# Patient Record
Sex: FEMALE | Race: WHITE | NOT HISPANIC OR LATINO | Employment: FULL TIME | URBAN - METROPOLITAN AREA
[De-identification: names, ages, dates, MRNs, and addresses within clinical notes are randomized per-mention and may not be internally consistent; named-entity substitution may affect disease eponyms.]

---

## 2017-01-04 ENCOUNTER — OFFICE VISIT (OUTPATIENT)
Dept: ORTHOPEDICS | Facility: CLINIC | Age: 57
End: 2017-01-04
Payer: COMMERCIAL

## 2017-01-04 VITALS
WEIGHT: 168 LBS | SYSTOLIC BLOOD PRESSURE: 118 MMHG | BODY MASS INDEX: 25.46 KG/M2 | HEIGHT: 68 IN | DIASTOLIC BLOOD PRESSURE: 78 MMHG

## 2017-01-04 DIAGNOSIS — M54.16 RIGHT LUMBAR RADICULITIS: ICD-10-CM

## 2017-01-04 DIAGNOSIS — M25.551 RIGHT HIP PAIN: Primary | ICD-10-CM

## 2017-01-04 PROCEDURE — 99213 OFFICE O/P EST LOW 20 MIN: CPT | Performed by: FAMILY MEDICINE

## 2017-01-04 RX ORDER — TRIAMCINOLONE ACETONIDE 40 MG/ML
40 INJECTION, SUSPENSION INTRA-ARTICULAR; INTRAMUSCULAR ONCE
Qty: 1 ML | Refills: 0 | Status: CANCELLED | OUTPATIENT
Start: 2017-01-04 | End: 2017-01-04

## 2017-01-04 NOTE — PROGRESS NOTES
Sports Medicine Clinic Visit    PCP: Lala Falcon    Bel Cruz is a 56 year old female who is seen  in consultation at the request of Dr. Falcon presenting with low back pain.  She reports having specific pain over lateral joint line of right hip.  She also ahs noticed pain in lateral hip stabilizers and pain radiating down leg and into toes.  However, it is her lateral joint line pain that is the most problematic.  This pain is exacerbated when she is on her feet when working as a  and when hiking.  This pain is relieved with rest.  She denies injury to her low back or hip.  She recently had X-rays of her pelvis and hip through her chiropractor and was informed that she had mild lateral sided right hip arthritis with mild degeneration of lower lumbar spine.    Injury: None    Location of Pain: Anterior hip pain  Duration of Pain: 6 months  Rating of Pain at worst: 9/10  Rating of Pain Currently:  3/10  Symptoms are better with: OTC pain medication  Symptoms are worse with:Standing, walking, can be unprovoked  Additional Features:   Positive: nerve pain radiating into toes  Other evaluation and/or treatments so far consists of: Chiropractic care, MRI, yoga, traction  Prior History of related problems: Sciatica    Social History: Walks a lot at work    Interim History - January 4, 2017  Since last visit on 12/29/2016 patient has mild right hip pain with minimal radiation.  She presents today for hip injection, although she is having no significant pain.  No interim injury.       Review of Systems  Musculoskeletal: as above  Remainder of review of systems is negative including constitutional, CV, pulmonary, GI, Skin and Neurologic except as noted in HPI or medical history.    Family history, medical history and surgical history have all been discussed with patient and appended to medical chart below.    Past Medical History   Diagnosis Date     Unspecified hypothyroidism      Graves - s/p  "ablation     Esophageal reflux      Panic disorder without agoraphobia 1990     Allergic rhinitis due to other allergen      GENERALIZED ANXIETY DIS 6/27/2007     Past Surgical History   Procedure Laterality Date     Pelvis laparoscopy,dx  1996     Pelvic pain - adhesions and mild endometriosis     Hc hysteroscopy diagostic (separate proc)  8/10/2004     Hysteroscopy, D&C, Endometrial ablation     Colonoscopy  7/26/2011     Procedure:COMBINED COLONOSCOPY, REMOVE TUMOR/POLYP/LESION BY SNARE; single polyp removal; Surgeon:YUE LIMON; Location:[facility]     Us breast clip placement w biopsy left       Colonoscopy with co2 insufflation N/A 6/24/2016     Procedure: COLONOSCOPY WITH CO2 INSUFFLATION;  Surgeon: Joseph Keyes MD;  Location: [facility]     Colonoscopy Left 6/24/2016     Procedure: COMBINED COLONOSCOPY, SINGLE OR MULTIPLE BIOPSY/POLYPECTOMY BY BIOPSY;  Surgeon: Joseph Keyes MD;  Location: [facility]     Family History   Problem Relation Age of Onset     Thyroid Disease Father      Hashimotos     Objective  /78 mmHg  Ht 5' 8\" (1.727 m)  Wt 168 lb (76.204 kg)  BMI 25.55 kg/m2  LMP 07/18/2005  Constitutional:well-developed, well-nourished, and in no distress.   Cardiovascular: Intact distal pulses.    Neurological: alert. Gait normal  Skin: Skin is warm and dry.   Psychiatric: Mood and affect normal.   Respiratory: unlabored, speaks in full sentences  Gastrointestinal: masses neg, deformities neg  Hematologic/Lymphatic/Immunologic: neg lymphadenopathy, neg lymphedema    Exam:  Back Exam:    Inspection:       no visible deformity in the low back       normal skin       normal vascular       normal lymphatic    ROM:       full flexion       full extension       no asymmetric rotation of the pelvis with flexion    Tender/ Tissue Texture Abnormality:  TFL, IT band, gluteal muscles, lateral joint line of hip    Non Tender:       Greater trochanter, anterior or posterior joint line of hip, " paraspinal muscles    Strength:       hip flexion 5/5       knee extension 4/5       ankle dorsiflexion 5/5       ankle plantarflexion 5/5       dorsiflexion of the great toe 5/5      Hip abduction: 4/5      Hip adduction:  4/5    Reflexes:       patellar (L3, L4) symmetric normal       achilles tendons (S1) symmetric normal    Sensation:      grossly intact throughout lower extremities    Special tests:       straight leg raise left neg        straight leg raise right neg       neg (-) GENIA       Negative FABIR       Negative SCOUR       Positive Bobbi         Radiology:  Results for orders placed or performed in visit on 12/27/16   MR Lumbar Spine w/o Contrast    Narrative    MR LUMBAR SPINE W/O CONTRAST 12/27/2016 1:57 PM    History: Radiculopathy, lumbar region    Comparison: None    Technique: Sagittal T1-weighted, sagittal T2-weighted, sagittal STIR,  sagittal diffusion-weighted, axial T2-weighted, and axial gradient  echo images of the lumbar spine were obtained without the  administration of intravenous contrast.    Findings: Regarding numbering convention, there are 5 lumbar-type  vertebrae assumed for the purposes of this dictation.  The tip of the  conus medullaris is at T12. There is subtle grade 1 anterolisthesis of  L4 on L5. Mild disc desiccation at L4-5. Regarding bone marrow signal  intensity, no abnormality is visualized on STIR images.    On a level by level basis:    T12-L1: No spinal canal or neuroforaminal stenosis.    L1-2: No spinal canal or neuroforaminal stenosis.    L2-3: Mild facet arthropathy without significant spinal canal or  neuroforaminal narrowing.    L3-4: Mild facet arthropathy without significant spinal canal or  neural foraminal narrowing.    L4-5: Subtle grade 1 anterolisthesis of L4 on L5. Partially unroofed  intervertebral discs. Moderate bilateral facet arthropathy. Mild  spinal canal narrowing. No significant neural foraminal narrowing.    L5-S1: Small central disc  protrusion. Mild bilateral facet  arthropathy. No significant spinal canal or neural foraminal narrowing    Paraspinous tissues are within normal limits.      Impression    Impression:   1. Mild degenerative changes without significant spinal canal or  neuroforaminal narrowing.  2. Subtle grade 1 anterolisthesis of L4 on L5.    I have personally reviewed the examination and initial interpretation  and I agree with the findings.    ANAND RANDHAWA MD       I have personally reviewed images with patient    Assessment:  1. Right hip pain    2. Right lumbar radiculitis        Plan:  Discussed the assessment with the patient.    Her exam findings continue to be benign in nature.    We discussed postponing her injection today, she agreed  Feeling better clinically  Cannot r/o radicular cause of her pain when it bothers her  Agree she does have a great amount of imbalance of her lateral hip stabilizers, glutes and psoas.    We agree she will begin physical therapy    She is in agreement with this plan.    She is to contact clinic for any questions or concerns.    F/u with Noris in 6 weeks, sooner if needed.  We discussed modified progressive pain-free activity as tolerated  Home handouts provided and supportive care reviewed  All questions were answered today  Contact us with additional questions or concerns  Signs and sx of concern reviewed      Bud Lord DO, DEVONTE  Primary Care Sports Medicine  [facility]         Disclaimer: This note consists of symbols derived from keyboarding, dictation and/or voice recognition software. As a result, there may be errors in the script that have gone undetected. Please consider this when interpreting information found in this chart.

## 2017-01-04 NOTE — NURSING NOTE
"Initial /78 mmHg  Ht 5' 8\" (1.727 m)  Wt 168 lb (76.204 kg)  BMI 25.55 kg/m2  LMP 07/18/2005 Estimated body mass index is 25.55 kg/(m^2) as calculated from the following:    Height as of this encounter: 5' 8\" (1.727 m).    Weight as of this encounter: 168 lb (76.204 kg). .    Frankie Pitts ATC  "

## 2017-01-12 ENCOUNTER — THERAPY VISIT (OUTPATIENT)
Dept: PHYSICAL THERAPY | Facility: CLINIC | Age: 57
End: 2017-01-12
Payer: COMMERCIAL

## 2017-01-12 DIAGNOSIS — M25.551 HIP PAIN, RIGHT: Primary | ICD-10-CM

## 2017-01-12 PROCEDURE — 97110 THERAPEUTIC EXERCISES: CPT | Mod: GP | Performed by: PHYSICAL THERAPIST

## 2017-01-12 PROCEDURE — 97161 PT EVAL LOW COMPLEX 20 MIN: CPT | Mod: GP | Performed by: PHYSICAL THERAPIST

## 2017-01-12 ASSESSMENT — ACTIVITIES OF DAILY LIVING (ADL)
HOS_ADL_ITEM_SCORE_TOTAL: 55
HEAVY_WORK: MODERATE DIFFICULTY
LIGHT_TO_MODERATE_WORK: MODERATE DIFFICULTY
STANDING_FOR_15_MINUTES: NO DIFFICULTY AT ALL
WALKING_DOWN_STEEP_HILLS: NO DIFFICULTY AT ALL
HOS_ADL_COUNT: 17
DEEP_SQUATTING: SLIGHT DIFFICULTY
PUTTING_ON_SOCKS_AND_SHOES: NO DIFFICULTY AT ALL
TWISTING/PIVOTING_ON_INVOLVED_LEG: MODERATE DIFFICULTY
HOS_ADL_SCORE(%): 80.88
SITTING_FOR_15_MINUTES: NO DIFFICULTY AT ALL
WALKING_15_MINUTES_OR_GREATER: SLIGHT DIFFICULTY
WALKING_APPROXIMATELY_10_MINUTES: SLIGHT DIFFICULTY
ROLLING_OVER_IN_BED: SLIGHT DIFFICULTY
WALKING_UP_STEEP_HILLS: NO DIFFICULTY AT ALL
HOS_ADL_HIGHEST_POTENTIAL_SCORE: 68
GETTING_INTO_AND_OUT_OF_A_BATHTUB: NO DIFFICULTY AT ALL
GETTING_INTO_AND_OUT_OF_AN_AVERAGE_CAR: SLIGHT DIFFICULTY
GOING_UP_1_FLIGHT_OF_STAIRS: NO DIFFICULTY AT ALL
RECREATIONAL_ACTIVITIES: SLIGHT DIFFICULTY
STEPPING_UP_AND_DOWN_CURBS: NO DIFFICULTY AT ALL
WALKING_INITIALLY: SLIGHT DIFFICULTY
GOING_DOWN_1_FLIGHT_OF_STAIRS: NO DIFFICULTY AT ALL

## 2017-01-12 NOTE — PROGRESS NOTES
[facility] Initial Evaluation    Subjective:    Bel Cruz is a 56 year old female with a lumbar condition.  Condition occurred with:  Insidious onset.  Condition occurred: for unknown reasons.  This is a recurrent condition  Original pain started in R hip with ER motion in standing.  Started with stepping out of bed in AM.  Over time pain progressed to pain in R thigh and down into R lateral foot.  Hx of sciatic symptoms in R leg ~10 yrs ago.  Pain worse with laying on R side, squatting, prolonged time on feet.  Will wake 2-3x/night.  Trouble getting up from squat position. Feels better with light stretching and massage.  MD order 17 .    Site of Pain: R ant hip.  Radiates to:  Thigh right, foot right and lower leg right.  Pain is described as aching and is intermittent and reported as 7/10.  Associated symptoms:  Loss of motion, loss of motion/stiffness, loss of strength, tingling and numbness. Pain is worse in the P.M..  Symptoms are exacerbated by twisting, lying down, standing, walking, lifting and sitting and relieved by rest.  Since onset symptoms are unchanged.    Previous treatment includes chiropractic (massage).  There was moderate improvement following previous treatment.  General health as reported by patient is excellent.  Pertinent medical history includes:  Thyroid problems and menopausal.  Medical allergies: no.  Other surgeries include:  No.  Current medications:  Thyroid medication and anti-inflammatory.  Current occupation is Teacher, .  Patient is working in normal job without restrictions.  Primary job tasks include:  Prolonged sitting, repetitive tasks, prolonged standing and lifting.    Barriers include:  None as reported by the patient.    Red flags:  None as reported by the patient.                      Objective:    System    Physical Exam    General     ROS  Bel Cruz , : 1960, MRN: [id number]    Physical Therapy Objective  Findings  Subjective information, goals, clinical impression, daily documentation and other information found in EPISODES tab.  Objective:     Lumbar Pain    Posture: sitting: mild slouch, posture correction: no change  Gait:  normal  Lumbar Range of Motion:  Flexion                                                   100%                                                                                                                        Extension 66%   Right Side Bending 75%   Left Side Bending 75%   Repeated extension- standing No change with 30 reps   Repeated flexion- standing No change with 30 reps     Pelvic screen:                                                                         Positive                                            Negative                                             Standing Forward Bend X R    Gillet(March) X R    Supine to sit     Sacroiliac provocation test     Pubic symphysis provocation            -resisted hip add at 44     Other:       Hip Screen:                                                                  Positive                                             Negative                                             Hip ROM     Scour X R    GENIA X R    FADIR  x   Other: repeated hip extension X - improved ROM and muscle strength of hip rotators    Manual Muscle Testing (graded 0-5, measured at 0 degrees unless otherwise noted):                                                                              Right                                  Left                                                     Transversus Abdominus     -Charles Leg Lowering (deg)     Hip Flex L2 4- 5   Hip Abd 3 3   Hip Add 5 5   Hip Ext 3+ 4   Knee Flex 5 5   Knee Ext L3 5 5   Ankle Dorsiflexion L4 5 5   Great Toe Extension L5 5 5   Ankle Plantar Flexion S1 5 5   Other: hip ER  Hip IR 3+  4 5  5   (+ mild pain, ++ moderate pain, +++ severe pain)    Special Tests:                                                                      Positive                                             Negative                                             Sign of Buttock  x   SLR  x   Garo Test X R    Ely Test  x   Prone instability Test  x   Crossover SLR     Repeated extension prone     Other:       Flexibility:                                                              Right                                                 Left                                                      Hamstring SLR 90    Hip flexor Mild tightness normal   Quadricep Mild rightness normal   Bobbi's normal normal   piriformis normal normal   Other:            Segmental Mobility: hypomobile L4-L5    Palpation: hypertonicity R hip flexor/tensor    -If symptoms past hip, must do neuro testing  Dermatome/Sensory  Testing: normal to light touch BLE  Reflex Testing:                                                                 Right                                                  Left                                                     Patellar Tendon normal normal   Achilles Tendon normal normal   Babinski         Assessment/Plan:      Patient is a 56 year old female with lumbar and right side hip complaints.    Patient has the following significant findings with corresponding treatment plan.                Diagnosis 1:  R hip/leg pain consistent with labral tear R hip with compensation of low back    Pain -  hot/cold therapy, manual therapy, self management, education, directional preference exercise and home program  Decreased ROM/flexibility - manual therapy, therapeutic exercise and home program  Decreased joint mobility - manual therapy, therapeutic exercise and home program  Decreased strength - therapeutic exercise, therapeutic activities and home program    Therapy Evaluation Codes:   1) History comprised of:   Personal factors that impact the plan of care:      Age, Past/current experiences and Profession.    Comorbidity factors that impact the  plan of care are:      None.     Medications impacting care: Anti-inflammatory.  2) Examination of Body Systems comprised of:   Body structures and functions that impact the plan of care:      Hip.   Activity limitations that impact the plan of care are:      Lifting, Sports, Squatting/kneeling, Stairs, Standing, Walking and Laying down.  3) Clinical presentation characteristics are:   Stable/Uncomplicated.  4) Decision-Making    Low complexity using standardized patient assessment instrument and/or measureable assessment of functional outcome.  Cumulative Therapy Evaluation is: Low complexity.    Previous and current functional limitations:  (See Goal Flow Sheet for this information)    Short term and Long term goals: (See Goal Flow Sheet for this information)     Communication ability:  Patient appears to be able to clearly communicate and understand verbal and written communication and follow directions correctly.  Treatment Explanation - The following has been discussed with the patient:   RX ordered/plan of care  Anticipated outcomes  Possible risks and side effects  This patient would benefit from PT intervention to resume normal activities.   Rehab potential is good.    Frequency:  1 X week, once daily  Duration:  for 6 weeks  Discharge Plan:  Achieve all LTG.  Independent in home treatment program.  Reach maximal therapeutic benefit.    Please refer to the daily flowsheet for treatment today, total treatment time and time spent performing 1:1 timed codes.         Frankie Salvador,PT, DPT, OCS

## 2017-01-17 ENCOUNTER — OFFICE VISIT (OUTPATIENT)
Dept: FAMILY MEDICINE | Facility: OTHER | Age: 57
End: 2017-01-17
Payer: COMMERCIAL

## 2017-01-17 ENCOUNTER — RADIANT APPOINTMENT (OUTPATIENT)
Dept: MAMMOGRAPHY | Facility: OTHER | Age: 57
End: 2017-01-17
Payer: COMMERCIAL

## 2017-01-17 VITALS
BODY MASS INDEX: 25.73 KG/M2 | DIASTOLIC BLOOD PRESSURE: 80 MMHG | HEIGHT: 68 IN | WEIGHT: 169.8 LBS | RESPIRATION RATE: 16 BRPM | HEART RATE: 60 BPM | TEMPERATURE: 97.8 F | SYSTOLIC BLOOD PRESSURE: 120 MMHG

## 2017-01-17 DIAGNOSIS — Z12.31 VISIT FOR SCREENING MAMMOGRAM: ICD-10-CM

## 2017-01-17 DIAGNOSIS — Z00.00 ENCOUNTER FOR ROUTINE ADULT HEALTH EXAMINATION WITHOUT ABNORMAL FINDINGS: Primary | ICD-10-CM

## 2017-01-17 DIAGNOSIS — E03.9 HYPOTHYROIDISM, UNSPECIFIED TYPE: ICD-10-CM

## 2017-01-17 DIAGNOSIS — Z23 NEED FOR TD VACCINE: ICD-10-CM

## 2017-01-17 PROCEDURE — 84443 ASSAY THYROID STIM HORMONE: CPT | Performed by: FAMILY MEDICINE

## 2017-01-17 PROCEDURE — 90471 IMMUNIZATION ADMIN: CPT | Performed by: FAMILY MEDICINE

## 2017-01-17 PROCEDURE — 80061 LIPID PANEL: CPT | Performed by: FAMILY MEDICINE

## 2017-01-17 PROCEDURE — 99396 PREV VISIT EST AGE 40-64: CPT | Mod: 25 | Performed by: FAMILY MEDICINE

## 2017-01-17 PROCEDURE — 90714 TD VACC NO PRESV 7 YRS+ IM: CPT | Performed by: FAMILY MEDICINE

## 2017-01-17 PROCEDURE — 36415 COLL VENOUS BLD VENIPUNCTURE: CPT | Performed by: FAMILY MEDICINE

## 2017-01-17 PROCEDURE — G0202 SCR MAMMO BI INCL CAD: HCPCS | Mod: TC

## 2017-01-17 ASSESSMENT — PAIN SCALES - GENERAL: PAINLEVEL: MILD PAIN (2)

## 2017-01-17 NOTE — MR AVS SNAPSHOT
After Visit Summary   1/17/2017    Bel Cruz    MRN: [id number]           Patient Information     Date Of Birth          1960        Visit Information        Provider Department      1/17/2017 3:00 PM Lala Falcon MD [facility]        Today's Diagnoses     Encounter for routine adult health examination without abnormal findings    -  1     Need for TD vaccine         Hypothyroidism, unspecified type            Follow-ups after your visit        Your next 10 appointments already scheduled     Jan 19, 2017  2:30 PM   JESSE Spine with Frankie Salvador PT   [facility] ([place], [place]) Physical Therapy ([facility]  )    [address]  [facility] [id number] [phone]            Jan 26, 2017  2:30 PM   JESSE Spine with Frankie Salvador PT   [facility] ([place], [place] Physical Therapy ([facility]  )    [address]  [facility] [id number] [phone]              Who to contact     If you have questions or need follow up information about today's clinic visit or your schedule please contact [facility] directly at [phone].  Normal or non-critical lab and imaging results will be communicated to you by MyChart, letter or phone within 4 business days after the clinic has received the results. If you do not hear from us within 7 days, please contact the clinic through [facility] or phone. If you have a critical or abnormal lab result, we will notify you by phone as soon as possible.  Submit refill requests through [employer] or call your pharmacy and they will forward the refill request to us. Please allow 3 business days for your refill to be completed.          Additional Information About Your Visit        [facility] Information     [employer] gives you secure access to your electronic health record. If you see a primary care provider, you can also send messages to your care team and make  "appointments. If you have questions, please call your primary care clinic.  If you do not have a primary care provider, please call [phone] and they will assist you.        Care EveryWhere ID     This is your Care EveryWhere ID. This could be used by other organizations to access your [facility] medical records  [id number]        Your Vitals Were     Pulse Temperature Respirations Height BMI (Body Mass Index) Last Period    60 97.8  F (36.6  C) (Temporal) 16 5' 8\" (1.727 m) 25.82 kg/m2 07/18/2005       Blood Pressure from Last 3 Encounters:   01/17/17 120/80   01/04/17 118/78   12/02/16 146/86    Weight from Last 3 Encounters:   01/17/17 169 lb 12.8 oz (77.021 kg)   01/04/17 168 lb (76.204 kg)   12/29/16 168 lb (76.204 kg)              We Performed the Following     ADMIN 1st VACCINE     Lipid panel reflex to direct LDL     TD PRESERV FREE >=7 YRS ADS IM     TSH with free T4 reflex          Today's Medication Changes          These changes are accurate as of: 1/17/17  3:37 PM.  If you have any questions, ask your nurse or doctor.               Stop taking these medicines if you haven't already. Please contact your care team if you have questions.     estradiol 2 MG vaginal ring   Commonly known as:  ESTRING   Stopped by:  Lala Falcon MD                    Primary Care Provider Office Phone # Fax #    Lala Falcon [phone]       [facility] [address]  [place] [address]        Thank you!     Thank you for choosing [facility]  for your care. Our goal is always to provide you with excellent care. Hearing back from our patients is one way we can continue to improve our services. Please take a few minutes to complete the written survey that you may receive in the mail after your visit with us. Thank you!             Your Updated Medication List - Protect others around you: Learn how to safely use, store and throw away your medicines at " www.disposemymeds.org.          This list is accurate as of: 1/17/17  3:37 PM.  Always use your most recent med list.                   Brand Name Dispense Instructions for use    IBUPROFEN PO      Take 600 mg by mouth every 6 hours as needed for moderate pain       levothyroxine 112 MCG tablet    SYNTHROID/LEVOTHROID    90 tablet    Take 1 tablet (112 mcg) by mouth daily Needs appointment for further refills.       OMEGA-3 FISH OIL PO      Take by mouth daily       WOMENS MULTIVITAMIN PLUS PO      Take 1 tablet by mouth daily. Over 50

## 2017-01-17 NOTE — PROGRESS NOTES
SUBJECTIVE:     CC: Bel Cruz is an 56 year old woman who presents for preventive health visit.     Physical  Annual:     Getting at least 3 servings of Calcium per day::  Yes    Bi-annual eye exam::  Yes    Dental care twice a year::  Yes    Sleep apnea or symptoms of sleep apnea::  None    Diet::  Regular (no restrictions)    Frequency of exercise::  2-3 days/week    Duration of exercise::  30-45 minutes    Taking medications regularly::  Yes    Medication side effects::  None    Additional concerns today::  YES    Today's PHQ-2 Score:   PHQ-2 ( 1999 Pf[employer]) 1/17/2017   Q1: Little interest or pleasure in doing things 0   Q2: Feeling down, depressed or hopeless 0   PHQ-2 Score 0   Little interest or pleasure in doing things Not at all   Feeling down, depressed or hopeless Not at all   PHQ-2 Score 0       Abuse: Current or Past(Physical, Sexual or Emotional)- No  Do you feel safe in your environment - Yes    Social History   Substance Use Topics     Smoking status: Never Smoker      Smokeless tobacco: Never Used      Comment: no smokers in household     Alcohol Use: No     The patient does not drink >3 drinks per day nor >7 drinks per week.    Recent Labs   Lab Test  08/28/13   0943  09/02/09   1330   CHOL  186  189   HDL  57  59   LDL  98  111   TRIG  155*  98   CHOLHDLRATIO  3.0  3.0       Reviewed orders with patient.  Reviewed health maintenance and updated orders accordingly - Yes    Mammo Decision Support:  Patient over age 50, mutual decision to screen reflected in health maintenance.    Pertinent mammograms are reviewed under the imaging tab.  History of abnormal Pap smear: NO - age 30-65 PAP every 5 years with negative HPV co-testing recommended  All Histories reviewed and updated in Epic.      ROS:  C: NEGATIVE for fever, chills, change in weight  I: NEGATIVE for worrisome rashes, moles or lesions  E: NEGATIVE for vision changes or irritation  ENT: NEGATIVE for ear, mouth and throat problems  R:  "NEGATIVE for significant cough or SOB  B: NEGATIVE for masses, tenderness or discharge  CV: NEGATIVE for chest pain, palpitations or peripheral edema  GI: NEGATIVE for nausea, abdominal pain, heartburn, or change in bowel habits  : NEGATIVE for unusual urinary or vaginal symptoms. No vaginal bleeding.  M: right hip pain continues, working with Physical Therapy at this time  N: NEGATIVE for weakness, dizziness or paresthesias  P: NEGATIVE for changes in mood or affect     OBJECTIVE:     /80 mmHg  Pulse 60  Temp(Src) 97.8  F (36.6  C) (Temporal)  Resp 16  Ht 5' 8\" (1.727 m)  Wt 169 lb 12.8 oz (77.021 kg)  BMI 25.82 kg/m2  LMP 07/18/2005  EXAM:  GENERAL: healthy, alert and no distress  EYES: Eyes grossly normal to inspection, PERRL and conjunctivae and sclerae normal  HENT: ear canals and TM's normal, nose and mouth without ulcers or lesions  NECK: no adenopathy, no asymmetry, masses, or scars and thyroid normal to palpation  RESP: lungs clear to auscultation - no rales, rhonchi or wheezes  BREAST: normal without masses, tenderness or nipple discharge and no palpable axillary masses or adenopathy  CV: regular rate and rhythm, normal S1 S2, no murmur, no peripheral edema  ABDOMEN: soft, nontender, no hepatosplenomegaly, no masses and bowel sounds normal  MS: no gross musculoskeletal defects noted, no edema  SKIN: no suspicious lesions or rashes  NEURO: Normal strength and tone, mentation intact and speech normal  PSYCH: mentation appears normal, affect normal/bright    ASSESSMENT/PLAN:     1. Encounter for routine adult health examination without abnormal findings    - Lipid panel reflex to direct LDL    2. Need for TD vaccine    - TD PRESERV FREE >=7 YRS ADS IM  - ADMIN 1st VACCINE    3. Hypothyroidism, unspecified type    - TSH with free T4 reflex    COUNSELING:  Reviewed preventive health counseling, as reflected in patient instructions    BP Screening:   Last 3 BP Readings:    BP Readings from Last 3 " "Encounters:   01/17/17 120/80   01/04/17 118/78   12/02/16 146/86       The following was recommended to the patient:  Re-screen BP within a year and recommended lifestyle modifications       reports that she has never smoked. She has never used smokeless tobacco.    Estimated body mass index is 25.82 kg/(m^2) as calculated from the following:    Height as of this encounter: 5' 8\" (1.727 m).    Weight as of this encounter: 169 lb 12.8 oz (77.021 kg).   Weight management plan: Discussed healthy diet and exercise guidelines and patient will follow up in 12 months in clinic to re-evaluate.    Counseling Resources:  ATP IV Guidelines  Pooled Cohorts Equation Calculator  Breast Cancer Risk Calculator  FRAX Risk Assessment  ICSI Preventive Guidelines  Dietary Guidelines for Americans, 2010  USDA's MyPlate  ASA Prophylaxis  Lung CA Screening    Lala Falcon MD  [facility]    "

## 2017-01-17 NOTE — NURSING NOTE
"Chief Complaint   Patient presents with     Physical       Initial /80 mmHg  Pulse 60  Temp(Src) 97.8  F (36.6  C) (Temporal)  Resp 16  Ht 5' 8\" (1.727 m)  Wt 169 lb 12.8 oz (77.021 kg)  BMI 25.82 kg/m2  LMP 07/18/2005 Estimated body mass index is 25.82 kg/(m^2) as calculated from the following:    Height as of this encounter: 5' 8\" (1.727 m).    Weight as of this encounter: 169 lb 12.8 oz (77.021 kg).  BP completed using cuff size: milagros West CMA    "

## 2017-01-17 NOTE — NURSING NOTE
Screening Questionnaire for Adult Immunization    Are you sick today?   No   Do you have allergies to medications, food, a vaccine component or latex?   No   Have you ever had a serious reaction after receiving a vaccination?   No   Do you have a long-term health problem with heart disease, lung disease, asthma, kidney disease, metabolic disease (e.g. diabetes), anemia, or other blood disorder?   No   Do you have cancer, leukemia, HIV/AIDS, or any other immune system problem?   No   In the past 3 months, have you taken medications that affect  your immune system, such as prednisone, other steroids, or anticancer drugs; drugs for the treatment of rheumatoid arthritis, Crohn s disease, or psoriasis; or have you had radiation treatments?   No   Have you had a seizure, or a brain or other nervous system problem?   No   During the past year, have you received a transfusion of blood or blood     products, or been given immune (gamma) globulin or antiviral drug?   No   For women: Are you pregnant or is there a chance you could become        pregnant during the next month?   No   Have you received any vaccinations in the past 4 weeks?   No     Immunization questionnaire answers were all negative.      MNVFC doesn't apply on this patient    Patient instructed to remain in clinic for 20 minutes afterwards, and to report any adverse reaction to me immediately.       Screening performed by Evonne West on 1/17/2017 at 3:20 PM.

## 2017-01-18 LAB
CHOLEST SERPL-MCNC: 221 MG/DL
HDLC SERPL-MCNC: 67 MG/DL
LDLC SERPL CALC-MCNC: 120 MG/DL
NONHDLC SERPL-MCNC: 154 MG/DL
TRIGL SERPL-MCNC: 170 MG/DL
TSH SERPL DL<=0.005 MIU/L-ACNC: 1.65 MU/L (ref 0.4–4)

## 2017-01-18 RX ORDER — LEVOTHYROXINE SODIUM 112 UG/1
112 TABLET ORAL DAILY
Qty: 90 TABLET | Refills: 3 | Status: SHIPPED | OUTPATIENT
Start: 2017-01-18 | End: 2018-02-12

## 2017-01-19 ENCOUNTER — THERAPY VISIT (OUTPATIENT)
Dept: PHYSICAL THERAPY | Facility: CLINIC | Age: 57
End: 2017-01-19
Payer: COMMERCIAL

## 2017-01-19 DIAGNOSIS — M25.551 HIP PAIN, RIGHT: Primary | ICD-10-CM

## 2017-01-19 PROCEDURE — 97110 THERAPEUTIC EXERCISES: CPT | Mod: GP | Performed by: PHYSICAL THERAPIST

## 2017-01-19 PROCEDURE — 97140 MANUAL THERAPY 1/> REGIONS: CPT | Mod: GP | Performed by: PHYSICAL THERAPIST

## 2017-01-19 PROCEDURE — 97112 NEUROMUSCULAR REEDUCATION: CPT | Mod: GP | Performed by: PHYSICAL THERAPIST

## 2017-01-26 ENCOUNTER — THERAPY VISIT (OUTPATIENT)
Dept: PHYSICAL THERAPY | Facility: CLINIC | Age: 57
End: 2017-01-26
Payer: COMMERCIAL

## 2017-01-26 DIAGNOSIS — M25.551 HIP PAIN, RIGHT: Primary | ICD-10-CM

## 2017-01-26 PROCEDURE — 97112 NEUROMUSCULAR REEDUCATION: CPT | Mod: GP | Performed by: PHYSICAL THERAPIST

## 2017-01-26 PROCEDURE — 97140 MANUAL THERAPY 1/> REGIONS: CPT | Mod: GP | Performed by: PHYSICAL THERAPIST

## 2017-01-26 PROCEDURE — 97110 THERAPEUTIC EXERCISES: CPT | Mod: GP | Performed by: PHYSICAL THERAPIST

## 2017-02-03 ENCOUNTER — THERAPY VISIT (OUTPATIENT)
Dept: PHYSICAL THERAPY | Facility: CLINIC | Age: 57
End: 2017-02-03
Payer: COMMERCIAL

## 2017-02-03 DIAGNOSIS — M25.551 HIP PAIN, RIGHT: Primary | ICD-10-CM

## 2017-02-03 PROCEDURE — 97112 NEUROMUSCULAR REEDUCATION: CPT | Mod: GP | Performed by: PHYSICAL THERAPIST

## 2017-02-03 PROCEDURE — 97110 THERAPEUTIC EXERCISES: CPT | Mod: GP | Performed by: PHYSICAL THERAPIST

## 2017-02-23 ENCOUNTER — THERAPY VISIT (OUTPATIENT)
Dept: PHYSICAL THERAPY | Facility: CLINIC | Age: 57
End: 2017-02-23
Payer: COMMERCIAL

## 2017-02-23 DIAGNOSIS — M25.551 HIP PAIN, RIGHT: ICD-10-CM

## 2017-02-23 PROCEDURE — 97110 THERAPEUTIC EXERCISES: CPT | Mod: GP | Performed by: PHYSICAL THERAPIST

## 2017-02-23 PROCEDURE — 97112 NEUROMUSCULAR REEDUCATION: CPT | Mod: GP | Performed by: PHYSICAL THERAPIST

## 2017-03-09 ENCOUNTER — THERAPY VISIT (OUTPATIENT)
Dept: PHYSICAL THERAPY | Facility: CLINIC | Age: 57
End: 2017-03-09
Payer: COMMERCIAL

## 2017-03-09 DIAGNOSIS — M25.551 HIP PAIN, RIGHT: ICD-10-CM

## 2017-03-09 PROCEDURE — 97140 MANUAL THERAPY 1/> REGIONS: CPT | Mod: GP | Performed by: PHYSICAL THERAPIST

## 2017-03-09 PROCEDURE — 97112 NEUROMUSCULAR REEDUCATION: CPT | Mod: GP | Performed by: PHYSICAL THERAPIST

## 2017-03-09 PROCEDURE — 97110 THERAPEUTIC EXERCISES: CPT | Mod: GP | Performed by: PHYSICAL THERAPIST

## 2017-03-09 ASSESSMENT — ACTIVITIES OF DAILY LIVING (ADL)
SITTING_FOR_15_MINUTES: SLIGHT DIFFICULTY
WALKING_APPROXIMATELY_10_MINUTES: NO DIFFICULTY AT ALL
PUTTING_ON_SOCKS_AND_SHOES: NO DIFFICULTY AT ALL
HOS_ADL_HIGHEST_POTENTIAL_SCORE: 68
GETTING_INTO_AND_OUT_OF_A_BATHTUB: NO DIFFICULTY AT ALL
WALKING_UP_STEEP_HILLS: NO DIFFICULTY AT ALL
GOING_DOWN_1_FLIGHT_OF_STAIRS: NO DIFFICULTY AT ALL
HOS_ADL_SCORE(%): 94.12
TWISTING/PIVOTING_ON_INVOLVED_LEG: SLIGHT DIFFICULTY
WALKING_15_MINUTES_OR_GREATER: NO DIFFICULTY AT ALL
STANDING_FOR_15_MINUTES: NO DIFFICULTY AT ALL
LIGHT_TO_MODERATE_WORK: NO DIFFICULTY AT ALL
STEPPING_UP_AND_DOWN_CURBS: NO DIFFICULTY AT ALL
WALKING_DOWN_STEEP_HILLS: NO DIFFICULTY AT ALL
WALKING_INITIALLY: SLIGHT DIFFICULTY
HOS_ADL_COUNT: 17
DEEP_SQUATTING: NO DIFFICULTY AT ALL
GOING_UP_1_FLIGHT_OF_STAIRS: NO DIFFICULTY AT ALL
HOS_ADL_ITEM_SCORE_TOTAL: 64
RECREATIONAL_ACTIVITIES: SLIGHT DIFFICULTY
ROLLING_OVER_IN_BED: NO DIFFICULTY AT ALL
HEAVY_WORK: SLIGHT DIFFICULTY
GETTING_INTO_AND_OUT_OF_AN_AVERAGE_CAR: NO DIFFICULTY AT ALL

## 2017-03-09 NOTE — MR AVS SNAPSHOT
After Visit Summary   3/9/2017    Bel Cruz    MRN: [id number]           Patient Information     Date Of Birth          1960        Visit Information        Provider Department      3/9/2017 3:50 PM Frankie Salvador, PT [facility] Physical Therapy        Today's Diagnoses     Hip pain, right           Follow-ups after your visit        Your next 10 appointments already scheduled     Mar 16, 2017  3:50 PM CDT   JESSE Spine with Frankie Salvador PT   [facility] Physical Therapy ([facility] [place]  )    [address]  [facility] [address] [phone]              Who to contact     If you have questions or need follow up information about today's clinic visit or your schedule please contact [facility] PHYSICAL THERAPY directly at [phone].  Normal or non-critical lab and imaging results will be communicated to you by MyChart, letter or phone within 4 business days after the clinic has received the results. If you do not hear from us within 7 days, please contact the clinic through [employer] or phone. If you have a critical or abnormal lab result, we will notify you by phone as soon as possible.  Submit refill requests through [employer] or call your pharmacy and they will forward the refill request to us. Please allow 3 business days for your refill to be completed.          Additional Information About Your Visit        MyChart Information     [employer] gives you secure access to your electronic health record. If you see a primary care provider, you can also send messages to your care team and make appointments. If you have questions, please call your primary care clinic.  If you do not have a primary care provider, please call [phone] and they will assist you.        Care EveryWhere ID     This is your Care EveryWhere ID. This could be used by other organizations to access your  [place] medical records  [id number]        Your Vitals Were     Last Period                   07/18/2005            Blood Pressure from Last 3 Encounters:   01/17/17 120/80   01/04/17 118/78   12/02/16 146/86    Weight from Last 3 Encounters:   01/17/17 77 kg (169 lb 12.8 oz)   01/04/17 76.2 kg (168 lb)   12/29/16 76.2 kg (168 lb)              We Performed the Following     JESSE PROGRESS NOTES REPORT     MANUAL THER TECH,1+REGIONS,EA 15 MIN     NEUROMUSCULAR RE-EDUCATION     THERAPEUTIC EXERCISES        Primary Care Provider Office Phone # Fax #    Lala CHRISTOPHER Falcon [phone]       [facility] [address] [facility] [address]  [facility] [address]        Thank you!     Thank you for choosing [place] FOR [facility]TH[facility]ICINE [facility] PHYSICAL THERAPY  for your care. Our goal is always to provide you with excellent care. Hearing back from our patients is one way we can continue to improve our services. Please take a few minutes to complete the written survey that you may receive in the mail after your visit with us. Thank you!             Your Updated Medication List - Protect others around you: Learn how to safely use, store and throw away your medicines at www.disposemymeds.org.          This list is accurate as of: 3/9/17  4:46 PM.  Always use your most recent med list.                   Brand Name Dispense Instructions for use    IBUPROFEN PO      Take 600 mg by mouth every 6 hours as needed for moderate pain       levothyroxine 112 MCG tablet    SYNTHROID/LEVOTHROID    90 tablet    Take 1 tablet (112 mcg) by mouth daily       OMEGA-3 FISH OIL PO      Take by mouth daily       WOMENS MULTIVITAMIN PLUS PO      Take 1 tablet by mouth daily. Over 50

## 2017-03-09 NOTE — LETTER
[facility] PHYSICAL [facility]  [address]  [facility] [id number] [phone]    March 10, 2017    Re: Bel Cruz   :   1960  MRN:  [id number]   REFERRING PHYSICIAN:   Bud Lord    [facility]  Date of Initial Evaluation:  ***  Visits:  Rxs Used: 6  Reason for Referral:  Hip pain, right    EVALUATION SUMMARY    Subjective:    HPI                    Objective:    System    Physical Exam    General     ROS    Assessment/Plan:      PROGRESS  REPORT    Progress reporting period is from 17 to 3/9/17.       SUBJECTIVE  Subjective changes noted by patient:  was sore in muscles of R hip for 2 days, feels much stronger now, felt like exercises have gotten so muhc easier, still pain on ant hip, but pain isn't inside the hip,    Current Pain level: 0-2/10.     Previous pain level was  NA Initial Pain level: 7/10.   Changes in function:  Yes (See Goal flowsheet attached for changes in current functional level)  Adverse reaction to treatment or activity: None    OBJECTIVE  Changes noted in objective findings:    Objective: full lumbar ROM, - GENIA, - FADIR, - scour, single limb bridge R 20/20 difficulty 3/5, L 20/20 difficulty 3/5, double leg lowering 85%, MMT hip abd 4/5, hip ER 4+/5, hypertonicity R tensor     ASSESSMENT/PLAN  Updated problem list and treatment plan: Diagnosis 1:  R hip/leg pain consistent with labral tear R hip with compensation of low back  Pain -  hot/cold therapy, manual therapy, self management and home program  Decreased ROM/flexibility - manual therapy, therapeutic exercise and home program  Decreased strength - therapeutic exercise, therapeutic activities and home program  STG/LTGs have been met or progress has been made towards goals:  Yes (See Goal flow sheet completed today.)  Assessment of Progress: The patient's condition is improving.  Self Management Plans:  Patient has  been instructed in a home treatment program.  I have re-evaluated this patient and find that the nature, scope, duration and intensity of the therapy is appropriate for the medical condition of the patient.  Bel continues to require the following intervention to meet STG and LTG's:  PT    Recommendations:  This patient would benefit from continued therapy.     Frequency:  1 X week, once daily  Duration:  for 3 weeks      Frankie Salvador,PT, DPT, OCS            Thank you for your referral.    INQUIRIES  Therapist:   [facility]  [address] [place] [address] [address]  [facility] [address]  Phone: [phone]  Fax: [phone]

## 2017-03-09 NOTE — PROGRESS NOTES
Subjective:    HPI                    Objective:    System    Physical Exam    General     ROS    Assessment/Plan:      PROGRESS  REPORT    Progress reporting period is from 1/12/17 to 3/9/17.       SUBJECTIVE  Subjective changes noted by patient:  was sore in muscles of R hip for 2 days, feels much stronger now, felt like exercises have gotten so muhc easier, still pain on ant hip, but pain isn't inside the hip,    Current Pain level: 0-2/10.     Previous pain level was  NA Initial Pain level: 7/10.   Changes in function:  Yes (See Goal flowsheet attached for changes in current functional level)  Adverse reaction to treatment or activity: None    OBJECTIVE  Changes noted in objective findings:    Objective: full lumbar ROM, - GENIA, - FADIR, - scour, single limb bridge R 20/20 difficulty 3/5, L 20/20 difficulty 3/5, double leg lowering 85%, MMT hip abd 4/5, hip ER 4+/5, hypertonicity R tensor     ASSESSMENT/PLAN  Updated problem list and treatment plan: Diagnosis 1:  R hip/leg pain consistent with labral tear R hip with compensation of low back  Pain -  hot/cold therapy, manual therapy, self management and home program  Decreased ROM/flexibility - manual therapy, therapeutic exercise and home program  Decreased strength - therapeutic exercise, therapeutic activities and home program  STG/LTGs have been met or progress has been made towards goals:  Yes (See Goal flow sheet completed today.)  Assessment of Progress: The patient's condition is improving.  Self Management Plans:  Patient has been instructed in a home treatment program.  I have re-evaluated this patient and find that the nature, scope, duration and intensity of the therapy is appropriate for the medical condition of the patient.  Bel continues to require the following intervention to meet STG and LTG's:  PT    Recommendations:  This patient would benefit from continued therapy.     Frequency:  1 X week, once daily  Duration:  for 3  weeks        Please refer to the daily flowsheet for treatment today, total treatment time and time spent performing 1:1 timed codes.        Frankie Salvador,PT, DPT, OCS

## 2017-04-03 ENCOUNTER — THERAPY VISIT (OUTPATIENT)
Dept: PHYSICAL THERAPY | Facility: CLINIC | Age: 57
End: 2017-04-03
Payer: COMMERCIAL

## 2017-04-03 DIAGNOSIS — M25.551 HIP PAIN, RIGHT: ICD-10-CM

## 2017-04-03 PROCEDURE — 97530 THERAPEUTIC ACTIVITIES: CPT | Mod: GP | Performed by: PHYSICAL THERAPIST

## 2017-04-03 PROCEDURE — 97112 NEUROMUSCULAR REEDUCATION: CPT | Mod: GP | Performed by: PHYSICAL THERAPIST

## 2017-04-03 NOTE — MR AVS SNAPSHOT
After Visit Summary   4/3/2017    Bel Cruz    MRN: [id number]           Patient Information     Date Of Birth          1960        Visit Information        Provider Department      4/3/2017 2:50 PM Frankie Salvador, PT [facility] Physical Therapy        Today's Diagnoses     Hip pain, right           Follow-ups after your visit        Your next 10 appointments already scheduled     Apr 27, 2017  3:10 PM CDT   JESSE Spine with Frankie Salvador PT   [facility] Physical Therapy ([facility] [place]  )    [address]  [facility] [address] [phone]              Who to contact     If you have questions or need follow up information about today's clinic visit or your schedule please contact [facility] PHYSICAL THERAPY directly at [phone].  Normal or non-critical lab and imaging results will be communicated to you by MyChart, letter or phone within 4 business days after the clinic has received the results. If you do not hear from us within 7 days, please contact the clinic through [employer] or phone. If you have a critical or abnormal lab result, we will notify you by phone as soon as possible.  Submit refill requests through [employer] or call your pharmacy and they will forward the refill request to us. Please allow 3 business days for your refill to be completed.          Additional Information About Your Visit        MyChart Information     [employer] gives you secure access to your electronic health record. If you see a primary care provider, you can also send messages to your care team and make appointments. If you have questions, please call your primary care clinic.  If you do not have a primary care provider, please call [phone] and they will assist you.        Care EveryWhere ID     This is your Care EveryWhere ID. This could be used by other organizations to access your  [place] medical records  [id number]        Your Vitals Were     Last Period                   07/18/2005            Blood Pressure from Last 3 Encounters:   01/17/17 120/80   01/04/17 118/78   12/02/16 146/86    Weight from Last 3 Encounters:   01/17/17 77 kg (169 lb 12.8 oz)   01/04/17 76.2 kg (168 lb)   12/29/16 76.2 kg (168 lb)              Today, you had the following     No orders found for display       Primary Care Provider Office Phone # Fax #    Lala CHRISTOPHER Falcon [phone]       [facility] [address] [facility] [address]  [facility] [address]        Thank you!     Thank you for choosing [place] FOR ATHLETIC MEDICINE [facility] PHYSICAL THERAPY  for your care. Our goal is always to provide you with excellent care. Hearing back from our patients is one way we can continue to improve our services. Please take a few minutes to complete the written survey that you may receive in the mail after your visit with us. Thank you!             Your Updated Medication List - Protect others around you: Learn how to safely use, store and throw away your medicines at www.disposemymeds.org.          This list is accurate as of: 4/3/17  3:34 PM.  Always use your most recent med list.                   Brand Name Dispense Instructions for use    IBUPROFEN PO      Take 600 mg by mouth every 6 hours as needed for moderate pain       levothyroxine 112 MCG tablet    SYNTHROID/LEVOTHROID    90 tablet    Take 1 tablet (112 mcg) by mouth daily       OMEGA-3 FISH OIL PO      Take by mouth daily       WOMENS MULTIVITAMIN PLUS PO      Take 1 tablet by mouth daily. Over 50

## 2017-04-14 ENCOUNTER — TELEPHONE (OUTPATIENT)
Dept: FAMILY MEDICINE | Facility: OTHER | Age: 57
End: 2017-04-14

## 2017-04-14 NOTE — TELEPHONE ENCOUNTER
Ok for  Only next Friday. If symptoms are new or worse, please transfer to triage.     Trista Aly RN, BSN

## 2017-04-14 NOTE — TELEPHONE ENCOUNTER
Spoke with patient got scheduled for the available Dr only next Friday. Clementina Toscano CMA ([facility])

## 2017-04-14 NOTE — TELEPHONE ENCOUNTER
Requested Provider:  Lala Falcon MD    PCP: Lala Falcon    Reason for visit: f/u upper abd pain    Duration of symptoms: on going    Have you been treated for this in the past? Yes    Additional comments: We made an appointment for the patient on 04/28/2017 but she would like to know if she can be worked in earlier?

## 2017-04-17 NOTE — PROGRESS NOTES
SUBJECTIVE:                                                    Bel Cruz is a 56 year old female who presents to clinic today for the following health issues:    HPI    ABDOMINAL PAIN follow up     Onset: 10 months--reviewed note from 12/2016 and ,symptoms very similar.  left abdomen, bloating, gassy, side ache.      Description:   Character: Dull ache and intermittent  Location: left lower quadrant  Radiation: None    Intensity: mild    Progression of Symptoms:  same    Accompanying Signs & Symptoms:  Fever/Chills?: no   Gas/Bloating: YES  Nausea: yes   Vomitting: no   Diarrhea?: no   Constipation:no   Dysuria or Hematuria: no    History:   Trauma: no   Previous similar pain: YES   Previous tests done: Colonoscopy    Precipitating factors:   Does the pain change with:     Food: no      BM: no     Urination: no     Alleviating factors:  none    Therapies Tried and outcome: antacids    LMP:  not applicable     Has had normal colonoscopy.  About a week ago had flare in symptoms with increased nausea, but now back to baseline issue.  Gas-X gave her some relief.  Hasn't tried Zantac, Pepcid or Tagamet.  Had normal CMP, lipase, TSH, CBC.    Has had intermittent vaginal discharge, she is in menopause and usually has vaginal discharge.  Used OTC vaginal check.  Denies incontinence.  No color, it was clear.  Not related to sexual activity.  No odor or itch.      Problem list and histories reviewed & adjusted, as indicated.  Additional history: as documented    Patient Active Problem List   Diagnosis     Hypothyroidism     Atrophic vaginitis     Hip pain, right     Past Surgical History:   Procedure Laterality Date     COLONOSCOPY  7/26/2011    Procedure:COMBINED COLONOSCOPY, REMOVE TUMOR/POLYP/LESION BY SNARE; single polyp removal; Surgeon:YUE LIMON; Location:[facility]     COLONOSCOPY Left 6/24/2016    Procedure: COMBINED COLONOSCOPY, SINGLE OR MULTIPLE BIOPSY/POLYPECTOMY BY BIOPSY;  Surgeon: Joseph Keyes  "MD Yumiko;  Location: [facility]     COLONOSCOPY WITH CO2 INSUFFLATION N/A 6/24/2016    Procedure: COLONOSCOPY WITH CO2 INSUFFLATION;  Surgeon: Joseph Keyes MD;  Location: [facility]     HC HYSTEROSCOPY DIAGOSTIC (SEPARATE PROC)  8/10/2004    Hysteroscopy, D&C, Endometrial ablation     PELVIS LAPAROSCOPY,DX  1996    Pelvic pain - adhesions and mild endometriosis     US BREAST CLIP PLACEMENT W BIOPSY LEFT         Social History   Substance Use Topics     Smoking status: Never Smoker     Smokeless tobacco: Never Used      Comment: no smokers in household     Alcohol use No     Family History   Problem Relation Age of Onset     Thyroid Disease Father      Hashimotos           ROS:  C: NEGATIVE for fever, chills, change in weight  E/M: NEGATIVE for ear, mouth and throat problems  R: NEGATIVE for significant cough or SOB  CV: NEGATIVE for chest pain, palpitations or peripheral edema    OBJECTIVE:                                                    /64  Pulse 60  Temp 98.3  F (36.8  C) (Temporal)  Resp 12  Ht 5' 8\" (1.727 m)  Wt 165 lb (74.8 kg)  LMP 07/18/2005  BMI 25.09 kg/m2  Body mass index is 25.09 kg/(m^2).  Gen: no apparent distress  NECK: no adenopathy, no asymmetry, no masses  Chest: clear to auscultation without wheeze, rale or rhonchi  Cor: regular rate and rhythm without murmur  ABDOMEN: soft, nontender, no masses and bowel sounds normal  Ext: warm and dry without edema  Psych: Alert and oriented times 3; coherent speech, normal   rate and volume, able to articulate logical thoughts, able   to abstract reason, no tangential thoughts, no hallucinations   or delusions  Her affect is neutral        ASSESSMENT/PLAN:                                                      1. LUQ abdominal pain  Check labs, obtain EGD.  If unremarkable, consider CT scan and possible pelvic US.  Has had adhesions in the past, perhaps discomfort is related to adhesions, so if unable to find etiology may consider " surgical referral.    - Tissue transglutaminase denisa IgA and IgG  -   - GASTROENTEROLOGY ADULT REF PROCEDURE ONLY  - Allergy adult food panel    2. Bloating  As above.  - Tissue transglutaminase denisa IgA and IgG  -   - GASTROENTEROLOGY ADULT REF PROCEDURE ONLY  - Allergy adult food panel    Lala Falcon MD  [facility]

## 2017-04-21 ENCOUNTER — OFFICE VISIT (OUTPATIENT)
Dept: FAMILY MEDICINE | Facility: OTHER | Age: 57
End: 2017-04-21
Payer: COMMERCIAL

## 2017-04-21 VITALS
WEIGHT: 165 LBS | TEMPERATURE: 98.3 F | HEIGHT: 68 IN | DIASTOLIC BLOOD PRESSURE: 64 MMHG | RESPIRATION RATE: 12 BRPM | BODY MASS INDEX: 25.01 KG/M2 | HEART RATE: 60 BPM | SYSTOLIC BLOOD PRESSURE: 120 MMHG

## 2017-04-21 DIAGNOSIS — R10.12 LUQ ABDOMINAL PAIN: Primary | ICD-10-CM

## 2017-04-21 DIAGNOSIS — R14.0 BLOATING: ICD-10-CM

## 2017-04-21 PROCEDURE — 99213 OFFICE O/P EST LOW 20 MIN: CPT | Performed by: FAMILY MEDICINE

## 2017-04-21 PROCEDURE — 83516 IMMUNOASSAY NONANTIBODY: CPT | Mod: 59 | Performed by: FAMILY MEDICINE

## 2017-04-21 PROCEDURE — 86003 ALLG SPEC IGE CRUDE XTRC EA: CPT | Performed by: FAMILY MEDICINE

## 2017-04-21 PROCEDURE — 86304 IMMUNOASSAY TUMOR CA 125: CPT | Performed by: FAMILY MEDICINE

## 2017-04-21 PROCEDURE — 36415 COLL VENOUS BLD VENIPUNCTURE: CPT | Performed by: FAMILY MEDICINE

## 2017-04-21 PROCEDURE — 83516 IMMUNOASSAY NONANTIBODY: CPT | Performed by: FAMILY MEDICINE

## 2017-04-21 NOTE — MR AVS SNAPSHOT
After Visit Summary   4/21/2017    Bel Cruz    MRN: [id number]           Patient Information     Date Of Birth          1960        Visit Information        Provider Department      4/21/2017 10:40 AM Lala Falcon MD [facility]        Today's Diagnoses     LUQ abdominal pain    -  1    Bloating           Follow-ups after your visit        Additional Services     GASTROENTEROLOGY ADULT REF PROCEDURE ONLY       Last Lab Result: Creatinine (mg/dL)       Date                     Value                 12/02/2016               0.80             ----------  Body mass index is 25.09 kg/(m^2).      Patient will be contacted to schedule procedure.     Please be aware that coverage of these services is subject to the terms and limitations of your health insurance plan.  Call member services at your health plan with any benefit or coverage questions.  Any procedures must be performed at a [place] facility OR coordinated by your clinic's referral office.    Please bring the following with you to your appointment:    (1) Any X-Rays, CTs or MRIs which have been performed.  Contact the facility where they were done to arrange for  prior to your scheduled appointment.    (2) List of current medications   (3) This referral request   (4) Any documents/labs given to you for this referral                  Your next 10 appointments already scheduled     Apr 27, 2017  3:10 PM CDT   JESSE Spine with Frankie Salvador PT   [facility] ([facility]  )    [address]  [place] [address] [phone]              Who to contact     If you have questions or need follow up information about today's clinic visit or your schedule please contact [facility] directly at [phone].  Normal or non-critical lab and imaging results will be communicated to you by MyChart, letter or phone within 4 business days  "after the clinic has received the results. If you do not hear from us within 7 days, please contact the clinic through [employer] or phone. If you have a critical or abnormal lab result, we will notify you by phone as soon as possible.  Submit refill requests through [employer] or call your pharmacy and they will forward the refill request to us. Please allow 3 business days for your refill to be completed.          Additional Information About Your Visit        [employer] Information     [employer] gives you secure access to your electronic health record. If you see a primary care provider, you can also send messages to your care team and make appointments. If you have questions, please call your primary care clinic.  If you do not have a primary care provider, please call [phone] and they will assist you.        Care EveryWhere ID     This is your Care EveryWhere ID. This could be used by other organizations to access your [place] medical records  [id number]        Your Vitals Were     Pulse Temperature Respirations Height Last Period BMI (Body Mass Index)    60 98.3  F (36.8  C) (Temporal) 12 5' 8\" (1.727 m) 07/18/2005 25.09 kg/m2       Blood Pressure from Last 3 Encounters:   04/21/17 120/64   01/17/17 120/80   01/04/17 118/78    Weight from Last 3 Encounters:   04/21/17 165 lb (74.8 kg)   01/17/17 169 lb 12.8 oz (77 kg)   01/04/17 168 lb (76.2 kg)              We Performed the Following     Allergy adult food panel          GASTROENTEROLOGY ADULT REF PROCEDURE ONLY     Tissue transglutaminase denisa IgA and IgG        Primary Care Provider Office Phone # Fax #    Lala Flacon [phone]       [facility] [address]  [facility] [address]        Thank you!     Thank you for choosing [facility]  for your care. Our goal is always to provide you with excellent care. Hearing back from our patients is one way we can continue to improve our services. Please " take a few minutes to complete the written survey that you may receive in the mail after your visit with us. Thank you!             Your Updated Medication List - Protect others around you: Learn how to safely use, store and throw away your medicines at www.disposemymeds.org.          This list is accurate as of: 4/21/17 11:31 AM.  Always use your most recent med list.                   Brand Name Dispense Instructions for use    IBUPROFEN PO      Take 600 mg by mouth every 6 hours as needed for moderate pain       levothyroxine 112 MCG tablet    SYNTHROID/LEVOTHROID    90 tablet    Take 1 tablet (112 mcg) by mouth daily       OMEGA-3 FISH OIL PO      Take by mouth daily       WOMENS MULTIVITAMIN PLUS PO      Take 1 tablet by mouth daily. Over 50

## 2017-04-21 NOTE — NURSING NOTE
"Chief Complaint   Patient presents with     Abdominal Pain     Panel Management     [place], Honoring wilian       Initial /64  Pulse 60  Temp 98.3  F (36.8  C) (Temporal)  Resp 12  Ht 5' 8\" (1.727 m)  Wt 165 lb (74.8 kg)  LMP 07/18/2005  BMI 25.09 kg/m2 Estimated body mass index is 25.09 kg/(m^2) as calculated from the following:    Height as of this encounter: 5' 8\" (1.727 m).    Weight as of this encounter: 165 lb (74.8 kg).  Medication Reconciliation: complete   Evonne West CMA    "

## 2017-04-22 LAB — CANCER AG125 SERPL-ACNC: 8 U/ML (ref 0–30)

## 2017-04-25 LAB
TTG IGA SER-ACNC: 3 U/ML
TTG IGG SER-ACNC: 1 U/ML

## 2017-04-26 LAB
CLAM IGE QN: NORMAL KU(A)/L
CODFISH IGE QN: NORMAL KU(A)/L
CORN IGE QN: NORMAL KU(A)/L
COW MILK IGE QN: NORMAL KU/L
EGG WHITE IGE QN: NORMAL KU(A)/L
PEANUT IGE QN: NORMAL KU(A)/L
SCALLOP IGE QN: NORMAL KU(A)/L
SHRIMP IGE QN: NORMAL KU(A)/L
SOYBEAN IGE QN: NORMAL KU(A)/L
WALNUT IGE QN: NORMAL KU(A)/L
WHEAT IGE QN: NORMAL KU(A)/L

## 2017-04-27 ENCOUNTER — MYC MEDICAL ADVICE (OUTPATIENT)
Dept: FAMILY MEDICINE | Facility: OTHER | Age: 57
End: 2017-04-27

## 2017-04-27 ENCOUNTER — THERAPY VISIT (OUTPATIENT)
Dept: PHYSICAL THERAPY | Facility: CLINIC | Age: 57
End: 2017-04-27
Payer: COMMERCIAL

## 2017-04-27 DIAGNOSIS — M25.551 HIP PAIN, RIGHT: ICD-10-CM

## 2017-04-27 PROCEDURE — 97140 MANUAL THERAPY 1/> REGIONS: CPT | Mod: GP | Performed by: PHYSICAL THERAPIST

## 2017-04-27 PROCEDURE — 97112 NEUROMUSCULAR REEDUCATION: CPT | Mod: GP | Performed by: PHYSICAL THERAPIST

## 2017-04-27 PROCEDURE — 97110 THERAPEUTIC EXERCISES: CPT | Mod: GP | Performed by: PHYSICAL THERAPIST

## 2017-04-27 NOTE — PROGRESS NOTES
"Subjective:    HPI                    Objective:    System    Physical Exam    General     ROS    Assessment/Plan:      PROGRESS  REPORT    Progress reporting period is from 3/9/17 to 4/27/17.       SUBJECTIVE  Subjective changes noted by patient:  doing more stuff at gym classes for balance, cycling, pilotes classes, was doing one exercise in class where she was standing on one foot and sliding R foot into abd/add, has cut back on use of ibuprofen     Current Pain level: 2/10.     Previous pain level was  0-2/10 Initial Pain level: 7/10.   Changes in function:  Yes (See Goal flowsheet attached for changes in current functional level)  Adverse reaction to treatment or activity: activity - poor technique at exercise class    OBJECTIVE  Changes noted in objective findings:    Objective: hypomobile T8-T12, MMT: hip abd 4+/5, hip ext 4/5, hip ER 4+/5, tender R tensor, milld-mod medial collapse with 6\" ant step down R, mild medial collapse L     ASSESSMENT/PLAN  Updated problem list and treatment plan: Diagnosis 1:  R hip/leg pain consistent with labral tear R hip with compensation of low back  Pain -  manual therapy, self management, education and home program  Decreased strength - therapeutic exercise, therapeutic activities and home program  STG/LTGs have been met or progress has been made towards goals:  Yes (See Goal flow sheet completed today.)  Assessment of Progress: The patient's condition is improving.  Self Management Plans:  Patient has been instructed in a home treatment program.  I have re-evaluated this patient and find that the nature, scope, duration and intensity of the therapy is appropriate for the medical condition of the patient.  Bel continues to require the following intervention to meet STG and LTG's:  PT    Recommendations:  This patient would benefit from continued therapy.     Frequency:  1 X/3 week, once daily  Duration:  for 3 weeks        Please refer to the daily flowsheet for treatment " today, total treatment time and time spent performing 1:1 timed codes.            Frankie Salvador,PT, DPT, OCS

## 2017-04-27 NOTE — MR AVS SNAPSHOT
After Visit Summary   4/27/2017    Bel Cruz    MRN: [id number]           Patient Information     Date Of Birth          1960        Visit Information        Provider Department      4/27/2017 3:10 PM Frankie Salvador, PT [facility]        Today's Diagnoses     Hip pain, right           Follow-ups after your visit        Your next 10 appointments already scheduled     May 22, 2017  2:50 PM CDT   JESSE Spine with Frankie Salvador PT   [facility] ([facility] [place]  )    [address]  [facility] [address] [phone]              Who to contact     If you have questions or need follow up information about today's clinic visit or your schedule please contact [facility] directly at [phone].  Normal or non-critical lab and imaging results will be communicated to you by MyChart, letter or phone within 4 business days after the clinic has received the results. If you do not hear from us within 7 days, please contact the clinic through [employer] or phone. If you have a critical or abnormal lab result, we will notify you by phone as soon as possible.  Submit refill requests through [employer] or call your pharmacy and they will forward the refill request to us. Please allow 3 business days for your refill to be completed.          Additional Information About Your Visit        MyChart Information     [employer] gives you secure access to your electronic health record. If you see a primary care provider, you can also send messages to your care team and make appointments. If you have questions, please call your primary care clinic.  If you do not have a primary care provider, please call [phone] and they will assist you.        Care EveryWhere ID     This is your Care EveryWhere ID. This could be used by other organizations to access your  [place] medical records  [id number]        Your Vitals Were     Last Period                   07/18/2005            Blood Pressure from Last 3 Encounters:   04/21/17 120/64   01/17/17 120/80   01/04/17 118/78    Weight from Last 3 Encounters:   04/21/17 74.8 kg (165 lb)   01/17/17 77 kg (169 lb 12.8 oz)   01/04/17 76.2 kg (168 lb)              We Performed the Following     JESSE PROGRESS NOTES REPORT     MANUAL THER TECH,1+REGIONS,EA 15 MIN     NEUROMUSCULAR RE-EDUCATION     THERAPEUTIC EXERCISES        Primary Care Provider Office Phone # Fax #    Lala CHRISTOPHER Falcon [phone]       [facility] [address]  [facility] [address]        Thank you!     Thank you for choosing [place] [facility]  for your care. Our goal is always to provide you with excellent care. Hearing back from our patients is one way we can continue to improve our services. Please take a few minutes to complete the written survey that you may receive in the mail after your visit with us. Thank you!             Your Updated Medication List - Protect others around you: Learn how to safely use, store and throw away your medicines at www.disposemymeds.org.          This list is accurate as of: 4/27/17  4:02 PM.  Always use your most recent med list.                   Brand Name Dispense Instructions for use    IBUPROFEN PO      Take 600 mg by mouth every 6 hours as needed for moderate pain       levothyroxine 112 MCG tablet    SYNTHROID/LEVOTHROID    90 tablet    Take 1 tablet (112 mcg) by mouth daily       OMEGA-3 FISH OIL PO      Take by mouth daily       WOMENS MULTIVITAMIN PLUS PO      Take 1 tablet by mouth daily. Over 50

## 2017-05-01 ENCOUNTER — MYC MEDICAL ADVICE (OUTPATIENT)
Dept: FAMILY MEDICINE | Facility: OTHER | Age: 57
End: 2017-05-01

## 2017-05-08 ENCOUNTER — SURGERY (OUTPATIENT)
Age: 57
End: 2017-05-08

## 2017-05-08 ENCOUNTER — HOSPITAL ENCOUNTER (OUTPATIENT)
Facility: CLINIC | Age: 57
Discharge: HOME | End: 2017-05-08
Attending: INTERNAL MEDICINE | Admitting: INTERNAL MEDICINE
Payer: COMMERCIAL

## 2017-05-08 VITALS
SYSTOLIC BLOOD PRESSURE: 89 MMHG | RESPIRATION RATE: 16 BRPM | OXYGEN SATURATION: 99 % | DIASTOLIC BLOOD PRESSURE: 58 MMHG | TEMPERATURE: 98.2 F

## 2017-05-08 LAB — UPPER GI ENDOSCOPY: NORMAL

## 2017-05-08 PROCEDURE — 88342 IMHCHEM/IMCYTCHM 1ST ANTB: CPT | Mod: 26 | Performed by: INTERNAL MEDICINE

## 2017-05-08 PROCEDURE — 40000296 ZZH STATISTIC ENDO RECOVERY CLASS 1:2 FIRST HOUR: Performed by: INTERNAL MEDICINE

## 2017-05-08 PROCEDURE — 40000297 ZZH STATISTIC ENDO RECOVERY CLASS 1:2 EACH ADDL HOUR: Performed by: INTERNAL MEDICINE

## 2017-05-08 PROCEDURE — 88305 TISSUE EXAM BY PATHOLOGIST: CPT | Performed by: INTERNAL MEDICINE

## 2017-05-08 PROCEDURE — 43239 EGD BIOPSY SINGLE/MULTIPLE: CPT | Performed by: INTERNAL MEDICINE

## 2017-05-08 PROCEDURE — 88305 TISSUE EXAM BY PATHOLOGIST: CPT | Mod: 26,59 | Performed by: INTERNAL MEDICINE

## 2017-05-08 PROCEDURE — 25000128 H RX IP 250 OP 636: Performed by: INTERNAL MEDICINE

## 2017-05-08 PROCEDURE — 88342 IMHCHEM/IMCYTCHM 1ST ANTB: CPT | Performed by: INTERNAL MEDICINE

## 2017-05-08 PROCEDURE — 25000125 ZZHC RX 250: Performed by: INTERNAL MEDICINE

## 2017-05-08 RX ORDER — LIDOCAINE 40 MG/G
CREAM TOPICAL
Status: DISCONTINUED | OUTPATIENT
Start: 2017-05-08 | End: 2017-05-08 | Stop reason: HOSPADM

## 2017-05-08 RX ORDER — ONDANSETRON 2 MG/ML
4 INJECTION INTRAMUSCULAR; INTRAVENOUS
Status: DISCONTINUED | OUTPATIENT
Start: 2017-05-08 | End: 2017-05-08 | Stop reason: HOSPADM

## 2017-05-08 RX ORDER — FENTANYL CITRATE 50 UG/ML
INJECTION, SOLUTION INTRAMUSCULAR; INTRAVENOUS PRN
Status: DISCONTINUED | OUTPATIENT
Start: 2017-05-08 | End: 2017-05-08 | Stop reason: HOSPADM

## 2017-05-08 RX ADMIN — FENTANYL CITRATE 50 MCG: 50 INJECTION, SOLUTION INTRAMUSCULAR; INTRAVENOUS at 09:19

## 2017-05-08 RX ADMIN — MIDAZOLAM HYDROCHLORIDE 1 MG: 1 INJECTION, SOLUTION INTRAMUSCULAR; INTRAVENOUS at 09:18

## 2017-05-08 RX ADMIN — MIDAZOLAM HYDROCHLORIDE 2 MG: 1 INJECTION, SOLUTION INTRAMUSCULAR; INTRAVENOUS at 09:15

## 2017-05-08 RX ADMIN — MIDAZOLAM HYDROCHLORIDE 1 MG: 1 INJECTION, SOLUTION INTRAMUSCULAR; INTRAVENOUS at 09:16

## 2017-05-08 RX ADMIN — FENTANYL CITRATE 50 MCG: 50 INJECTION, SOLUTION INTRAMUSCULAR; INTRAVENOUS at 09:15

## 2017-05-08 RX ADMIN — LIDOCAINE HYDROCHLORIDE 5 ML: 20 SOLUTION ORAL; TOPICAL at 09:15

## 2017-05-08 RX ADMIN — MIDAZOLAM HYDROCHLORIDE 1 MG: 1 INJECTION, SOLUTION INTRAMUSCULAR; INTRAVENOUS at 09:17

## 2017-05-08 NOTE — H&P
[facility] GI Pre-Procedure Physical Assessment    Bel Cruz MRN# [id number]   Age: 56 year old YOB: 1960      Date of Surgery: 5/8/2017  Location [facility]      Date of Exam 5/8/2017 Facility (Same day)       Home clinic: [facility]  Primary care provider: Lala Falcon         Active problem list:   Patient Active Problem List   Diagnosis     Hypothyroidism     Atrophic vaginitis     Hip pain, right            Medications (include herbals and vitamins):   Any Plavix use in the last 7 days?  No     Current Facility-Administered Medications   Medication     lidocaine 1 % 1 mL     lidocaine (LMX4) kit     sodium chloride (PF) 0.9% PF flush 3 mL     sodium chloride (PF) 0.9% PF flush 3 mL     sodium chloride (PF) 0.9% PF flush 3 mL     ondansetron (ZOFRAN) injection 4 mg             Allergies:      Allergies   Allergen Reactions     Amoxicillin      tightness in throat     Cephalosporins      ceftin     Clindamycin Base      rash     Erythromycin      Welts     Macrolides      Sulfa Drugs      bactrim     Allergy to Latex?  No  Allergy to tape?    No          Social History:     Social History   Substance Use Topics     Smoking status: Never Smoker     Smokeless tobacco: Never Used      Comment: no smokers in household     Alcohol use No            Physical Exam:   All vitals have been reviewed  Blood pressure (!) 159/97, temperature 98.2  F (36.8  C), resp. rate 16, last menstrual period 07/18/2005, SpO2 98 %, not currently breastfeeding.  Airway assessment:   Patient is able to open mouth wide  Patient is able to stick out tongue      Lungs:   No increased work of breathing, good air exchange, clear to auscultation bilaterally, no crackles or wheezing      Cardiovascular:   Normal apical impulse, regular rate and rhythm, normal S1 and S2, no S3 or S4, and no murmur noted           Lab / Radiology Results:   All laboratory data reviewed           Assessment:   Appropriately NPO  Chief complaint or anatomic assessment of involved area: LUQ pain         Plan:   Moderate (conscious) sedation     Patient's active problems diagnostically and therapeutically optimized for the planned procedure  Risks, benefits, alternatives to sedation and blood explained and consent obtained  Risks, benefits, alternatives to procedure explained and consent obtained  P1 (normal healthy patient)  Orders and progress notes are in the chart  Discharge from Phase 1 and / or Phase 2 recovery when patient meets criteria    I have reviewed the history and physical, lab finding(s), diagnostic data, medicaitons, and the plan for sedation.  I have determined this patient to be an appropriate candidate for the planned sedation / procedure and have reassessed the patient immediately prior to sedation / procedure.    I have personally and medically directed the administration of medications used.    Chavez Land MD

## 2017-05-10 LAB — COPATH REPORT: NORMAL

## 2017-05-22 ENCOUNTER — TRANSFERRED RECORDS (OUTPATIENT)
Dept: HEALTH INFORMATION MANAGEMENT | Facility: CLINIC | Age: 57
End: 2017-05-22

## 2017-05-22 ENCOUNTER — THERAPY VISIT (OUTPATIENT)
Dept: PHYSICAL THERAPY | Facility: CLINIC | Age: 57
End: 2017-05-22
Payer: COMMERCIAL

## 2017-05-22 DIAGNOSIS — M25.551 HIP PAIN, RIGHT: ICD-10-CM

## 2017-05-22 PROCEDURE — 97110 THERAPEUTIC EXERCISES: CPT | Mod: GP | Performed by: PHYSICAL THERAPIST

## 2017-05-22 PROCEDURE — 97112 NEUROMUSCULAR REEDUCATION: CPT | Mod: GP | Performed by: PHYSICAL THERAPIST

## 2017-05-22 ASSESSMENT — ACTIVITIES OF DAILY LIVING (ADL)
GETTING_INTO_AND_OUT_OF_AN_AVERAGE_CAR: NO DIFFICULTY AT ALL
HOS_ADL_SCORE(%): 95.59
PUTTING_ON_SOCKS_AND_SHOES: NO DIFFICULTY AT ALL
TWISTING/PIVOTING_ON_INVOLVED_LEG: SLIGHT DIFFICULTY
WALKING_INITIALLY: SLIGHT DIFFICULTY
HOS_ADL_ITEM_SCORE_TOTAL: 65
WALKING_UP_STEEP_HILLS: NO DIFFICULTY AT ALL
HOS_ADL_COUNT: 17
HOS_ADL_HIGHEST_POTENTIAL_SCORE: 68
WALKING_DOWN_STEEP_HILLS: NO DIFFICULTY AT ALL
GOING_DOWN_1_FLIGHT_OF_STAIRS: NO DIFFICULTY AT ALL
DEEP_SQUATTING: NO DIFFICULTY AT ALL
WALKING_15_MINUTES_OR_GREATER: NO DIFFICULTY AT ALL
LIGHT_TO_MODERATE_WORK: NO DIFFICULTY AT ALL
GETTING_INTO_AND_OUT_OF_A_BATHTUB: NO DIFFICULTY AT ALL
STEPPING_UP_AND_DOWN_CURBS: NO DIFFICULTY AT ALL
GOING_UP_1_FLIGHT_OF_STAIRS: NO DIFFICULTY AT ALL
HEAVY_WORK: NO DIFFICULTY AT ALL
RECREATIONAL_ACTIVITIES: SLIGHT DIFFICULTY
ROLLING_OVER_IN_BED: NO DIFFICULTY AT ALL
WALKING_APPROXIMATELY_10_MINUTES: NO DIFFICULTY AT ALL
STANDING_FOR_15_MINUTES: NO DIFFICULTY AT ALL
SITTING_FOR_15_MINUTES: NO DIFFICULTY AT ALL

## 2017-05-22 NOTE — MR AVS SNAPSHOT
After Visit Summary   5/22/2017    Bel Cruz    MRN: [id number]           Patient Information     Date Of Birth          1960        Visit Information        Provider Department      5/22/2017 2:50 PM Frankie Salvador PT [facility]        Today's Diagnoses     Hip pain, right           Follow-ups after your visit        Who to contact     If you have questions or need follow up information about today's clinic visit or your schedule please contact [facility] directly at [phone].  Normal or non-critical lab and imaging results will be communicated to you by [employer], letter or phone within 4 business days after the clinic has received the results. If you do not hear from us within 7 days, please contact the clinic through [employer] or phone. If you have a critical or abnormal lab result, we will notify you by phone as soon as possible.  Submit refill requests through [employer] or call your pharmacy and they will forward the refill request to us. Please allow 3 business days for your refill to be completed.          Additional Information About Your Visit        MyChart Information     [employer] gives you secure access to your electronic health record. If you see a primary care provider, you can also send messages to your care team and make appointments. If you have questions, please call your primary care clinic.  If you do not have a primary care provider, please call [phone] and they will assist you.        Care EveryWhere ID     This is your Care EveryWhere ID. This could be used by other organizations to access your [facility] medical records  [id number]        Your Vitals Were     Last Period                   07/18/2005            Blood Pressure from Last 3 Encounters:   05/08/17 (!) 89/58   04/21/17 120/64   01/17/17 120/80    Weight from Last 3 Encounters:   04/21/17 74.8 kg (165 lb)    01/17/17 77 kg (169 lb 12.8 oz)   01/04/17 76.2 kg (168 lb)              We Performed the Following     JESSE PROGRESS NOTES REPORT     NEUROMUSCULAR RE-EDUCATION     THERAPEUTIC EXERCISES        Primary Care Provider Office Phone # Fax #    Lala CHRISTOPHER MD [place] [phone]       [facility] [address]  [facility] [address]        Thank you!     Thank you for choosing [place] [facility] PH[facility]ICAL THER[facility]  for your care. Our goal is always to provide you with excellent care. Hearing back from our patients is one way we can continue to improve our services. Please take a few minutes to complete the written survey that you may receive in the mail after your visit with us. Thank you!             Your Updated Medication List - Protect others around you: Learn how to safely use, store and throw away your medicines at www.disposemymeds.org.          This list is accurate as of: 5/22/17  3:36 PM.  Always use your most recent med list.                   Brand Name Dispense Instructions for use    IBUPROFEN PO      Take 600 mg by mouth every 6 hours as needed for moderate pain       levothyroxine 112 MCG tablet    SYNTHROID/LEVOTHROID    90 tablet    Take 1 tablet (112 mcg) by mouth daily       OMEGA-3 FISH OIL PO      Take by mouth daily       WOMENS MULTIVITAMIN PLUS PO      Take 1 tablet by mouth daily. Over 50

## 2017-05-22 NOTE — PROGRESS NOTES
Subjective:    HPI  Oswestry Score: 4 %                 Objective:    System    Physical Exam    General     ROS    Assessment/Plan:      DISCHARGE REPORT    Progress reporting period is from 4/27/17 to 5/22/17.       SUBJECTIVE  Subjective changes noted by patient:  doing very well, able to hike for 8 miles without pain, will occasionally have some tightness with lateral hip, will rarely wake due to pain if she lays funny on her hip,     Current Pain level: 0-2/10.     Previous pain level was  2/10 Initial Pain level: 7/10.   Changes in function:  Yes (See Goal flowsheet attached for changes in current functional level)  Adverse reaction to treatment or activity: None    OBJECTIVE  Changes noted in objective findings:    Objective: MMT: hip abd 4+/5, hip ER 4+/5, hip ext 4/5, double lowering 85%, mild tenderness R tensor muscle, lumbar ROM wnl, R hip AROM wnl, balance: SLS with a/m reach: R78cm, L 79cm, a/l reach: R 78cm, L 70 cm    ASSESSMENT/PLAN  Updated problem list and treatment plan: Diagnosis 1:  R hip/leg pain consistent with labral tear R hip with compensation of low back     Pain -  home program  Decreased strength - home program  STG/LTGs have been met or progress has been made towards goals:  Yes (See Goal flow sheet completed today.)  Assessment of Progress: The patient has met all of their long term goals.  Self Management Plans:  Patient has been instructed in a home treatment program.  I have re-evaluated this patient and find that the nature, scope, duration and intensity of the therapy is appropriate for the medical condition of the patient.  Bel continues to require the following intervention to meet STG and LTG's:  PT intervention is no longer required to meet STG/LTG.    Recommendations:  This patient is ready to be discharged from therapy and continue their home treatment program.    Please refer to the daily flowsheet for treatment today, total treatment time and time spent performing 1:1  timed codes.        Frankie Salvador,PT, DPT, OCS

## 2017-06-09 ENCOUNTER — MYC MEDICAL ADVICE (OUTPATIENT)
Dept: FAMILY MEDICINE | Facility: OTHER | Age: 57
End: 2017-06-09

## 2017-06-09 DIAGNOSIS — R10.32 LLQ ABDOMINAL PAIN: Primary | ICD-10-CM

## 2017-06-14 ENCOUNTER — MYC MEDICAL ADVICE (OUTPATIENT)
Dept: FAMILY MEDICINE | Facility: OTHER | Age: 57
End: 2017-06-14

## 2017-06-15 ENCOUNTER — HOSPITAL ENCOUNTER (OUTPATIENT)
Dept: CT IMAGING | Facility: CLINIC | Age: 57
Discharge: HOME | End: 2017-06-15
Attending: FAMILY MEDICINE | Admitting: FAMILY MEDICINE
Payer: COMMERCIAL

## 2017-06-15 DIAGNOSIS — R10.32 LLQ ABDOMINAL PAIN: ICD-10-CM

## 2017-06-15 PROCEDURE — 25000128 H RX IP 250 OP 636: Performed by: FAMILY MEDICINE

## 2017-06-15 PROCEDURE — 74177 CT ABD & PELVIS W/CONTRAST: CPT

## 2017-06-15 PROCEDURE — 25000125 ZZHC RX 250: Performed by: FAMILY MEDICINE

## 2017-06-15 RX ORDER — IOPAMIDOL 755 MG/ML
500 INJECTION, SOLUTION INTRAVASCULAR ONCE
Status: COMPLETED | OUTPATIENT
Start: 2017-06-15 | End: 2017-06-15

## 2017-06-15 RX ADMIN — SODIUM CHLORIDE 60 ML: 9 INJECTION, SOLUTION INTRAVENOUS at 08:54

## 2017-06-15 RX ADMIN — IOPAMIDOL 80 ML: 755 INJECTION, SOLUTION INTRAVENOUS at 08:54

## 2017-06-22 ENCOUNTER — MYC MEDICAL ADVICE (OUTPATIENT)
Dept: FAMILY MEDICINE | Facility: OTHER | Age: 57
End: 2017-06-22

## 2017-06-22 DIAGNOSIS — R10.32 LLQ ABDOMINAL PAIN: Primary | ICD-10-CM

## 2017-06-22 NOTE — TELEPHONE ENCOUNTER
Looks like she had her EGD  w/[facility] but is scheduled for her GI consult at the .  Did you want her to follow at the  or should try [facility]?  They are booking out a bit too but I don't think that far.  Otherwise [facility] can get people in fairly soon.

## 2017-06-23 NOTE — TELEPHONE ENCOUNTER
I'm fine with [facility] or [facility].  Colonoscopy could be either at [facility] or [facility], whichever she prefers, but it looks like she wants it done next week before 7/1 and so not sure if we can get her in.    If she wants an e-visit, just have her click the appropriate button in [employer], or perhaps she wants a telephone visit instead?

## 2017-06-23 NOTE — TELEPHONE ENCOUNTER
Soonest available is at [facility].  They will email her instructions.  Info forwarded to them.  She is scheduled for colonoscopy at the [place] location for 6/27/17 and consult at [facility] location 6/30/17.  Patient is aware and she already rec'd their email.

## 2017-06-27 ENCOUNTER — TRANSFERRED RECORDS (OUTPATIENT)
Dept: HEALTH INFORMATION MANAGEMENT | Facility: CLINIC | Age: 57
End: 2017-06-27

## 2017-06-30 ENCOUNTER — TRANSFERRED RECORDS (OUTPATIENT)
Dept: HEALTH INFORMATION MANAGEMENT | Facility: CLINIC | Age: 57
End: 2017-06-30

## 2018-02-12 DIAGNOSIS — E03.9 HYPOTHYROIDISM, UNSPECIFIED TYPE: ICD-10-CM

## 2018-02-12 RX ORDER — LEVOTHYROXINE SODIUM 112 UG/1
TABLET ORAL
Qty: 30 TABLET | Refills: 0 | Status: SHIPPED | OUTPATIENT
Start: 2018-02-12 | End: 2018-03-13

## 2018-03-06 NOTE — PROGRESS NOTES
SUBJECTIVE:   CC: Bel Cruz is an 57 year old woman who presents for preventive health visit.     Physical   Annual:     Getting at least 3 servings of Calcium per day::  Yes    Bi-annual eye exam::  NO    Dental care twice a year::  Yes    Sleep apnea or symptoms of sleep apnea::  None    Diet::  Regular (no restrictions)    Frequency of exercise::  2-3 days/week    Duration of exercise::  30-45 minutes    Taking medications regularly::  Yes    Medication side effects::  None    Additional concerns today::  YES (chronic congestion, abdominal pain, right hip pain )            Today's PHQ-2 Score:   PHQ-2 ( 1999 [employer]) 3/13/2018   Q1: Little interest or pleasure in doing things 0   Q2: Feeling down, depressed or hopeless 0   PHQ-2 Score 0   Q1: Little interest or pleasure in doing things Not at all   Q2: Feeling down, depressed or hopeless Not at all   PHQ-2 Score 0   Answers for HPI/ROS submitted by the patient on 3/13/2018   PHQ-2 Score: 0      Abuse: Current or Past(Physical, Sexual or Emotional)- No  Do you feel safe in your environment - Yes    Social History   Substance Use Topics     Smoking status: Never Smoker     Smokeless tobacco: Never Used      Comment: no smokers in household     Alcohol use No     No flowsheet data found.No flowsheet data found.    Reviewed orders with patient.  Reviewed health maintenance and updated orders accordingly - Yes    Patient over age 50, mutual decision to screen reflected in health maintenance.    Pertinent mammograms are reviewed under the imaging tab.  History of abnormal Pap smear: NO - age 30-65 PAP every 5 years with negative HPV co-testing recommended    Reviewed and updated as needed this visit by clinical staff  Tobacco  Allergies  Meds  Problems  Med Hx  Surg Hx  Fam Hx  Soc Hx          Reviewed and updated as needed this visit by Provider  Allergies  Meds  Problems            Review of Systems   Constitutional: Negative for chills and fever.  "  HENT: Positive for congestion. Negative for ear pain, hearing loss and sore throat.    Eyes: Positive for visual disturbance. Negative for pain.   Respiratory: Negative for cough and shortness of breath.    Cardiovascular: Negative for chest pain, palpitations and peripheral edema.   Gastrointestinal: Positive for abdominal pain. Negative for constipation, diarrhea, heartburn, hematochezia and nausea.   Genitourinary: Negative for dysuria, frequency, genital sores, hematuria, pelvic pain, urgency, vaginal bleeding and vaginal discharge.   Musculoskeletal: Positive for arthralgias. Negative for joint swelling and myalgias.   Skin: Negative for rash.   Neurological: Negative for dizziness, weakness, headaches and paresthesias.   Psychiatric/Behavioral: Negative for mood changes. The patient is not nervous/anxious.           OBJECTIVE:   /68 (BP Location: Left arm, Patient Position: Chair, Cuff Size: Adult Regular)  Pulse 68  Temp 98  F (36.7  C) (Temporal)  Resp 16  Ht 5' 8.7\" (1.745 m)  Wt 166 lb (75.3 kg)  LMP 07/18/2005  SpO2 99%  BMI 24.73 kg/m2  Physical Exam   Constitutional: She is oriented to person, place, and time. She appears well-developed and well-nourished. No distress.   HENT:   Right Ear: Tympanic membrane and external ear normal.   Left Ear: Tympanic membrane and external ear normal.   Nose: Nose normal.   Mouth/Throat: Oropharynx is clear and moist. No oropharyngeal exudate.   Eyes: Conjunctivae are normal. Pupils are equal, round, and reactive to light. Right eye exhibits no discharge. Left eye exhibits no discharge.   Neck: Neck supple. No tracheal deviation present. No thyromegaly present.   Cardiovascular: Normal rate, regular rhythm, S1 normal, S2 normal, normal heart sounds and normal pulses.  Exam reveals no S3, no S4 and no friction rub.    No murmur heard.  Pulmonary/Chest: Effort normal and breath sounds normal. No respiratory distress. She has no wheezes. She has no rales. " "  Abdominal: Soft. Bowel sounds are normal. She exhibits no mass. There is no hepatosplenomegaly. There is no tenderness.   Genitourinary: No breast swelling, tenderness or discharge.   Musculoskeletal: Normal range of motion. She exhibits no edema.   Lymphadenopathy:     She has no cervical adenopathy.   Neurological: She is alert and oriented to person, place, and time. She has normal strength and normal reflexes. She exhibits normal muscle tone.   Skin: Skin is warm and dry. No rash noted.   Psychiatric: She has a normal mood and affect. Judgment and thought content normal. Cognition and memory are normal.         ASSESSMENT/PLAN:   1. Encounter for routine adult health examination without abnormal findings      2. Hypothyroidism, unspecified type  Labs drawn, refills given.    - TSH WITH FREE T4 REFLEX  - levothyroxine (SYNTHROID/LEVOTHROID) 112 MCG tablet; TAKE 1 TABLET(112 MCG) BY MOUTH DAILY  Dispense: 90 tablet; Refill: 3    3. Atrophic vaginitis  She remembers that using Estring was very helpful with vaginal dryness and inquires again.  Also discussed topical estrogen cream, she would like to try that instead.    - conjugated estrogens (PREMARIN) cream; Place 0.5 g vaginally twice a week  Dispense: 30 g; Refill: 3    COUNSELING:  Reviewed preventive health counseling, as reflected in patient instructions         reports that she has never smoked. She has never used smokeless tobacco.    Estimated body mass index is 24.73 kg/(m^2) as calculated from the following:    Height as of this encounter: 5' 8.7\" (1.745 m).    Weight as of this encounter: 166 lb (75.3 kg).       Counseling Resources:  ATP IV Guidelines  Pooled Cohorts Equation Calculator  Breast Cancer Risk Calculator  FRAX Risk Assessment  ICSI Preventive Guidelines  Dietary Guidelines for Americans, 2010  [employer] MyPlate  ASA Prophylaxis  Lung CA Screening    Lala Falcon MD  [facility]"

## 2018-03-13 ENCOUNTER — OFFICE VISIT (OUTPATIENT)
Dept: FAMILY MEDICINE | Facility: OTHER | Age: 58
End: 2018-03-13
Payer: COMMERCIAL

## 2018-03-13 VITALS
BODY MASS INDEX: 24.59 KG/M2 | RESPIRATION RATE: 16 BRPM | DIASTOLIC BLOOD PRESSURE: 68 MMHG | HEIGHT: 69 IN | WEIGHT: 166 LBS | SYSTOLIC BLOOD PRESSURE: 104 MMHG | HEART RATE: 68 BPM | OXYGEN SATURATION: 99 % | TEMPERATURE: 98 F

## 2018-03-13 DIAGNOSIS — Z00.00 ENCOUNTER FOR ROUTINE ADULT HEALTH EXAMINATION WITHOUT ABNORMAL FINDINGS: Primary | ICD-10-CM

## 2018-03-13 DIAGNOSIS — E03.9 HYPOTHYROIDISM, UNSPECIFIED TYPE: ICD-10-CM

## 2018-03-13 DIAGNOSIS — N95.2 ATROPHIC VAGINITIS: ICD-10-CM

## 2018-03-13 LAB — TSH SERPL DL<=0.005 MIU/L-ACNC: 1.54 MU/L (ref 0.4–4)

## 2018-03-13 PROCEDURE — 99396 PREV VISIT EST AGE 40-64: CPT | Performed by: FAMILY MEDICINE

## 2018-03-13 PROCEDURE — 84443 ASSAY THYROID STIM HORMONE: CPT | Performed by: FAMILY MEDICINE

## 2018-03-13 PROCEDURE — 36415 COLL VENOUS BLD VENIPUNCTURE: CPT | Performed by: FAMILY MEDICINE

## 2018-03-13 RX ORDER — LEVOTHYROXINE SODIUM 112 UG/1
TABLET ORAL
Qty: 90 TABLET | Refills: 3 | Status: SHIPPED | OUTPATIENT
Start: 2018-03-13 | End: 2019-04-11

## 2018-03-13 ASSESSMENT — ENCOUNTER SYMPTOMS
ARTHRALGIAS: 1
WEAKNESS: 0
PARESTHESIAS: 0
FEVER: 0
HEMATOCHEZIA: 0
MYALGIAS: 0
DYSURIA: 0
CONSTIPATION: 0
EYE PAIN: 0
SORE THROAT: 0
COUGH: 0
HEADACHES: 0
HEARTBURN: 0
PALPITATIONS: 0
NAUSEA: 0
FREQUENCY: 0
DIARRHEA: 0
NERVOUS/ANXIOUS: 0
ABDOMINAL PAIN: 1
SHORTNESS OF BREATH: 0
JOINT SWELLING: 0
CHILLS: 0
DIZZINESS: 0
HEMATURIA: 0

## 2018-03-13 NOTE — MR AVS SNAPSHOT
After Visit Summary   3/13/2018    Bel Cruz    MRN: [id number]           Patient Information     Date Of Birth          1960        Visit Information        Provider Department      3/13/2018 3:00 PM Lala Falcon MD [facility]        Today's Diagnoses     Encounter for routine adult health examination without abnormal findings    -  1    Hypothyroidism, unspecified type        Atrophic vaginitis          Care Instructions      Preventive Health Recommendations  Female Ages 50 - 64    Yearly exam: See your health care provider every year in order to  o Review health changes.   o Discuss preventive care.    o Review your medicines if your doctor has prescribed any.      Get a Pap test every three years (unless you have an abnormal result and your provider advises testing more often).    If you get Pap tests with HPV test, you only need to test every 5 years, unless you have an abnormal result.     You do not need a Pap test if your uterus was removed (hysterectomy) and you have not had cancer.    You should be tested each year for STDs (sexually transmitted diseases) if you're at risk.     Have a mammogram every 1 to 2 years.    Have a colonoscopy at age 50, or have a yearly FIT test (stool test). These exams screen for colon cancer.      Have a cholesterol test every 5 years, or more often if advised.    Have a diabetes test (fasting glucose) every three years. If you are at risk for diabetes, you should have this test more often.     If you are at risk for osteoporosis (brittle bone disease), think about having a bone density scan (DEXA).    Shots: Get a flu shot each year. Get a tetanus shot every 10 years.    Nutrition:     Eat at least 5 servings of fruits and vegetables each day.    Eat whole-grain bread, whole-wheat pasta and brown rice instead of white grains and rice.    Talk to your provider about Calcium and Vitamin D.     Lifestyle    Exercise at least 150  "minutes a week (30 minutes a day, 5 days a week). This will help you control your weight and prevent disease.    Limit alcohol to one drink per day.    No smoking.     Wear sunscreen to prevent skin cancer.     See your dentist every six months for an exam and cleaning.    See your eye doctor every 1 to 2 years.            Follow-ups after your visit        Follow-up notes from your care team     Return in about 1 year (around 3/13/2019) for Physical Exam.      Who to contact     If you have questions or need follow up information about today's clinic visit or your schedule please contact [facility] directly at [phone].  Normal or non-critical lab and imaging results will be communicated to you by MyChart, letter or phone within 4 business days after the clinic has received the results. If you do not hear from us within 7 days, please contact the clinic through [employer] or phone. If you have a critical or abnormal lab result, we will notify you by phone as soon as possible.  Submit refill requests through [employer] or call your pharmacy and they will forward the refill request to us. Please allow 3 business days for your refill to be completed.          Additional Information About Your Visit        [employer] Information     [employer] gives you secure access to your electronic health record. If you see a primary care provider, you can also send messages to your care team and make appointments. If you have questions, please call your primary care clinic.  If you do not have a primary care provider, please call [phone] and they will assist you.        Care EveryWhere ID     This is your Care EveryWhere ID. This could be used by other organizations to access your [facility] medical records  [id number]        Your Vitals Were     Pulse Temperature Respirations Height Last Period Pulse Oximetry    68 98  F (36.7  C) (Temporal) 16 5' 8.7\" (1.745 m) 07/18/2005 99%    BMI (Body Mass Index)                "    24.73 kg/m2            Blood Pressure from Last 3 Encounters:   03/13/18 104/68   05/08/17 (!) 89/58   04/21/17 120/64    Weight from Last 3 Encounters:   03/13/18 166 lb (75.3 kg)   04/21/17 165 lb (74.8 kg)   01/17/17 169 lb 12.8 oz (77 kg)              We Performed the Following     TSH WITH FREE T4 REFLEX          Today's Medication Changes          These changes are accurate as of 3/13/18  3:41 PM.  If you have any questions, ask your nurse or doctor.               Start taking these medicines.        Dose/Directions    conjugated estrogens cream   Commonly known as:  PREMARIN   Used for:  Atrophic vaginitis   Started by:  Lala Falcon MD        Dose:  0.5 g   Start taking on:  3/15/2018   Place 0.5 g vaginally twice a week   Quantity:  30 g   Refills:  3         These medicines have changed or have updated prescriptions.        Dose/Directions    levothyroxine 112 MCG tablet   Commonly known as:  SYNTHROID/LEVOTHROID   This may have changed:  See the new instructions.   Used for:  Hypothyroidism, unspecified type   Changed by:  Lala Falcon MD        TAKE 1 TABLET(112 MCG) BY MOUTH DAILY   Quantity:  90 tablet   Refills:  3            Where to get your medicines      These medications were sent to [facility] [address] [place] [address] [facility] AT [facility] [address]  [address] [facility], [facility] [address]     Phone:  [phone]     conjugated estrogens cream    levothyroxine 112 MCG tablet                Primary Care Provider Office Phone # Fax #    Lala Falcon [phone]       [address] [place] [address] [address]  [facility] [address]        Equal Access to Services     JOHANN MICHAELS AH: Haddeidre White, mark de jesus, qaybta kaalmada carol, peggy bates. So [facility] [phone].    ATENCIÓN: Si habla español, tiene a sahni disposición servicios gratuitos de asistencia lingüística. Llame al [phone].    We  comply with applicable federal civil rights laws and [place] laws. We do not discriminate on the basis of race, color, national origin, age, disability, sex, sexual orientation, or gender identity.            Thank you!     Thank you for choosing [facility]  for your care. Our goal is always to provide you with excellent care. Hearing back from our patients is one way we can continue to improve our services. Please take a few minutes to complete the written survey that you may receive in the mail after your visit with us. Thank you!             Your Updated Medication List - Protect others around you: Learn how to safely use, store and throw away your medicines at www.disposemymeds.org.          This list is accurate as of 3/13/18  3:41 PM.  Always use your most recent med list.                   Brand Name Dispense Instructions for use Diagnosis    conjugated estrogens cream   Start taking on:  3/15/2018    PREMARIN    30 g    Place 0.5 g vaginally twice a week    Atrophic vaginitis       IBUPROFEN PO      Take 600 mg by mouth every 6 hours as needed for moderate pain        levothyroxine 112 MCG tablet    SYNTHROID/LEVOTHROID    90 tablet    TAKE 1 TABLET(112 MCG) BY MOUTH DAILY    Hypothyroidism, unspecified type       OMEGA-3 FISH OIL PO      Take by mouth daily        WOMENS MULTIVITAMIN PLUS PO      Take 1 tablet by mouth daily. Over 50

## 2018-03-13 NOTE — NURSING NOTE
"Chief Complaint   Patient presents with     Physical     Panel Management     honoring choices, tsh        Initial /68 (BP Location: Left arm, Patient Position: Chair, Cuff Size: Adult Regular)  Pulse 68  Temp 98  F (36.7  C) (Temporal)  Resp 16  Ht 5' 8.7\" (1.745 m)  Wt 166 lb (75.3 kg)  LMP 07/18/2005  SpO2 99%  BMI 24.73 kg/m2 Estimated body mass index is 24.73 kg/(m^2) as calculated from the following:    Height as of this encounter: 5' 8.7\" (1.745 m).    Weight as of this encounter: 166 lb (75.3 kg).  Medication Reconciliation: complete   Latrice Garcia, CMA      "

## 2018-06-13 ENCOUNTER — TELEPHONE (OUTPATIENT)
Dept: FAMILY MEDICINE | Facility: OTHER | Age: 58
End: 2018-06-13

## 2018-06-13 NOTE — TELEPHONE ENCOUNTER
Reason for call:  Symptom  Reason for call:  Patient reporting a symptom    Symptom or request: extremely weak    Duration (how long have symptoms been present): 1 day    Have you been treated for this before? No    Additional comments: pt had dental procedures yesterday and was told by her dentist to call her primary doctor. She had an abscess removed, and an infection was treated.    Phone Number patient can be reached at:  Home number on file [phone] (home)    Best Time:  asap    Can we leave a detailed message on this number:  YES    Call taken on 6/13/2018 at 8:49 AM by Yun Madsen

## 2018-06-14 ENCOUNTER — TELEPHONE (OUTPATIENT)
Dept: FAMILY MEDICINE | Facility: OTHER | Age: 58
End: 2018-06-14

## 2018-06-14 ENCOUNTER — OFFICE VISIT (OUTPATIENT)
Dept: FAMILY MEDICINE | Facility: CLINIC | Age: 58
End: 2018-06-14
Payer: COMMERCIAL

## 2018-06-14 VITALS
HEART RATE: 75 BPM | BODY MASS INDEX: 25.77 KG/M2 | SYSTOLIC BLOOD PRESSURE: 135 MMHG | DIASTOLIC BLOOD PRESSURE: 89 MMHG | RESPIRATION RATE: 16 BRPM | WEIGHT: 173 LBS | OXYGEN SATURATION: 97 % | TEMPERATURE: 99 F

## 2018-06-14 DIAGNOSIS — R11.0 NAUSEA: ICD-10-CM

## 2018-06-14 DIAGNOSIS — R42 LIGHTHEADEDNESS: ICD-10-CM

## 2018-06-14 DIAGNOSIS — K04.7 ABSCESSED TOOTH: Primary | ICD-10-CM

## 2018-06-14 LAB
BASOPHILS # BLD AUTO: 0 10E9/L (ref 0–0.2)
BASOPHILS NFR BLD AUTO: 0 %
DIFFERENTIAL METHOD BLD: NORMAL
EOSINOPHIL # BLD AUTO: 0 10E9/L (ref 0–0.7)
EOSINOPHIL NFR BLD AUTO: 0 %
ERYTHROCYTE [DISTWIDTH] IN BLOOD BY AUTOMATED COUNT: 12.7 % (ref 10–15)
HCT VFR BLD AUTO: 42.6 % (ref 35–47)
HGB BLD-MCNC: 14.3 G/DL (ref 11.7–15.7)
LYMPHOCYTES # BLD AUTO: 0.8 10E9/L (ref 0.8–5.3)
LYMPHOCYTES NFR BLD AUTO: 13.8 %
MCH RBC QN AUTO: 29.6 PG (ref 26.5–33)
MCHC RBC AUTO-ENTMCNC: 33.6 G/DL (ref 31.5–36.5)
MCV RBC AUTO: 88 FL (ref 78–100)
MONOCYTES # BLD AUTO: 0.3 10E9/L (ref 0–1.3)
MONOCYTES NFR BLD AUTO: 5 %
NEUTROPHILS # BLD AUTO: 4.4 10E9/L (ref 1.6–8.3)
NEUTROPHILS NFR BLD AUTO: 81.2 %
PLATELET # BLD AUTO: 215 10E9/L (ref 150–450)
RBC # BLD AUTO: 4.83 10E12/L (ref 3.8–5.2)
WBC # BLD AUTO: 5.4 10E9/L (ref 4–11)

## 2018-06-14 PROCEDURE — 99214 OFFICE O/P EST MOD 30 MIN: CPT | Performed by: INTERNAL MEDICINE

## 2018-06-14 PROCEDURE — 85025 COMPLETE CBC W/AUTO DIFF WBC: CPT | Performed by: INTERNAL MEDICINE

## 2018-06-14 PROCEDURE — 36415 COLL VENOUS BLD VENIPUNCTURE: CPT | Performed by: INTERNAL MEDICINE

## 2018-06-14 RX ORDER — TRAMADOL HYDROCHLORIDE 50 MG/1
TABLET ORAL
Refills: 0 | COMMUNITY
Start: 2018-06-08 | End: 2019-05-03

## 2018-06-14 RX ORDER — ONDANSETRON 4 MG/1
4 TABLET, FILM COATED ORAL EVERY 6 HOURS PRN
Qty: 18 TABLET | Refills: 0 | Status: SHIPPED | OUTPATIENT
Start: 2018-06-14 | End: 2019-05-03

## 2018-06-14 RX ORDER — CHLORHEXIDINE GLUCONATE ORAL RINSE 1.2 MG/ML
SOLUTION DENTAL
Refills: 0 | COMMUNITY
Start: 2018-06-12 | End: 2019-05-03

## 2018-06-14 RX ORDER — CLINDAMYCIN HCL 300 MG
CAPSULE ORAL
Refills: 0 | COMMUNITY
Start: 2018-06-11 | End: 2019-05-03

## 2018-06-14 RX ORDER — HYDROCODONE BITARTRATE AND ACETAMINOPHEN 5; 325 MG/1; MG/1
TABLET ORAL
Refills: 0 | COMMUNITY
Start: 2018-06-11 | End: 2019-05-03

## 2018-06-14 ASSESSMENT — PAIN SCALES - GENERAL: PAINLEVEL: MILD PAIN (3)

## 2018-06-14 NOTE — TELEPHONE ENCOUNTER
Weakness and nausea and back.     Bel rCuz is a 57 year old female who calls with tooth infection and weakness.    NURSING ASSESSMENT:  Description:  Spoke with patient again today,  Feels that she improved a little yesterday but now worse again this am. Feels weaker and tired.  Went to have root canal done and she let them know what is going on and they suggested she be seen, hence the return call  Onset/duration:  About 4 days now  Precip. factors:  Dental work    Allergies:   Allergies   Allergen Reactions     Amoxicillin      tightness in throat     Cephalosporins      ceftin     Clindamycin Base      rash     Erythromycin      Welts     Macrolides      Sulfa Drugs      bactrim       MEDICATIONS:   Taking medication(s) as prescribed? Yes  Taking over the counter medication(s?) Yes  Any medication side effects? No significant side effects    Any barriers to taking medication(s) as prescribed?  No  Medication(s) improving/managing symptoms?  Yes  Medication reconciliation completed: Yes      NURSING PLAN: Nursing advice to patient unable to work her in ER, Z[facility] or RG.  patient is currently in [facility],  appointment made for AN    RECOMMENDED DISPOSITION:  See in 24 hours -   Will comply with recommendation: Yes  If further questions/concerns or if symptoms do not improve, worsen or new symptoms develop, call your PCP or [facility] as soon as possible.      Guideline used:  Telephone Triage Protocols for Nurses, Fifth Edition, Sindi Galo, RN, BSN

## 2018-06-14 NOTE — PROGRESS NOTES
SUBJECTIVE:  Bel Cruz is an 57 year old female who presents for not feeling well.  Had a crown put on tooth 5/29 then got a toothache a week and a half later (a week ago).   Three days ago side of face swelled up and was put on clindamycin and pain medication.  Was not feeling well three days ago and started on clindamycin and had a drain put in two days ago.  Has been feeling weak and lightheaded and having nausea.  Weak, lightheaded feeling seems to accompany the nausea.  Sometimes feels a little better.  Went in to see dentist for procedure today but they didn't even want to lie her back given how she's feeling.  The swelling has improve and the pain is getting too, but nausea and weakness have continued.  No rash with the clindamycin.  No diarrhea.  No recent travel.  In past she thinks she had a rash from clindamycin, but has not had a rash this time.  No vomiting.       has a past medical history of Allergic rhinitis due to other allergen; Esophageal reflux; GENERALIZED ANXIETY DIS (6/27/2007); Panic disorder without agoraphobia (1990); and Unspecified hypothyroidism.  Social History     Social History     Marital status:      Spouse name: Barrera     Number of children: 2     Years of education: 15     Occupational History      [employer]     Social History Main Topics     Smoking status: Never Smoker     Smokeless tobacco: Never Used      Comment: no smokers in household     Alcohol use No     Drug use: No     Sexual activity: Yes     Partners: Male     Birth control/ protection: Surgical      Comment: vasectomy     Other Topics Concern      Service No     Blood Transfusions No     Caffeine Concern No      no pop, 1 cup coffee every am     Occupational Exposure Yes     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     Hobby Hazards No     Sleep Concern No     Stress Concern No     Weight Concern No     Special Diet No     Back Care Yes     Exercise Yes     3-4 times a week     Bike  Helmet Yes     biking      Seat Belt Yes     Self-Exams Yes     knows BSE, periodically, mammogram done 9/2/09     Social History Narrative     Family History   Problem Relation Age of Onset     Thyroid Disease Father      Hashimotos       ALLERGIES:  Amoxicillin; Cephalosporins; Clindamycin base; Erythromycin; Macrolides; and Sulfa drugs    Current Outpatient Prescriptions   Medication     chlorhexidine (PERIDEX) 0.12 % solution     clindamycin (CLEOCIN) 300 MG capsule     IBUPROFEN PO     levothyroxine (SYNTHROID/LEVOTHROID) 112 MCG tablet     Multiple Vitamins-Minerals (WOMENS MULTIVITAMIN PLUS PO)     Omega-3 Fatty Acids (OMEGA-3 FISH OIL PO)         HYDROcodone-acetaminophen (NORCO) 5-325 MG per tablet     traMADol (ULTRAM) 50 MG tablet     No current facility-administered medications for this visit.          ROS:  ROS is done and is negative for general/constitutional, eye, ENT, Respiratory, cardiovascular, GI, , Skin, musculoskeletal except as noted elsewhere.  All other review of systems negative except as noted elsewhere.      OBJECTIVE:  /89  Pulse 75  Temp 99  F (37.2  C) (Tympanic)  Resp 16  Wt 173 lb (78.5 kg)  LMP 07/18/2005  SpO2 97%  Breastfeeding? No  BMI 25.77 kg/m2  GENERAL APPEARANCE: Alert, in no acute distress  EYES: normal, EOM's intact and PERRLA  EARS: negative  NOSE:normal  OROPHARYNX:normal except drain by one upper tooth  NECK:No adenopathy,masses or thyromegaly  RESP: normal and clear to auscultation  CV:regular rate and rhythm and no murmurs, clicks, or gallops  ABDOMEN: Abdomen soft, non-tender. BS normal. No masses, organomegaly  SKIN: no ulcers, lesions or rash  MUSCULOSKELETAL:Musculoskeletal normal  NEURO: CN 2-12 grossly intact.  Strength 5/5 and symmetric in bilateral upper and lower extremities.  DTRs 2+ and symmetric in bilateral upper and lower extremities.  Sensation to light touch grossly intact in bilateral upper and lower extremities.    RESULTS  Results  for orders placed or performed in visit on 06/14/18   CBC with platelets differential   Result Value Ref Range    WBC 5.4 4.0 - 11.0 10e9/L    RBC Count 4.83 3.8 - 5.2 10e12/L    Hemoglobin 14.3 11.7 - 15.7 g/dL    Hematocrit 42.6 35.0 - 47.0 %    MCV 88 78 - 100 fl    MCH 29.6 26.5 - 33.0 pg    MCHC 33.6 31.5 - 36.5 g/dL    RDW 12.7 10.0 - 15.0 %    Platelet Count 215 150 - 450 10e9/L    Diff Method Automated Method     % Neutrophils 81.2 %    % Lymphocytes 13.8 %    % Monocytes 5.0 %    % Eosinophils 0.0 %    % Basophils 0.0 %    Absolute Neutrophil 4.4 1.6 - 8.3 10e9/L    Absolute Lymphocytes 0.8 0.8 - 5.3 10e9/L    Absolute Monocytes 0.3 0.0 - 1.3 10e9/L    Absolute Eosinophils 0.0 0.0 - 0.7 10e9/L    Absolute Basophils 0.0 0.0 - 0.2 10e9/L   .  No results found for this or any previous visit (from the past 48 hour(s)).    ASSESSMENT/PLAN:    ASSESSMENT / PLAN:  (K04.7) Abscessed tooth  (primary encounter diagnosis)  Comment: currently on clindamycin.  She has multiple abx allergies making options limited.  She is not having a rash with clinda, but suspect her nausea and other sxs are a side effect of the medication.  Currently no signs of sepsis or other more serious infection.  Plan: CBC with platelets differential        Will have her continue on the clindamycin as it has helped her abscess sxs, and her choices of abx are quite limited.  Could consider combination of cipro and flagyl if sxs worsen.  Will rx zofran to attempt to manage some of the nausea.  She is to keep appt tomorrow to have root canal done and if dentist says it looks good enough to stop the abx, she may stop it.    (R11.0) Nausea  Comment: no vomiting.  Suspect is a side effect of clinda as noted above.  Given the need for abx and her limited options, will continue clinda for now as noted above.  Plan: ondansetron (ZOFRAN) 4 MG tablet        Reviewed medication instructions and side effects. Follow up if experiences side effects. I reviewed  supportive care, expected course, and signs of concern.  Follow up as needed or if she does not improve within 3 day(s) or if worsens in any way.  Reviewed red flag symptoms and is to go to the ER if experiences any of these.    (R42) Lightheadedness  Comment: suspect is a side effect of the clinda with the nause  Plan: zofran as above.  abx plan as above. I reviewed supportive care, expected course, and signs of concern.  Follow up as needed or if she does not improve within 3 day(s) or if worsens in any way.  Reviewed red flag symptoms and is to go to the ER if experiences any of these.      See [facility] for orders, medications, letters, patient instructions    Yolanda Matias M.D.

## 2018-06-14 NOTE — MR AVS SNAPSHOT
After Visit Summary   6/14/2018    Bel Cruz    MRN: [id number]           Patient Information     Date Of Birth          1960        Visit Information        Provider Department      6/14/2018 12:20 PM Yolanda Matias MD [facility]        Today's Diagnoses     Abscessed tooth    -  1    Nausea        Lightheadedness          Care Instructions    Keep your dentist appointment tomorrow.          Follow-ups after your visit        Follow-up notes from your care team     Return in about 3 days (around 6/17/2018), or if symptoms worsen or fail to improve, for follow up with primary doctor.      Who to contact     If you have questions or need follow up information about today's clinic visit or your schedule please contact [facility] directly at [phone].  Normal or non-critical lab and imaging results will be communicated to you by MyChart, letter or phone within 4 business days after the clinic has received the results. If you do not hear from us within 7 days, please contact the clinic through MyChart or phone. If you have a critical or abnormal lab result, we will notify you by phone as soon as possible.  Submit refill requests through [facility] or call your pharmacy and they will forward the refill request to us. Please allow 3 business days for your refill to be completed.          Additional Information About Your Visit        MyChart Information     [facility] gives you secure access to your electronic health record. If you see a primary care provider, you can also send messages to your care team and make appointments. If you have questions, please call your primary care clinic.  If you do not have a primary care provider, please call [phone] and they will assist you.        Care EveryWhere ID     This is your Care EveryWhere ID. This could be used by other organizations to access your [facility] medical records  [id number]        Your Vitals Were     Pulse  Temperature Respirations Last Period Pulse Oximetry Breastfeeding?    75 99  F (37.2  C) (Tympanic) 16 07/18/2005 97% No    BMI (Body Mass Index)                   25.77 kg/m2            Blood Pressure from Last 3 Encounters:   06/14/18 135/89   03/13/18 104/68   05/08/17 (!) 89/58    Weight from Last 3 Encounters:   06/14/18 173 lb (78.5 kg)   03/13/18 166 lb (75.3 kg)   04/21/17 165 lb (74.8 kg)              We Performed the Following     CBC with platelets differential          Today's Medication Changes          These changes are accurate as of 6/14/18 12:34 PM.  If you have any questions, ask your nurse or doctor.               Start taking these medicines.        Dose/Directions    ondansetron 4 MG tablet   Commonly known as:  ZOFRAN   Used for:  Nausea   Started by:  Yolanda Matias MD        Dose:  4 mg   Take 1 tablet (4 mg) by mouth every 6 hours as needed for nausea   Quantity:  18 tablet   Refills:  0            Where to get your medicines      These medications were sent to [facility] [address] [place] [id number] [address]  AT [facility] [address]  [id number] [facility] [address]     Phone:  [phone]     ondansetron 4 MG tablet               Information about OPIOIDS     PRESCRIPTION OPIOIDS: WHAT YOU NEED TO KNOW   We gave you an opioid (narcotic) pain medicine. It is important to manage your pain, but opioids are not always the best choice. You should first try all the other options your care team gave you. Take this medicine for as short a time (and as few doses) as possible.     These medicines have risks:    DO NOT drive when on new or higher doses of pain medicine. These medicines can affect your alertness and reaction times, and you could be arrested for driving under the influence (DUI). If you need to use opioids long-term, talk to your care team about driving.    DO NOT operate heave machinery    DO NOT do any other dangerous activities while taking these  medicines.     DO NOT drink any alcohol while taking these medicines.      If the opioid prescribed includes acetaminophen, DO NOT take with any other medicines that contain acetaminophen. Read all labels carefully. Look for the word  acetaminophen  or  Tylenol.  Ask your pharmacist if you have questions or are unsure.    You can get addicted to pain medicines, especially if you have a history of addiction (chemical, alcohol or substance dependence). Talk to your care team about ways to reduce this risk.    Store your pills in a secure place, locked if possible. We will not replace any lost or stolen medicine. If you don t finish your medicine, please throw away (dispose) as directed by your pharmacist. The [place] Pollution Control Agency has more information about safe disposal: https://www.pca.[facility].mn.us/living-green/managing-unwanted-medications.     All opioids tend to cause constipation. Drink plenty of water and eat foods that have a lot of fiber, such as fruits, vegetables, prune juice, apple juice and high-fiber cereal. Take a laxative (Miralax, milk of magnesia, Colace, Senna) if you don t move your bowels at least every other day.          Primary Care Provider Office Phone # Fax #    Lala Falcon [phone]       [address] [place] [address]        Equal Access to Services     TIARA MICHAELS AH: Hadii aad ku hadasho Soomaali, waaxda luqadaha, qaybta kaalmada adeegyada, peggy bates. So [facility] [phone].    ATENCIÓN: Si habla español, tiene a sahni disposición servicios gratuitos de asistencia lingüística. Llame al [phone].    We comply with applicable federal civil rights laws and [place] laws. We do not discriminate on the basis of race, color, national origin, age, disability, sex, sexual orientation, or gender identity.            Thank you!     Thank you for choosing [facility]  [facility]. Our goal is always  to provide you with excellent care. Hearing back from our patients is one way we can continue to improve our services. Please take a few minutes to complete the written survey that you may receive in the mail after your visit with us. Thank you!             Your Updated Medication List - Protect others around you: Learn how to safely use, store and throw away your medicines at www.disposemymeds.org.          This list is accurate as of 6/14/18 12:34 PM.  Always use your most recent med list.                   Brand Name Dispense Instructions for use Diagnosis    chlorhexidine 0.12 % solution    PERIDEX     HOLD/ SWISH 10 ML AND GENTLY SPIT BID FOR 7 DAYS        clindamycin 300 MG capsule    CLEOCIN     TK ONE C PO  QID        HYDROcodone-acetaminophen 5-325 MG per tablet    NORCO          IBUPROFEN PO      Take 600 mg by mouth every 6 hours as needed for moderate pain        levothyroxine 112 MCG tablet    SYNTHROID/LEVOTHROID    90 tablet    TAKE 1 TABLET(112 MCG) BY MOUTH DAILY    Hypothyroidism, unspecified type       OMEGA-3 FISH OIL PO      Take by mouth daily        ondansetron 4 MG tablet    ZOFRAN    18 tablet    Take 1 tablet (4 mg) by mouth every 6 hours as needed for nausea    Nausea       traMADol 50 MG tablet    ULTRAM     TK 1 TO 2 TS PO Q 4 TO 6 H PRN        WOMENS MULTIVITAMIN PLUS PO      Take 1 tablet by mouth daily. Over 50

## 2018-06-14 NOTE — NURSING NOTE
"Chief Complaint   Patient presents with     Dental Problem     pt was seen for dental abcess and now having  nausea and feeling weak, concerned it maybe due to antibiotic       Initial /89  Pulse 75  Temp 99  F (37.2  C) (Tympanic)  Resp 16  Wt 173 lb (78.5 kg)  LMP 07/18/2005  SpO2 97%  Breastfeeding? No  BMI 25.77 kg/m2 Estimated body mass index is 25.77 kg/(m^2) as calculated from the following:    Height as of 3/13/18: 5' 8.7\" (1.745 m).    Weight as of this encounter: 173 lb (78.5 kg).  Medication Reconciliation: complete  Sathya Donahue MA    "

## 2018-07-11 ENCOUNTER — MYC MEDICAL ADVICE (OUTPATIENT)
Dept: FAMILY MEDICINE | Facility: OTHER | Age: 58
End: 2018-07-11

## 2018-07-11 NOTE — TELEPHONE ENCOUNTER
I do not see that patient has been prescribed Azithromycin in the past. RN please review- and route to provider if not appropriate.     Ro Willis MA

## 2019-04-11 ENCOUNTER — TELEPHONE (OUTPATIENT)
Dept: FAMILY MEDICINE | Facility: OTHER | Age: 59
End: 2019-04-11

## 2019-04-11 DIAGNOSIS — E03.9 HYPOTHYROIDISM, UNSPECIFIED TYPE: ICD-10-CM

## 2019-04-11 RX ORDER — LEVOTHYROXINE SODIUM 112 UG/1
TABLET ORAL
Qty: 30 TABLET | Refills: 1 | Status: SHIPPED | OUTPATIENT
Start: 2019-04-11 | End: 2019-05-03

## 2019-04-11 NOTE — TELEPHONE ENCOUNTER
Levothyroxine    Medication is being filled for 1 time refill only due to:  Patient needs to be seen because it has been more than one year since last visit.     Please help schedule OV, last physical 3/13/2018    Kathleen Restrepo, RN, BSN

## 2019-04-24 NOTE — PROGRESS NOTES
SUBJECTIVE:   CC: Bel Cruz is an 58 year old woman who presents for preventive health visit.     Healthy Habits:     Getting at least 3 servings of Calcium per day:  Yes    Bi-annual eye exam:  Yes    Dental care twice a year:  Yes    Sleep apnea or symptoms of sleep apnea:  Daytime drowsiness    Diet:  Regular (no restrictions)    Frequency of exercise:  2-3 days/week    Duration of exercise:  30-45 minutes    Taking medications regularly:  No    Barriers to taking medications:  Not applicable    Medication side effects:  None    PHQ-2 Total Score: 0    Additional concerns today:  Yes      Musculoskeletal problem/pain      Duration: Right Hip Pain    Description  Location: Right hip radiates down thigh and calf -  Getting more severe    Intensity:  moderate, severe, 4-6/10    Accompanying signs and symptoms: tingling in Right foot    History  Previous similar problem: YES  Previous evaluation:  none    Precipitating or alleviating factors:  Trauma or overuse: no   Aggravating factors include: walking, climbing stairs and while driving    Therapies tried and outcome: heat, stretching, Ibuprofen and physical therapy and massage      Today's PHQ-2 Score:   PHQ-2 ( 1999 Pfizer) 5/3/2019   Q1: Little interest or pleasure in doing things 0   Q2: Feeling down, depressed or hopeless 0   PHQ-2 Score 0   Q1: Little interest or pleasure in doing things Not at all   Q2: Feeling down, depressed or hopeless Not at all   PHQ-2 Score 0       Abuse: Current or Past(Physical, Sexual or Emotional)- No  Do you feel safe in your environment? Yes    Social History     Tobacco Use     Smoking status: Never Smoker     Smokeless tobacco: Never Used     Tobacco comment: no smokers in household   Substance Use Topics     Alcohol use: No         Alcohol Use 5/3/2019   Prescreen: >3 drinks/day or >7 drinks/week? Not Applicable   Prescreen: >3 drinks/day or >7 drinks/week? -       Reviewed orders with patient.  Reviewed health  "maintenance and updated orders accordingly - Yes    Mammogram Screening: Patient over age 50, mutual decision to screen reflected in health maintenance.    Pertinent mammograms are reviewed under the imaging tab.  History of abnormal Pap smear: NO - age 30-65 PAP every 5 years with negative HPV co-testing recommended  PAP / HPV 10/17/2014 8/31/2011 9/2/2009   PAP NIL NIL NIL     Reviewed and updated as needed this visit by clinical staff  Tobacco  Allergies  Meds  Problems  Med Hx  Surg Hx  Fam Hx         Reviewed and updated as needed this visit by Provider  Tobacco  Allergies  Meds  Problems  Med Hx  Surg Hx  Fam Hx          Review of Systems   Constitutional: Negative for chills and fever.   HENT: Negative for congestion, ear pain, hearing loss and sore throat.    Eyes: Negative for pain and visual disturbance.   Respiratory: Negative for cough and shortness of breath.    Cardiovascular: Negative for chest pain, palpitations and peripheral edema.   Gastrointestinal: Negative for abdominal pain, constipation, diarrhea, heartburn and hematochezia.   Breasts:  Negative for tenderness, breast mass and discharge.   Genitourinary: Negative for dysuria, frequency, genital sores, hematuria, pelvic pain, urgency, vaginal bleeding and vaginal discharge.   Musculoskeletal: Positive for arthralgias and myalgias. Negative for joint swelling.   Skin: Negative for rash.   Neurological: Positive for dizziness. Negative for weakness, headaches and paresthesias.   Psychiatric/Behavioral: Negative for mood changes. The patient is not nervous/anxious.         OBJECTIVE:   /82   Pulse 80   Temp 98.6  F (37  C)   Resp 16   Ht 1.74 m (5' 8.5\")   Wt 79.8 kg (176 lb)   LMP 07/18/2005   SpO2 99%   BMI 26.37 kg/m    Physical Exam   Constitutional: She is oriented to person, place, and time. She appears well-developed and well-nourished. No distress.   HENT:   Right Ear: Tympanic membrane and external ear normal. "   Left Ear: Tympanic membrane and external ear normal.   Nose: Nose normal.   Mouth/Throat: Oropharynx is clear and moist. No oropharyngeal exudate.   Eyes: Pupils are equal, round, and reactive to light. Conjunctivae are normal. Right eye exhibits no discharge. Left eye exhibits no discharge.   Neck: Neck supple. No tracheal deviation present. No thyromegaly present.   Cardiovascular: Normal rate, regular rhythm, S1 normal, S2 normal, normal heart sounds and normal pulses. Exam reveals no S3, no S4 and no friction rub.   No murmur heard.  Pulmonary/Chest: Effort normal and breath sounds normal. No respiratory distress. She has no wheezes. She has no rales. Right breast exhibits no mass, no nipple discharge and no tenderness. Left breast exhibits no mass, no nipple discharge and no tenderness.   Breast exam:  No masses palpable bilaterally.  No skin changes, tethering or axillary lymphadenopathy bilaterally.     Abdominal: Soft. Bowel sounds are normal. She exhibits no mass. There is no hepatosplenomegaly. There is no tenderness.   Genitourinary: Cervix exhibits no motion tenderness and no discharge.   Musculoskeletal: Normal range of motion. She exhibits no edema.   Lymphadenopathy:     She has no cervical adenopathy.   Neurological: She is alert and oriented to person, place, and time. She has normal strength and normal reflexes. She exhibits normal muscle tone.   Skin: Skin is warm and dry. No rash noted.   Psychiatric: She has a normal mood and affect. Judgment and thought content normal. Cognition and memory are normal.     ASSESSMENT/PLAN:   1. Encounter for routine adult health examination without abnormal findings    2. Screening for malignant neoplasm of cervix  - Pap imaged thin layer screen with HPV - recommended age 30 - 65 years (select HPV order below)  - HPV High Risk Types DNA Cervical    3. Visit for screening mammogram    - MA SCREENING DIGITAL BILAT - Future  (s+30); Future    4. Hypothyroidism,  "unspecified type  She tells me she has been out of her medication for the last week.  Therefore if her TSH is out of range would prefer to have her take her medication continuously for a couple of months and then recheck it.    - TSH WITH FREE T4 REFLEX  - levothyroxine (SYNTHROID/LEVOTHROID) 112 MCG tablet; TAKE 1 TABLET(112 MCG) BY MOUTH DAILY  Dispense: 90 tablet; Refill: 3  - **TSH with free T4 reflex FUTURE 2mo; Future    5. Lumbar radiculopathy  She has been complaining of pain that starts in her right buttock and wraps around her leg to below her knee.  This is what she considers her hip pain.  She denies have any pain with hip rotation.  Denies weakness, numbness or tingling.  However she feels that this is been worsening.  She had an MRI done a couple of years ago with some mild degenerative changes.  She had seen sports medicine and had physical therapy done and she states somebody told her she had a labrum tear in her hip although she has not had any imaging.  Her exam reveals full range of motion of her hip.  There is no tenderness over the trochanteric area.  Strength and sensation are intact.  As this is been progressing and did not improve with physical therapy I would prefer to check an MRI of her lumbar spine as it appears to be more back related.  However the MRI does not reveal any significant abnormality then would consider orthopedics consult to discuss possible hip issue.  - MR Lumbar Spine w/o Contrast; Future    6. Atrophic vaginitis  - estradiol (ESTRACE) 0.1 MG/GM vaginal cream; Place 2 g vaginally twice a week  Dispense: 42.5 g; Refill: 1    COUNSELING:  Reviewed preventive health counseling, as reflected in patient instructions    BP Readings from Last 1 Encounters:   05/03/19 120/82     Estimated body mass index is 26.37 kg/m  as calculated from the following:    Height as of this encounter: 1.74 m (5' 8.5\").    Weight as of this encounter: 79.8 kg (176 lb).    Weight management plan: " Discussed healthy diet and exercise guidelines     reports that she has never smoked. She has never used smokeless tobacco.      Counseling Resources:  ATP IV Guidelines  Pooled Cohorts Equation Calculator  Breast Cancer Risk Calculator  FRAX Risk Assessment  ICSI Preventive Guidelines  Dietary Guidelines for Americans, 2010  USDA's MyPlate  ASA Prophylaxis  Lung CA Screening    Lala Falcon MD  [facility]

## 2019-05-03 ENCOUNTER — OFFICE VISIT (OUTPATIENT)
Dept: FAMILY MEDICINE | Facility: OTHER | Age: 59
End: 2019-05-03
Payer: COMMERCIAL

## 2019-05-03 VITALS
TEMPERATURE: 98.6 F | BODY MASS INDEX: 26.07 KG/M2 | WEIGHT: 176 LBS | RESPIRATION RATE: 16 BRPM | HEART RATE: 80 BPM | DIASTOLIC BLOOD PRESSURE: 82 MMHG | OXYGEN SATURATION: 99 % | SYSTOLIC BLOOD PRESSURE: 120 MMHG | HEIGHT: 69 IN

## 2019-05-03 DIAGNOSIS — Z00.00 ENCOUNTER FOR ROUTINE ADULT HEALTH EXAMINATION WITHOUT ABNORMAL FINDINGS: Primary | ICD-10-CM

## 2019-05-03 DIAGNOSIS — Z12.4 SCREENING FOR MALIGNANT NEOPLASM OF CERVIX: ICD-10-CM

## 2019-05-03 DIAGNOSIS — N95.2 ATROPHIC VAGINITIS: ICD-10-CM

## 2019-05-03 DIAGNOSIS — M54.16 LUMBAR RADICULOPATHY: ICD-10-CM

## 2019-05-03 DIAGNOSIS — E03.9 HYPOTHYROIDISM, UNSPECIFIED TYPE: ICD-10-CM

## 2019-05-03 DIAGNOSIS — Z12.31 VISIT FOR SCREENING MAMMOGRAM: ICD-10-CM

## 2019-05-03 LAB
T4 FREE SERPL-MCNC: 0.65 NG/DL (ref 0.76–1.46)
TSH SERPL DL<=0.005 MIU/L-ACNC: 10.34 MU/L (ref 0.4–4)

## 2019-05-03 PROCEDURE — G0123 SCREEN CERV/VAG THIN LAYER: HCPCS | Performed by: FAMILY MEDICINE

## 2019-05-03 PROCEDURE — 36415 COLL VENOUS BLD VENIPUNCTURE: CPT | Performed by: FAMILY MEDICINE

## 2019-05-03 PROCEDURE — 99213 OFFICE O/P EST LOW 20 MIN: CPT | Mod: 25 | Performed by: FAMILY MEDICINE

## 2019-05-03 PROCEDURE — 87624 HPV HI-RISK TYP POOLED RSLT: CPT | Performed by: FAMILY MEDICINE

## 2019-05-03 PROCEDURE — 84443 ASSAY THYROID STIM HORMONE: CPT | Performed by: FAMILY MEDICINE

## 2019-05-03 PROCEDURE — 99396 PREV VISIT EST AGE 40-64: CPT | Mod: 25 | Performed by: FAMILY MEDICINE

## 2019-05-03 PROCEDURE — 84439 ASSAY OF FREE THYROXINE: CPT | Performed by: FAMILY MEDICINE

## 2019-05-03 RX ORDER — LEVOTHYROXINE SODIUM 112 UG/1
TABLET ORAL
Qty: 90 TABLET | Refills: 3 | Status: SHIPPED | OUTPATIENT
Start: 2019-05-03 | End: 2020-04-03

## 2019-05-03 RX ORDER — ESTRADIOL 0.1 MG/G
2 CREAM VAGINAL
Qty: 42.5 G | Refills: 1 | Status: SHIPPED | OUTPATIENT
Start: 2019-05-06 | End: 2020-04-03

## 2019-05-03 ASSESSMENT — ENCOUNTER SYMPTOMS
HEMATURIA: 0
CONSTIPATION: 0
ABDOMINAL PAIN: 0
SORE THROAT: 0
HEMATOCHEZIA: 0
SHORTNESS OF BREATH: 0
PARESTHESIAS: 0
ARTHRALGIAS: 1
WEAKNESS: 0
DIARRHEA: 0
CHILLS: 0
BREAST MASS: 0
COUGH: 0
FREQUENCY: 0
EYE PAIN: 0
NERVOUS/ANXIOUS: 0
DYSURIA: 0
FEVER: 0
PALPITATIONS: 0
HEADACHES: 0
JOINT SWELLING: 0
HEARTBURN: 0
MYALGIAS: 1
DIZZINESS: 1

## 2019-05-03 ASSESSMENT — MIFFLIN-ST. JEOR: SCORE: 1429.77

## 2019-05-03 ASSESSMENT — PAIN SCALES - GENERAL: PAINLEVEL: NO PAIN (0)

## 2019-05-05 ENCOUNTER — MYC MEDICAL ADVICE (OUTPATIENT)
Dept: FAMILY MEDICINE | Facility: OTHER | Age: 59
End: 2019-05-05

## 2019-05-07 LAB
COPATH REPORT: NORMAL
PAP: NORMAL

## 2019-05-08 LAB
FINAL DIAGNOSIS: NORMAL
HPV HR 12 DNA CVX QL NAA+PROBE: NEGATIVE
HPV16 DNA SPEC QL NAA+PROBE: NEGATIVE
HPV18 DNA SPEC QL NAA+PROBE: NEGATIVE
SPECIMEN DESCRIPTION: NORMAL
SPECIMEN SOURCE CVX/VAG CYTO: NORMAL

## 2019-07-11 ENCOUNTER — ANCILLARY PROCEDURE (OUTPATIENT)
Dept: MAMMOGRAPHY | Facility: OTHER | Age: 59
End: 2019-07-11
Attending: FAMILY MEDICINE
Payer: COMMERCIAL

## 2019-07-11 DIAGNOSIS — E03.9 HYPOTHYROIDISM, UNSPECIFIED TYPE: ICD-10-CM

## 2019-07-11 DIAGNOSIS — Z12.31 VISIT FOR SCREENING MAMMOGRAM: ICD-10-CM

## 2019-07-11 LAB — TSH SERPL DL<=0.005 MIU/L-ACNC: 1 MU/L (ref 0.4–4)

## 2019-07-11 PROCEDURE — 77067 SCR MAMMO BI INCL CAD: CPT | Mod: TC

## 2019-07-11 PROCEDURE — 77063 BREAST TOMOSYNTHESIS BI: CPT | Mod: TC

## 2019-07-11 PROCEDURE — 84443 ASSAY THYROID STIM HORMONE: CPT | Performed by: FAMILY MEDICINE

## 2019-07-11 PROCEDURE — 36415 COLL VENOUS BLD VENIPUNCTURE: CPT | Performed by: FAMILY MEDICINE

## 2019-07-23 ENCOUNTER — ANCILLARY PROCEDURE (OUTPATIENT)
Dept: MRI IMAGING | Facility: CLINIC | Age: 59
End: 2019-07-23
Attending: FAMILY MEDICINE
Payer: COMMERCIAL

## 2019-07-23 DIAGNOSIS — M54.16 LUMBAR RADICULOPATHY: ICD-10-CM

## 2019-07-23 PROCEDURE — 72148 MRI LUMBAR SPINE W/O DYE: CPT | Performed by: RADIOLOGY

## 2019-07-26 ENCOUNTER — OFFICE VISIT (OUTPATIENT)
Dept: FAMILY MEDICINE | Facility: OTHER | Age: 59
End: 2019-07-26
Payer: COMMERCIAL

## 2019-07-26 ENCOUNTER — ANCILLARY PROCEDURE (OUTPATIENT)
Dept: GENERAL RADIOLOGY | Facility: OTHER | Age: 59
End: 2019-07-26
Attending: FAMILY MEDICINE
Payer: COMMERCIAL

## 2019-07-26 VITALS
HEART RATE: 77 BPM | SYSTOLIC BLOOD PRESSURE: 110 MMHG | BODY MASS INDEX: 26.07 KG/M2 | OXYGEN SATURATION: 98 % | WEIGHT: 174 LBS | TEMPERATURE: 98.9 F | DIASTOLIC BLOOD PRESSURE: 74 MMHG | RESPIRATION RATE: 14 BRPM

## 2019-07-26 DIAGNOSIS — M25.551 HIP PAIN, RIGHT: ICD-10-CM

## 2019-07-26 DIAGNOSIS — F41.8 SITUATIONAL ANXIETY: ICD-10-CM

## 2019-07-26 DIAGNOSIS — M25.561 RIGHT KNEE PAIN, UNSPECIFIED CHRONICITY: Primary | ICD-10-CM

## 2019-07-26 DIAGNOSIS — M25.561 RIGHT KNEE PAIN, UNSPECIFIED CHRONICITY: ICD-10-CM

## 2019-07-26 PROCEDURE — 73502 X-RAY EXAM HIP UNI 2-3 VIEWS: CPT

## 2019-07-26 PROCEDURE — 73560 X-RAY EXAM OF KNEE 1 OR 2: CPT | Mod: RT

## 2019-07-26 PROCEDURE — 99214 OFFICE O/P EST MOD 30 MIN: CPT | Performed by: FAMILY MEDICINE

## 2019-07-26 ASSESSMENT — PAIN SCALES - GENERAL: PAINLEVEL: SEVERE PAIN (7)

## 2019-07-26 NOTE — PROGRESS NOTES
Subjective     Bel Cruz is a 58 year old female who presents to clinic today for the following health issues:    HPI   Joint Pain    Onset: ongoing for over 3 years but has gotten much worse    Description:   Location: right hip all the way down to knee  Character: Sharp    Intensity: severe    Progression of Symptoms: worse    Accompanying Signs & Symptoms:  Other symptoms: feels unstable     History:   Previous similar pain: YES      Precipitating factors:   Trauma or overuse: no     Alleviating factors:  Improved by: Standing    Therapies Tried and outcome: Ibuprofen every 6 hours, PT     She has had right-sided leg pain for the last 3 years but feels it is gotten much worse over the last month.  She is described her pain as happening in her right hip and radiating down her leg to her mid calf.  An MRI was done a couple of days ago.  This revealed stable findings since December 2016.  She had mild lumbar spondylosis without significant spinal canal stenosis with mild right neural foraminal narrowing at L4-5.    She states that over the last month her pain has really worsened around her right knee.  It starts about midway up her calf and hands above her knee.  At times she feels that her knee is not stable.  Her knee feels better when she has it bent and rotated.  She feels the pain is worse at night.  She denies any known swelling.  She also denies pain with rotation of her right hip.    Patient Active Problem List   Diagnosis     Hypothyroidism     Atrophic vaginitis     Situational anxiety     Past Surgical History:   Procedure Laterality Date     COLONOSCOPY  7/26/2011    Procedure:COMBINED COLONOSCOPY, REMOVE TUMOR/POLYP/LESION BY SNARE; single polyp removal; Surgeon:YUE LIMON; Location:[facility]     COLONOSCOPY Left 6/24/2016    Procedure: COMBINED COLONOSCOPY, SINGLE OR MULTIPLE BIOPSY/POLYPECTOMY BY BIOPSY;  Surgeon: Joseph Keyes MD;  Location: [facility]     COLONOSCOPY WITH CO2  INSUFFLATION N/A 6/24/2016    Procedure: COLONOSCOPY WITH CO2 INSUFFLATION;  Surgeon: Joseph Keyes MD;  Location: [facility]     ESOPHAGOSCOPY, GASTROSCOPY, DUODENOSCOPY (EGD), COMBINED N/A 5/8/2017    Procedure: COMBINED ESOPHAGOSCOPY, GASTROSCOPY, DUODENOSCOPY (EGD), BIOPSY SINGLE OR MULTIPLE;  ESOPHAGOSCOPY, GASTROSCOPY, DUODENOSCOPY (EGD) wiith multiple biopsies;  Surgeon: Chavez Land MD;  Location: [facility]     HC HYSTEROSCOPY DIAGOSTIC (SEPARATE PROC)  8/10/2004    Hysteroscopy, D&C, Endometrial ablation     PELVIS LAPAROSCOPY,DX  1996    Pelvic pain - adhesions and mild endometriosis     US BREAST CLIP PLACEMENT W BIOPSY LEFT         Social History     Tobacco Use     Smoking status: Never Smoker     Smokeless tobacco: Never Used     Tobacco comment: no smokers in household   Substance Use Topics     Alcohol use: No     Family History   Problem Relation Age of Onset     Thyroid Disease Father         Hashimotos           Reviewed and updated as needed this visit by Provider  Meds  Problems         Review of Systems   ROS COMP: CONSTITUTIONAL: NEGATIVE for fever, chills, change in weight  RESP: NEGATIVE for significant cough or shortness of breath  CV: NEGATIVE for chest pain, palpitations or peripheral edema  Psych: She admits to several things happening in her life and admits to being anxious.  She is not looking for medication although she admits she did well on Paxil in the past.  She is hoping to be able to work through this but wanted this in her chart if her symptoms worsened.      Objective    /74 (BP Location: Left arm, Patient Position: Chair, Cuff Size: Adult Regular)   Pulse 77   Temp 98.9  F (37.2  C) (Temporal)   Resp 14   Wt 78.9 kg (174 lb)   LMP 07/18/2005   SpO2 98%   BMI 26.07 kg/m    Body mass index is 26.07 kg/m .  Physical Exam   Gen: no apparent distress  Right hip: Full range of motion, no trochanteric tenderness  Right knee: full range of motion, no  tenderness, masses, effusion or ligamentous instability.     X-ray: Done of right hip and right knee.  No obvious degeneration.  Radiology review pending.        Assessment & Plan     1. Right knee pain, unspecified chronicity  Unclear if she is having any cartilage issue or if this actually is coming from her back.  Will refer to orthopedics.    - XR Knee Standing Right 2 Views; Future  - ORTHO  REFERRAL    2. Hip pain, right  Patient states somebody told her she could possibly have a labrum tear.  Will refer to orthopedics.  Did discuss that this still may be coming from her back.    - XR Hip Right 2-3 Views; Future  - ORTHO  REFERRAL    3. Situational anxiety  She declines medication at this point but will let me know if things worsen.      Lala Falcon MD  [facility]

## 2019-08-01 ENCOUNTER — OFFICE VISIT (OUTPATIENT)
Dept: ORTHOPEDICS | Facility: OTHER | Age: 59
End: 2019-08-01
Payer: COMMERCIAL

## 2019-08-01 VITALS
BODY MASS INDEX: 25.77 KG/M2 | HEART RATE: 63 BPM | DIASTOLIC BLOOD PRESSURE: 88 MMHG | SYSTOLIC BLOOD PRESSURE: 125 MMHG | RESPIRATION RATE: 16 BRPM | WEIGHT: 174 LBS | HEIGHT: 69 IN

## 2019-08-01 DIAGNOSIS — M70.61 TROCHANTERIC BURSITIS OF RIGHT HIP: ICD-10-CM

## 2019-08-01 PROCEDURE — 99203 OFFICE O/P NEW LOW 30 MIN: CPT | Performed by: ORTHOPAEDIC SURGERY

## 2019-08-01 ASSESSMENT — MIFFLIN-ST. JEOR: SCORE: 1420.7

## 2019-08-01 NOTE — PATIENT INSTRUCTIONS
Trochanteric Bursitis           What is trochanteric bursitis?   Trochanteric bursitis is irritation or inflammation of the trochanteric bursa. A bursa is a fluid-filled sac that acts as a cushion between tendons, bones, and skin. The trochanteric bursa is located on the upper, outer area of the thigh. There is a bump on the outer side of the upper part of the thigh bone (femur) called the greater trochanter. The trochanteric bursa is located over the greater trochanter.   How does it occur?   The trochanteric bursa may be inflamed by a group of muscles or tendons rubbing over the bursa and causing friction against the thigh bone. This injury can occur with running, walking, or bicycling, especially when the bicycle seat is too high.   What are the symptoms?   You have pain on the upper outer area of your thigh or in your hip. The pain is worse when you walk, bicycle, or go up or down stairs. You have pain when you move your thigh bone and feel tenderness in the area over the greater trochanter.   How is it diagnosed?   Your healthcare provider will ask about your symptoms and examine your hip and thigh.   How is it treated?   To treat this condition:   Put an ice pack, gel pack, or package of frozen vegetables, wrapped in a cloth on the area every 3 to 4 hours, for up to 20 minutes at a time.   Sleep with a pillow between your legs.  Take an anti-inflammatory medicine such as ibuprofen, or other medicine as directed by your provider. Nonsteroidal anti-inflammatory medicines (NSAIDs) may cause stomach bleeding and other problems. These risks increase with age. Read the label and take as directed. Unless recommended by your healthcare provider, do not take for more than 10 days.   Your provider may give you an injection of a corticosteroid medicine.   While you are recovering from your injury you will need to change your sport or activity to one that does not make your condition worse. For example,  you may need to swim instead of running or bicycling. If you are bicycling, you may need to lower your bicycle seat.   How long do the effects last?   The length of recovery depends on many factors such as your age, health, and if you have had a previous injury. Recovery time also depends on the severity of the injury. A bursa that is only mildly inflamed and has just started to hurt may improve within a few weeks. A bursa that is significantly inflamed and has been painful for a long time may take up to a few months to improve. You need to stop doing the activities that cause pain until your bursa has healed. If you continue doing activities that cause pain, your symptoms will return and it will take longer to recover.   When can I return to my normal activities?   Everyone recovers from an injury at a different rate. Return to your activities depends on how soon your leg recovers, not by how many days or weeks it has been since your injury has occurred. In general, the longer you have symptoms before you start treatment, the longer it will take to get better. The goal of rehabilitation is to return to your normal activities as soon as is safely possible. If you return too soon you may worsen your injury.   You may safely return to your normal activities when, starting from the top of the list and progressing to the end, each of the following is true:   You have full range of motion in the injured leg compared to the uninjured leg.   You have full strength of the injured leg compared to the uninjured leg.   You can walk straight ahead without pain or limping.   How can I prevent trochanteric bursitis?   Trochanteric bursitis is best prevented by warming up properly and stretching the muscles on the outer side of your upper thigh.     Published by [employer].  This content is reviewed periodically and is subject to change as new health information becomes available. The information is intended to inform and educate  and is not a replacement for medical evaluation, advice, diagnosis or treatment by a healthcare professional.   Written by Malcom Sauer MD, for [employer].   ? 2010 [employer] and/or its affiliates. All Rights Reserved.   Copyright   [employer] 2011                     Stretches lower back, side of hip, and neck  1. Sit on floor with left leg straight out in front   2. Bend right leg, cross right foot over, place outside left knee   3. Bend left elbow and rest it outside right knee   4. Place right hand behind hips on floor   5. Turn head over right shoulder, rotate upper body right   6. Hold 10 to 15 seconds   7. Repeat on other side   8. Breathe in slowly               Trochanteric Bursitis Rehabilitation Exercises   You can begin stretching the muscles that run along the outside of your hip using the first exercise. You can do the strengthening exercises when the sharp pain lessens.                    Strengthening exercises:  Start abductor strengthening and begin the exercises demonstrated today.  Leg strengthening:  Hold onto the sink with painful leg toward the sink.  Lift painful leg out to touch the wall with the heel.  Lift 10-15 times, twice a day.                                  Step-up: Stand with the foot of your injured leg on a support 3 to 5 inches high (like a small step or block of wood). Keep your other foot flat on the floor. Shift your weight onto the injured leg on the support. Straighten your injured leg as the other leg comes off the floor. Return to the starting position by bending your injured leg and slowly lowering your uninjured leg back to the floor. Do 3 sets of 10.   Clam exercise: Lie on your uninjured side with your hips and knees bent and feet together. Slowly raise your top leg toward the ceiling while keeping your heels touching each other. Hold for 2 seconds and lower slowly. Do 3 sets of 10 repetitions.   Iliotibial band stretch: Side-bending: Cross one  leg in front of the other leg and lean in the opposite direction from the front leg. Reach the arm on the side of the back leg over your head while you do this. Hold this position for 15 to 30 seconds. Return to the starting position. Repeat 3 times and then switch legs and repeat the exercise.   Published by [employer].  This content is reviewed periodically and is subject to change as new health information becomes available. The information is intended to inform and educate and is not a replacement for medical evaluation, advice, diagnosis or treatment by a healthcare professional.   Written by Lesly Allen, MS, PT, and Reema Ugalde PT, [facility], \[facility] [place] [facility]\"", for [employer].   ? 2010 [employer] and/or its affiliates. All Rights Reserved.   Copyright   Clinical Reference Systems 2011  Adult Health Advisor

## 2019-08-01 NOTE — LETTER
8/1/2019         RE: Bel Cruz  [address] [place] [facility] [id number]        Dear Colleague,    Thank you for referring your patient, Bel Cruz, to the [facility]. Please see a copy of my visit note below.    Bel Cruz is a 58 year old female who is seen in consultation at the request of Dr. Lala Falcon  for right leg pain.  She reports she has had this pain for the past 3 years.  Did not have any injury.  She just woke up with pain.  She describes pain in the right hip, right knee, up and on the thigh, sometimes down in the leg down toward the ankle.  She describes sharp, aching pains rated 5 out of 10.  Pain is worse with walking and working out.  Pain is bad with just trying to live an active life.  She gets somewhat better when it is painful if she turns her leg and knee outward.  She has taken occasional ibuprofen.  She has had physical therapy 2 years ago that did seem to help somewhat.    She has had MRI scan of her lumbar spine 7/23/19 showing no specific abnormalities other than mild foraminal stenosis.  She has had x-ray of her right hip and right knee on 7/26/2019 showing no significant osteoarthritis.    Past Medical History:   Diagnosis Date     Allergic rhinitis due to other allergen      Esophageal reflux      GENERALIZED ANXIETY DIS 6/27/2007     Panic disorder without agoraphobia 1990     Unspecified hypothyroidism     Graves - s/p ablation       Past Surgical History:   Procedure Laterality Date     COLONOSCOPY  7/26/2011    Procedure:COMBINED COLONOSCOPY, REMOVE TUMOR/POLYP/LESION BY SNARE; single polyp removal; Surgeon:YUE LIMON; Location:[facility]     COLONOSCOPY Left 6/24/2016    Procedure: COMBINED COLONOSCOPY, SINGLE OR MULTIPLE BIOPSY/POLYPECTOMY BY BIOPSY;  Surgeon: Joseph Keyes MD;  Location: [facility]     COLONOSCOPY WITH CO2 INSUFFLATION N/A 6/24/2016    Procedure: COLONOSCOPY WITH CO2 INSUFFLATION;  Surgeon: Joseph Keyes  MD Yumiko;  Location: [facility]     ESOPHAGOSCOPY, GASTROSCOPY, DUODENOSCOPY (EGD), COMBINED N/A 5/8/2017    Procedure: COMBINED ESOPHAGOSCOPY, GASTROSCOPY, DUODENOSCOPY (EGD), BIOPSY SINGLE OR MULTIPLE;  ESOPHAGOSCOPY, GASTROSCOPY, DUODENOSCOPY (EGD) wiith multiple biopsies;  Surgeon: Chavez Land MD;  Location: [facility]     HC HYSTEROSCOPY DIAGOSTIC (SEPARATE PROC)  8/10/2004    Hysteroscopy, D&C, Endometrial ablation     PELVIS LAPAROSCOPY,DX  1996    Pelvic pain - adhesions and mild endometriosis     US BREAST CLIP PLACEMENT W BIOPSY LEFT         Family History   Problem Relation Age of Onset     Thyroid Disease Father         Hashimotos       Social History     Socioeconomic History     Marital status:      Spouse name: Barrera     Number of children: 2     Years of education: 15     Highest education level: Not on file   Occupational History     Occupation:      Employer: [employer]   Social Needs     Financial resource strain: Not on file     Food insecurity:     Worry: Not on file     Inability: Not on file     Transportation needs:     Medical: Not on file     Non-medical: Not on file   Tobacco Use     Smoking status: Never Smoker     Smokeless tobacco: Never Used     Tobacco comment: no smokers in household   Substance and Sexual Activity     Alcohol use: No     Drug use: No     Sexual activity: Yes     Partners: Male     Birth control/protection: Surgical     Comment: vasectomy   Lifestyle     Physical activity:     Days per week: Not on file     Minutes per session: Not on file     Stress: Not on file   Relationships     Social connections:     Talks on phone: Not on file     Gets together: Not on file     Attends Islam service: Not on file     Active member of club or organization: Not on file     Attends meetings of clubs or organizations: Not on file     Relationship status: Not on file     Intimate partner violence:     Fear of current or ex partner: Not on file      Emotionally abused: Not on file     Physically abused: Not on file     Forced sexual activity: Not on file   Other Topics Concern      Service No     Blood Transfusions No     Caffeine Concern No     Comment:  no pop, 1 cup coffee every am     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     Hobby Hazards No     Sleep Concern No     Stress Concern No     Weight Concern No     Special Diet No     Back Care Yes     Exercise Yes     Comment: 3-4 times a week     Bike Helmet Yes     Comment: biking      Seat Belt Yes     Self-Exams Yes     Comment: knows BSE, periodically, mammogram done 9/2/09     Parent/sibling w/ CABG, MI or angioplasty before 65F 55M? Not Asked   Social History Narrative     Not on file       Current Outpatient Medications   Medication Sig Dispense Refill     estradiol (ESTRACE) 0.1 MG/GM vaginal cream Place 2 g vaginally twice a week (Patient not taking: Reported on 7/26/2019) 42.5 g 1     IBUPROFEN PO Take 600 mg by mouth every 6 hours as needed for moderate pain       levothyroxine (SYNTHROID/LEVOTHROID) 112 MCG tablet TAKE 1 TABLET(112 MCG) BY MOUTH DAILY 90 tablet 3     Multiple Vitamins-Minerals (WOMENS MULTIVITAMIN PLUS PO) Take 1 tablet by mouth daily. Over 50        Omega-3 Fatty Acids (OMEGA-3 FISH OIL PO) Take by mouth daily         Allergies   Allergen Reactions     Amoxicillin      tightness in throat     Cephalosporins      ceftin     Clindamycin Base      Nausea, lightheadeness     Erythromycin      Welts     Macrolides      Sulfa Drugs Swelling     bactrim       REVIEW OF SYSTEMS:  CONSTITUTIONAL:  NEGATIVE for fever, chills, change in weight, not feeling tired  SKIN:  NEGATIVE for worrisome rashes, no skin lumps, no skin ulcers and no non-healing wounds  EYES:  NEGATIVE for vision changes or irritation.  ENT/MOUTH:  NEGATIVE.  No hearing loss, no hoarseness, no difficulty swallowing.  RESP:  NEGATIVE. No cough or shortness of breath.  CV:  NEGATIVE  "for chest pain, palpitations or peripheral edema  GI:  NEGATIVE for nausea, abdominal pain, heartburn, or change in bowel habits  :  Negative. No dysuria, no hematuria  MUSCULOSKELETAL:  See HPI above  NEURO:  NEGATIVE . No headaches, no dizziness,  no numbness  ENDOCRINE:  NEGATIVE for temperature intolerance, skin/hair changes  HEME/ALLERGY/IMMUNE:  NEGATIVE for bleeding problems  PSYCHIATRIC:  NEGATIVE. no anxiety, no depression.     Exam:  Vitals: /88   Pulse 63   Resp 16   Ht 1.74 m (5' 8.5\")   Wt 78.9 kg (174 lb)   LMP 07/18/2005   BMI 26.07 kg/m     BMI= Body mass index is 26.07 kg/m .  Constitutional:  healthy, alert and no distress  Neuro: Alert and Oriented x 3, Sensation grossly WNL.  Psych: Affect normal   Respiratory: Breathing not labored.  Cardiovascular: normal peripheral pulses  Lymph: no adenopathy  Skin: No rashes,worrisome lesions or skin problems  She had no tenderness along the SI joints.  No tenderness of sciatic notch.  She did have tenderness just at the inferior aspect of the greater trochanter on the right.  She has primarily lateral hip pain with rotation of the right hip.  No pain on rotation of the left.  She had negative impingement test on the hips.  She has good range of motion of the knees.  Has no effusions.  She has negative medial and lateral Marti on both knees.  There is no ligamentous laxity of MCL, LCL, cruciates.  No warmth or erythema.  Sensation, motor and circulation are intact    Assessment: Right leg pain of unclear etiology.  The only real finding is trochanteric bursitis.  This cannot explain the full extent of her pain.  Plan: For now we will have her work on IT band stretching, hip abduction strengthening, rolling of the IT band.  She should sleep with a pillow between her knees.      Again, thank you for allowing me to participate in the care of your patient.        Sincerely,        Barrera Kraft MD    "

## 2019-08-02 PROBLEM — M70.61 TROCHANTERIC BURSITIS OF RIGHT HIP: Status: ACTIVE | Noted: 2019-08-02

## 2019-08-02 NOTE — PROGRESS NOTES
Bel Cruz is a 58 year old female who is seen in consultation at the request of Dr. Lala Falcon  for right leg pain.  She reports she has had this pain for the past 3 years.  Did not have any injury.  She just woke up with pain.  She describes pain in the right hip, right knee, up and on the thigh, sometimes down in the leg down toward the ankle.  She describes sharp, aching pains rated 5 out of 10.  Pain is worse with walking and working out.  Pain is bad with just trying to live an active life.  She gets somewhat better when it is painful if she turns her leg and knee outward.  She has taken occasional ibuprofen.  She has had physical therapy 2 years ago that did seem to help somewhat.    She has had MRI scan of her lumbar spine 7/23/19 showing no specific abnormalities other than mild foraminal stenosis.  She has had x-ray of her right hip and right knee on 7/26/2019 showing no significant osteoarthritis.    Past Medical History:   Diagnosis Date     Allergic rhinitis due to other allergen      Esophageal reflux      GENERALIZED ANXIETY DIS 6/27/2007     Panic disorder without agoraphobia 1990     Unspecified hypothyroidism     Graves - s/p ablation       Past Surgical History:   Procedure Laterality Date     COLONOSCOPY  7/26/2011    Procedure:COMBINED COLONOSCOPY, REMOVE TUMOR/POLYP/LESION BY SNARE; single polyp removal; Surgeon:YUE LIMON; Location:[facility]     COLONOSCOPY Left 6/24/2016    Procedure: COMBINED COLONOSCOPY, SINGLE OR MULTIPLE BIOPSY/POLYPECTOMY BY BIOPSY;  Surgeon: Joseph Keyes MD;  Location: [facility]     COLONOSCOPY WITH CO2 INSUFFLATION N/A 6/24/2016    Procedure: COLONOSCOPY WITH CO2 INSUFFLATION;  Surgeon: Joseph Keyes MD;  Location: [facility]     ESOPHAGOSCOPY, GASTROSCOPY, DUODENOSCOPY (EGD), COMBINED N/A 5/8/2017    Procedure: COMBINED ESOPHAGOSCOPY, GASTROSCOPY, DUODENOSCOPY (EGD), BIOPSY SINGLE OR MULTIPLE;  ESOPHAGOSCOPY, GASTROSCOPY, DUODENOSCOPY  (EGD) imani multiple biopsies;  Surgeon: Chavez Land MD;  Location:  GI      HYSTEROSCOPY DIAGOSTIC (SEPARATE PROC)  8/10/2004    Hysteroscopy, D&C, Endometrial ablation     PELVIS LAPAROSCOPY,DX  1996    Pelvic pain - adhesions and mild endometriosis     US BREAST CLIP PLACEMENT W BIOPSY LEFT         Family History   Problem Relation Age of Onset     Thyroid Disease Father         Hashimotos       Social History     Socioeconomic History     Marital status:      Spouse name: Barrera     Number of children: 2     Years of education: 15     Highest education level: Not on file   Occupational History     Occupation:      Employer:    Social Needs     Financial resource strain: Not on file     Food insecurity:     Worry: Not on file     Inability: Not on file     Transportation needs:     Medical: Not on file     Non-medical: Not on file   Tobacco Use     Smoking status: Never Smoker     Smokeless tobacco: Never Used     Tobacco comment: no smokers in household   Substance and Sexual Activity     Alcohol use: No     Drug use: No     Sexual activity: Yes     Partners: Male     Birth control/protection: Surgical     Comment: vasectomy   Lifestyle     Physical activity:     Days per week: Not on file     Minutes per session: Not on file     Stress: Not on file   Relationships     Social connections:     Talks on phone: Not on file     Gets together: Not on file     Attends Shinto service: Not on file     Active member of club or organization: Not on file     Attends meetings of clubs or organizations: Not on file     Relationship status: Not on file     Intimate partner violence:     Fear of current or ex partner: Not on file     Emotionally abused: Not on file     Physically abused: Not on file     Forced sexual activity: Not on file   Other Topics Concern      Service No     Blood Transfusions No     Caffeine Concern No     Comment:  no pop, 1 cup coffee every  am     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     Hobby Hazards No     Sleep Concern No     Stress Concern No     Weight Concern No     Special Diet No     Back Care Yes     Exercise Yes     Comment: 3-4 times a week     Bike Helmet Yes     Comment: biking      Seat Belt Yes     Self-Exams Yes     Comment: knows BSE, periodically, mammogram done 9/2/09     Parent/sibling w/ CABG, MI or angioplasty before 65F 55M? Not Asked   Social History Narrative     Not on file       Current Outpatient Medications   Medication Sig Dispense Refill     estradiol (ESTRACE) 0.1 MG/GM vaginal cream Place 2 g vaginally twice a week (Patient not taking: Reported on 7/26/2019) 42.5 g 1     IBUPROFEN PO Take 600 mg by mouth every 6 hours as needed for moderate pain       levothyroxine (SYNTHROID/LEVOTHROID) 112 MCG tablet TAKE 1 TABLET(112 MCG) BY MOUTH DAILY 90 tablet 3     Multiple Vitamins-Minerals (WOMENS MULTIVITAMIN PLUS PO) Take 1 tablet by mouth daily. Over 50        Omega-3 Fatty Acids (OMEGA-3 FISH OIL PO) Take by mouth daily         Allergies   Allergen Reactions     Amoxicillin      tightness in throat     Cephalosporins      ceftin     Clindamycin Base      Nausea, lightheadeness     Erythromycin      Welts     Macrolides      Sulfa Drugs Swelling     bactrim       REVIEW OF SYSTEMS:  CONSTITUTIONAL:  NEGATIVE for fever, chills, change in weight, not feeling tired  SKIN:  NEGATIVE for worrisome rashes, no skin lumps, no skin ulcers and no non-healing wounds  EYES:  NEGATIVE for vision changes or irritation.  ENT/MOUTH:  NEGATIVE.  No hearing loss, no hoarseness, no difficulty swallowing.  RESP:  NEGATIVE. No cough or shortness of breath.  CV:  NEGATIVE for chest pain, palpitations or peripheral edema  GI:  NEGATIVE for nausea, abdominal pain, heartburn, or change in bowel habits  :  Negative. No dysuria, no hematuria  MUSCULOSKELETAL:  See HPI above  NEURO:  NEGATIVE . No headaches, no  "dizziness,  no numbness  ENDOCRINE:  NEGATIVE for temperature intolerance, skin/hair changes  HEME/ALLERGY/IMMUNE:  NEGATIVE for bleeding problems  PSYCHIATRIC:  NEGATIVE. no anxiety, no depression.     Exam:  Vitals: /88   Pulse 63   Resp 16   Ht 1.74 m (5' 8.5\")   Wt 78.9 kg (174 lb)   LMP 07/18/2005   BMI 26.07 kg/m    BMI= Body mass index is 26.07 kg/m .  Constitutional:  healthy, alert and no distress  Neuro: Alert and Oriented x 3, Sensation grossly WNL.  Psych: Affect normal   Respiratory: Breathing not labored.  Cardiovascular: normal peripheral pulses  Lymph: no adenopathy  Skin: No rashes,worrisome lesions or skin problems  She had no tenderness along the SI joints.  No tenderness of sciatic notch.  She did have tenderness just at the inferior aspect of the greater trochanter on the right.  She has primarily lateral hip pain with rotation of the right hip.  No pain on rotation of the left.  She had negative impingement test on the hips.  She has good range of motion of the knees.  Has no effusions.  She has negative medial and lateral Marti on both knees.  There is no ligamentous laxity of MCL, LCL, cruciates.  No warmth or erythema.  Sensation, motor and circulation are intact    Assessment: Right leg pain of unclear etiology.  The only real finding is trochanteric bursitis.  This cannot explain the full extent of her pain.  Plan: For now we will have her work on IT band stretching, hip abduction strengthening, rolling of the IT band.  She should sleep with a pillow between her knees.    "

## 2019-08-27 ENCOUNTER — TRANSFERRED RECORDS (OUTPATIENT)
Dept: HEALTH INFORMATION MANAGEMENT | Facility: CLINIC | Age: 59
End: 2019-08-27

## 2019-08-28 ENCOUNTER — TRANSFERRED RECORDS (OUTPATIENT)
Dept: HEALTH INFORMATION MANAGEMENT | Facility: CLINIC | Age: 59
End: 2019-08-28

## 2019-09-01 ENCOUNTER — TRANSFERRED RECORDS (OUTPATIENT)
Dept: HEALTH INFORMATION MANAGEMENT | Facility: CLINIC | Age: 59
End: 2019-09-01

## 2019-09-04 ENCOUNTER — TRANSFERRED RECORDS (OUTPATIENT)
Dept: HEALTH INFORMATION MANAGEMENT | Facility: CLINIC | Age: 59
End: 2019-09-04

## 2019-09-16 ENCOUNTER — TRANSFERRED RECORDS (OUTPATIENT)
Dept: HEALTH INFORMATION MANAGEMENT | Facility: CLINIC | Age: 59
End: 2019-09-16

## 2019-09-27 ENCOUNTER — HEALTH MAINTENANCE LETTER (OUTPATIENT)
Age: 59
End: 2019-09-27

## 2019-10-03 DIAGNOSIS — E03.9 HYPOTHYROIDISM, UNSPECIFIED TYPE: ICD-10-CM

## 2019-10-03 RX ORDER — LEVOTHYROXINE SODIUM 112 UG/1
TABLET ORAL
Qty: 90 TABLET | Refills: 2 | Status: SHIPPED | OUTPATIENT
Start: 2019-10-03 | End: 2020-04-03

## 2019-10-10 ENCOUNTER — TRANSFERRED RECORDS (OUTPATIENT)
Dept: HEALTH INFORMATION MANAGEMENT | Facility: CLINIC | Age: 59
End: 2019-10-10

## 2019-11-21 ENCOUNTER — TRANSFERRED RECORDS (OUTPATIENT)
Dept: HEALTH INFORMATION MANAGEMENT | Facility: CLINIC | Age: 59
End: 2019-11-21

## 2019-12-19 ENCOUNTER — ALLIED HEALTH/NURSE VISIT (OUTPATIENT)
Dept: FAMILY MEDICINE | Facility: OTHER | Age: 59
End: 2019-12-19
Payer: COMMERCIAL

## 2019-12-19 DIAGNOSIS — Z23 NEED FOR PROPHYLACTIC VACCINATION AND INOCULATION AGAINST INFLUENZA: Primary | ICD-10-CM

## 2019-12-19 PROCEDURE — 99207 ZZC NO CHARGE NURSE ONLY: CPT

## 2019-12-19 PROCEDURE — 90682 RIV4 VACC RECOMBINANT DNA IM: CPT

## 2019-12-19 PROCEDURE — 90471 IMMUNIZATION ADMIN: CPT

## 2020-04-02 ENCOUNTER — MYC MEDICAL ADVICE (OUTPATIENT)
Dept: FAMILY MEDICINE | Facility: OTHER | Age: 60
End: 2020-04-02

## 2020-04-03 ENCOUNTER — MYC REFILL (OUTPATIENT)
Dept: FAMILY MEDICINE | Facility: OTHER | Age: 60
End: 2020-04-03

## 2020-04-03 ENCOUNTER — E-VISIT (OUTPATIENT)
Dept: FAMILY MEDICINE | Facility: OTHER | Age: 60
End: 2020-04-03
Payer: COMMERCIAL

## 2020-04-03 DIAGNOSIS — F41.1 GENERALIZED ANXIETY DISORDER: ICD-10-CM

## 2020-04-03 DIAGNOSIS — E03.9 HYPOTHYROIDISM, UNSPECIFIED TYPE: ICD-10-CM

## 2020-04-03 PROBLEM — F41.8 SITUATIONAL ANXIETY: Status: RESOLVED | Noted: 2019-07-26 | Resolved: 2020-04-03

## 2020-04-03 PROCEDURE — 99421 OL DIG E/M SVC 5-10 MIN: CPT | Performed by: FAMILY MEDICINE

## 2020-04-03 RX ORDER — PAROXETINE 10 MG/1
TABLET, FILM COATED ORAL
Qty: 60 TABLET | Refills: 0 | Status: SHIPPED | OUTPATIENT
Start: 2020-04-03 | End: 2020-04-28 | Stop reason: SINTOL

## 2020-04-03 RX ORDER — LEVOTHYROXINE SODIUM 112 UG/1
112 TABLET ORAL DAILY
Qty: 90 TABLET | Refills: 0 | Status: SHIPPED | OUTPATIENT
Start: 2020-04-03 | End: 2020-10-07

## 2020-04-03 ASSESSMENT — ANXIETY QUESTIONNAIRES
1. FEELING NERVOUS, ANXIOUS, OR ON EDGE: NEARLY EVERY DAY
7. FEELING AFRAID AS IF SOMETHING AWFUL MIGHT HAPPEN: NEARLY EVERY DAY
GAD7 TOTAL SCORE: 20
GAD7 TOTAL SCORE: 20
4. TROUBLE RELAXING: NEARLY EVERY DAY
6. BECOMING EASILY ANNOYED OR IRRITABLE: MORE THAN HALF THE DAYS
2. NOT BEING ABLE TO STOP OR CONTROL WORRYING: NEARLY EVERY DAY
5. BEING SO RESTLESS THAT IT IS HARD TO SIT STILL: NEARLY EVERY DAY
7. FEELING AFRAID AS IF SOMETHING AWFUL MIGHT HAPPEN: NEARLY EVERY DAY
3. WORRYING TOO MUCH ABOUT DIFFERENT THINGS: NEARLY EVERY DAY

## 2020-04-03 NOTE — TELEPHONE ENCOUNTER
Pending Prescriptions:                       Disp   Refills    levothyroxine (SYNTHROID/LEVOTHROID) 112 *90 tab*2          Prescription approved per [facility] Refill Protocol.    Kathleen Restrepo, MSN, RN

## 2020-04-04 ASSESSMENT — ANXIETY QUESTIONNAIRES: GAD7 TOTAL SCORE: 20

## 2020-04-27 ENCOUNTER — E-VISIT (OUTPATIENT)
Dept: FAMILY MEDICINE | Facility: OTHER | Age: 60
End: 2020-04-27
Payer: COMMERCIAL

## 2020-04-27 DIAGNOSIS — R19.7 DIARRHEA, UNSPECIFIED TYPE: ICD-10-CM

## 2020-04-27 DIAGNOSIS — F41.1 GENERALIZED ANXIETY DISORDER: Primary | ICD-10-CM

## 2020-04-27 PROCEDURE — 99421 OL DIG E/M SVC 5-10 MIN: CPT | Performed by: FAMILY MEDICINE

## 2020-04-28 RX ORDER — VENLAFAXINE HYDROCHLORIDE 37.5 MG/1
37.5 CAPSULE, EXTENDED RELEASE ORAL DAILY
Qty: 30 CAPSULE | Refills: 0 | Status: SHIPPED | OUTPATIENT
Start: 2020-04-28 | End: 2020-06-04

## 2020-05-12 NOTE — PROGRESS NOTES
[address]  [facility] [address]  [phone]  Dept: [phone]    PRE-OP EVALUATION:  Today's date: 5/15/2020    Bel Cruz (: 1960) presents for pre-operative evaluation assessment as requested by Dr. Bishop .  She requires evaluation and anesthesia risk assessment prior to undergoing surgery/procedure for treatment of bilateral cataracts.    Fax number for surgical facility: [phone]  Primary Physician: Lala Falcon  Type of Anesthesia Anticipated: to be determined    Patient has a Health Care Directive or Living Will:  NO    Preop Questions 5/15/2020   Who is doing your surgery? [facility]   What are you having done? Cataract and refraction laser surgery   Date of Surgery/Procedure: 20   Facility or Hospital where procedure/surgery will be performed: [facility]   1.  Do you have a history of Heart attack, stroke, stent, coronary bypass surgery, or other heart surgery? No   2.  Do you ever have any pain or discomfort in your chest? No   3.  Do you have a history of  Heart Failure? No   4.   Are you troubled by shortness of breath when:  walking on a level surface, or up a slight hill, or at night? No   5.  Do you currently have a cold, bronchitis or other respiratory infection? No   6.  Do you have a cough, shortness of breath, or wheezing? No   7.  Do you sometimes get pains in the calves of your legs when you walk? No   8. Do you or anyone in your family have previous history of blood clots? No   9.  Do you or does anyone in your family have a serious bleeding problem such as prolonged bleeding following surgeries or cuts? No   10. Have you ever had problems with anemia or been told to take iron pills? No   11. Have you had any abnormal blood loss such as black, tarry or bloody stools, or abnormal vaginal bleeding? No   12. Have you ever had a blood transfusion? No   13. Have you or any of your relatives ever  had problems with anesthesia? No   14. Do you have sleep apnea, excessive snoring or daytime drowsiness? No   15. Do you have any prosthetic heart valves? No   16. Do you have prosthetic joints? No   17. Is there any chance that you may be pregnant? No         HPI:     HPI related to upcoming procedure: cataract surgery      HYPOTHYROIDISM - Patient has a longstanding history of chronic Hypothyroidism. Patient has been doing well, noting no tremor, insomnia, hair loss or changes in skin texture. Continues to take medications as directed, without adverse reactions or side effects. Last TSH   Lab Results   Component Value Date    TSH 1.44 05/15/2020   .        MEDICAL HISTORY:     Patient Active Problem List    Diagnosis Date Noted     Trochanteric bursitis of right hip 08/02/2019     Priority: Medium     Atrophic vaginitis 12/15/2015     Priority: Medium     Generalized anxiety disorder 06/27/2007     Priority: Medium     Hypothyroidism 08/02/2005     Priority: Medium      Past Medical History:   Diagnosis Date     Allergic rhinitis due to other allergen      Esophageal reflux      GENERALIZED ANXIETY DIS 6/27/2007     Panic disorder without agoraphobia 1990     Unspecified hypothyroidism     Graves - s/p ablation     Past Surgical History:   Procedure Laterality Date     COLONOSCOPY  7/26/2011    Procedure:COMBINED COLONOSCOPY, REMOVE TUMOR/POLYP/LESION BY SNARE; single polyp removal; Surgeon:YUE LIMON; Location:[facility]     COLONOSCOPY Left 6/24/2016    Procedure: COMBINED COLONOSCOPY, SINGLE OR MULTIPLE BIOPSY/POLYPECTOMY BY BIOPSY;  Surgeon: Joseph Keyes MD;  Location: [facility]     COLONOSCOPY WITH CO2 INSUFFLATION N/A 6/24/2016    Procedure: COLONOSCOPY WITH CO2 INSUFFLATION;  Surgeon: Joseph Keyes MD;  Location: [facility]     ESOPHAGOSCOPY, GASTROSCOPY, DUODENOSCOPY (EGD), COMBINED N/A 5/8/2017    Procedure: COMBINED ESOPHAGOSCOPY, GASTROSCOPY, DUODENOSCOPY (EGD), BIOPSY SINGLE OR  MULTIPLE;  ESOPHAGOSCOPY, GASTROSCOPY, DUODENOSCOPY (EGD) wiith multiple biopsies;  Surgeon: Chavez Land MD;  Location:  GI      HYSTEROSCOPY DIAGOSTIC (SEPARATE PROC)  8/10/2004    Hysteroscopy, D&C, Endometrial ablation     PELVIS LAPAROSCOPY,DX  1996    Pelvic pain - adhesions and mild endometriosis     US BREAST CLIP PLACEMENT W BIOPSY LEFT       Current Outpatient Medications   Medication Sig Dispense Refill     levothyroxine (SYNTHROID/LEVOTHROID) 112 MCG tablet Take 1 tablet (112 mcg) by mouth daily 90 tablet 0     Multiple Vitamins-Minerals (WOMENS MULTIVITAMIN PLUS PO) Take 1 tablet by mouth daily. Over 50        venlafaxine (EFFEXOR-XR) 37.5 MG 24 hr capsule Take 1 capsule (37.5 mg) by mouth daily 30 capsule 0     OTC products: None, except as noted above    Allergies   Allergen Reactions     Amoxicillin      tightness in throat     Cephalosporins      ceftin     Clindamycin Base      Nausea, lightheadeness     Erythromycin      Welts     Macrolides      Sulfa Drugs Swelling     bactrim      Latex Allergy: NO    Social History     Tobacco Use     Smoking status: Never Smoker     Smokeless tobacco: Never Used     Tobacco comment: no smokers in household   Substance Use Topics     Alcohol use: No     History   Drug Use No       REVIEW OF SYSTEMS:   CONSTITUTIONAL: NEGATIVE for fever, chills, change in weight  INTEGUMENTARY/SKIN: NEGATIVE for worrisome rashes, moles or lesions  EYES: POSITIVE for cataract causing vision changes  ENT/MOUTH: NEGATIVE for ear, mouth and throat problems  RESP: NEGATIVE for significant cough or SOB  CV: NEGATIVE for chest pain, palpitations or peripheral edema  GI: NEGATIVE for nausea, abdominal pain, heartburn, or change in bowel habits  : NEGATIVE for frequency, dysuria, or hematuria  MUSCULOSKELETAL: NEGATIVE for significant arthralgias or myalgia  NEURO: NEGATIVE for weakness, dizziness or paresthesias  ENDOCRINE: NEGATIVE for temperature intolerance,  "skin/hair changes  HEME: NEGATIVE for bleeding problems  PSYCHIATRIC: NEGATIVE for changes in mood or affect    EXAM:   /80   Pulse 90   Temp 98  F (36.7  C) (Temporal)   Resp 16   Ht 1.745 m (5' 8.7\")   Wt 77.6 kg (171 lb)   LMP 07/18/2005   SpO2 98%   BMI 25.47 kg/m      GENERAL APPEARANCE: healthy, alert and no distress     EYES: EOMI, PERRL     HENT: ear canals and TM's normal and nose and mouth without ulcers or lesions     NECK: no adenopathy, no asymmetry, masses, or scars and thyroid normal to palpation     RESP: lungs clear to auscultation - no rales, rhonchi or wheezes     CV: regular rates and rhythm, normal S1 S2, no S3 or S4 and no murmur, click or rub     ABDOMEN:  soft, nontender, no HSM or masses and bowel sounds normal     MS: extremities normal- no gross deformities noted, no evidence of inflammation in joints, FROM in all extremities.     SKIN: no suspicious lesions or rashes     NEURO: Normal strength and tone, sensory exam grossly normal, mentation intact and speech normal     PSYCH: mentation appears normal. and affect normal/bright     LYMPHATICS: No cervical adenopathy    DIAGNOSTICS:   No labs or EKG required for low risk surgery (cataract, skin procedure, breast biopsy, etc)    Recent Labs   Lab Test 06/14/18  1201 12/02/16  1611   HGB 14.3 14.6    218   NA  --  140   POTASSIUM  --  3.8   CR  --  0.80        IMPRESSION:   Reason for surgery/procedure: cataract and laser surgery  Diagnosis/reason for consult: preop    The proposed surgical procedure is considered LOW risk.    REVISED CARDIAC RISK INDEX  The patient has the following serious cardiovascular risks for perioperative complications such as (MI, PE, VFib and 3  AV Block):  No serious cardiac risks  INTERPRETATION: 0 risks: Class I (very low risk - 0.4% complication rate)    The patient has the following additional risks for perioperative complications:  No identified additional risks      ICD-10-CM    1. Preop " general physical exam  Z01.818    2. Cataract, unspecified cataract type, unspecified laterality  H26.9    3. Hypothyroidism, unspecified type  E03.9 TSH with free T4 reflex   4. Encounter for laboratory testing for COVID-19 virus  Z11.59 COVID-19 Virus (Coronavirus) Antibody   5. Screening for diabetes mellitus  Z13.1 Basic metabolic panel  (Ca, Cl, CO2, Creat, Gluc, K, Na, BUN)       RECOMMENDATIONS:       APPROVAL GIVEN to proceed with proposed procedure, without further diagnostic evaluation       Signed Electronically by: RUBÉN Neal CNP    Copy of this evaluation report is provided to requesting physician.    [facility] Preop Guidelines    Revised Cardiac Risk Index      Answers for HPI/ROS submitted by the patient on 5/15/2020   If you checked off any problems, how difficult have these problems made it for you to do your work, take care of things at home, or get along with other people?: Not difficult at all  PHQ9 TOTAL SCORE: 2  HUMBERTO 7 TOTAL SCORE: 0

## 2020-05-15 ENCOUNTER — OFFICE VISIT (OUTPATIENT)
Dept: FAMILY MEDICINE | Facility: OTHER | Age: 60
End: 2020-05-15
Payer: COMMERCIAL

## 2020-05-15 VITALS
WEIGHT: 171 LBS | HEART RATE: 90 BPM | RESPIRATION RATE: 16 BRPM | SYSTOLIC BLOOD PRESSURE: 132 MMHG | OXYGEN SATURATION: 98 % | TEMPERATURE: 98 F | BODY MASS INDEX: 25.33 KG/M2 | HEIGHT: 69 IN | DIASTOLIC BLOOD PRESSURE: 80 MMHG

## 2020-05-15 DIAGNOSIS — Z13.1 SCREENING FOR DIABETES MELLITUS: ICD-10-CM

## 2020-05-15 DIAGNOSIS — E03.9 HYPOTHYROIDISM, UNSPECIFIED TYPE: ICD-10-CM

## 2020-05-15 DIAGNOSIS — Z01.818 PREOP GENERAL PHYSICAL EXAM: Primary | ICD-10-CM

## 2020-05-15 DIAGNOSIS — H26.9 CATARACT, UNSPECIFIED CATARACT TYPE, UNSPECIFIED LATERALITY: ICD-10-CM

## 2020-05-15 DIAGNOSIS — Z20.822 ENCOUNTER FOR LABORATORY TESTING FOR COVID-19 VIRUS: ICD-10-CM

## 2020-05-15 LAB
ANION GAP SERPL CALCULATED.3IONS-SCNC: 4 MMOL/L (ref 3–14)
BUN SERPL-MCNC: 12 MG/DL (ref 7–30)
CALCIUM SERPL-MCNC: 9.2 MG/DL (ref 8.5–10.1)
CHLORIDE SERPL-SCNC: 104 MMOL/L (ref 94–109)
CO2 SERPL-SCNC: 31 MMOL/L (ref 20–32)
CREAT SERPL-MCNC: 0.88 MG/DL (ref 0.52–1.04)
GFR SERPL CREATININE-BSD FRML MDRD: 71 ML/MIN/{1.73_M2}
GLUCOSE SERPL-MCNC: 82 MG/DL (ref 70–99)
POTASSIUM SERPL-SCNC: 4.2 MMOL/L (ref 3.4–5.3)
SODIUM SERPL-SCNC: 139 MMOL/L (ref 133–144)
TSH SERPL DL<=0.005 MIU/L-ACNC: 1.44 MU/L (ref 0.4–4)

## 2020-05-15 PROCEDURE — 36415 COLL VENOUS BLD VENIPUNCTURE: CPT | Performed by: STUDENT IN AN ORGANIZED HEALTH CARE EDUCATION/TRAINING PROGRAM

## 2020-05-15 PROCEDURE — 80048 BASIC METABOLIC PNL TOTAL CA: CPT | Performed by: STUDENT IN AN ORGANIZED HEALTH CARE EDUCATION/TRAINING PROGRAM

## 2020-05-15 PROCEDURE — 99214 OFFICE O/P EST MOD 30 MIN: CPT | Performed by: STUDENT IN AN ORGANIZED HEALTH CARE EDUCATION/TRAINING PROGRAM

## 2020-05-15 PROCEDURE — 84443 ASSAY THYROID STIM HORMONE: CPT | Performed by: STUDENT IN AN ORGANIZED HEALTH CARE EDUCATION/TRAINING PROGRAM

## 2020-05-15 ASSESSMENT — ANXIETY QUESTIONNAIRES
1. FEELING NERVOUS, ANXIOUS, OR ON EDGE: NOT AT ALL
7. FEELING AFRAID AS IF SOMETHING AWFUL MIGHT HAPPEN: NOT AT ALL
4. TROUBLE RELAXING: NOT AT ALL
5. BEING SO RESTLESS THAT IT IS HARD TO SIT STILL: NOT AT ALL
GAD7 TOTAL SCORE: 0
6. BECOMING EASILY ANNOYED OR IRRITABLE: NOT AT ALL
7. FEELING AFRAID AS IF SOMETHING AWFUL MIGHT HAPPEN: NOT AT ALL
GAD7 TOTAL SCORE: 0
3. WORRYING TOO MUCH ABOUT DIFFERENT THINGS: NOT AT ALL
2. NOT BEING ABLE TO STOP OR CONTROL WORRYING: NOT AT ALL
GAD7 TOTAL SCORE: 0

## 2020-05-15 ASSESSMENT — PATIENT HEALTH QUESTIONNAIRE - PHQ9
SUM OF ALL RESPONSES TO PHQ QUESTIONS 1-9: 2
10. IF YOU CHECKED OFF ANY PROBLEMS, HOW DIFFICULT HAVE THESE PROBLEMS MADE IT FOR YOU TO DO YOUR WORK, TAKE CARE OF THINGS AT HOME, OR GET ALONG WITH OTHER PEOPLE: NOT DIFFICULT AT ALL
SUM OF ALL RESPONSES TO PHQ QUESTIONS 1-9: 2

## 2020-05-15 ASSESSMENT — MIFFLIN-ST. JEOR: SCORE: 1405.28

## 2020-05-16 ASSESSMENT — PATIENT HEALTH QUESTIONNAIRE - PHQ9: SUM OF ALL RESPONSES TO PHQ QUESTIONS 1-9: 2

## 2020-05-16 ASSESSMENT — ANXIETY QUESTIONNAIRES: GAD7 TOTAL SCORE: 0

## 2020-06-03 DIAGNOSIS — F41.1 GENERALIZED ANXIETY DISORDER: ICD-10-CM

## 2020-06-03 NOTE — TELEPHONE ENCOUNTER
Pending Prescriptions:                       Disp   Refills    PARoxetine (PAXIL) 10 MG tablet [Pharmacy*60 tab*0            Sig: TAKE 1/2 TABLET BY MOUTH DAILY FOR 8 DAYS THEN           TAKE 1 TABLET BY MOUTH DAILY    Routing refill request to provider for review/approval because:  Discontinued 4/27/20 e visit due to side effects    Kathleen Restrepo, MSN, RN

## 2020-06-04 RX ORDER — PAROXETINE 10 MG/1
TABLET, FILM COATED ORAL
Qty: 60 TABLET | Refills: 0 | OUTPATIENT
Start: 2020-06-04

## 2020-06-05 NOTE — PROGRESS NOTES
"Subjective     Bel Cruz is a 59 year old female who presents to clinic today for the following health issues:    HPI   Rash  Onset: \"A while\"    Description:   Location: Dark spots on her face, mole,   Character: Raised mole on the Jaw line and brown spots.  Itching (Pruritis): no     Progression of Symptoms:  same    Accompanying Signs & Symptoms:  Denies itching,       History:   Previous similar rash: no   She also has a skin tag on her left eyelid that she would like removed.    Concern - Blood pressure concern  Onset: May     Description:   Patients blood pressure was elevated at preop and again at both surgeries.  Patient started taking BP at home, BP this morning at 6:15am 154/104 HR 86, She reports that her chest feels tight all the time. 168/118 was the highest and multiple readings in the 150s/90s, she had one blood pressure that was 118/80 at home.  She has some chest tightness that is intermittent and is not associated with exertion.  For instance she reports that she was sitting on the couch and it started to feel tight.  She is active and exercises and eats well.  She has had no changes in her diet or weight.  She has had increased anxiety for the past couple months with the pandemic as she worries about her kids and their health and safety.  She was on Paxil for many years and went off of it because she was feeling great.  She did very well for several years off of Paxil and then earlier this year she reached out to her PCP to restart it because of the anxiety.  She noted that it started working almost immediately but after 4 days of taking it she developed diarrhea.  Her PCP recommended stopping the medication to see if this was the cause and once she stopped Paxil the diarrhea stopped.  Her PCP recommended starting venlafaxine but she was hesitant.  She then did some research online about venlafaxine and read multiple negative reviews so she does not want to try that medication.  She is " wondering if her increased anxiety is causing the elevated blood pressure.  She is not having panic attacks which she had many years back when she first started Paxil.    Patient Active Problem List   Diagnosis     Hypothyroidism     Generalized anxiety disorder     Atrophic vaginitis     Trochanteric bursitis of right hip     Past Surgical History:   Procedure Laterality Date     COLONOSCOPY  7/26/2011    Procedure:COMBINED COLONOSCOPY, REMOVE TUMOR/POLYP/LESION BY SNARE; single polyp removal; Surgeon:YUE LIMON; Location:[facility]     COLONOSCOPY Left 6/24/2016    Procedure: COMBINED COLONOSCOPY, SINGLE OR MULTIPLE BIOPSY/POLYPECTOMY BY BIOPSY;  Surgeon: Joseph Keyes MD;  Location: [facility]     COLONOSCOPY WITH CO2 INSUFFLATION N/A 6/24/2016    Procedure: COLONOSCOPY WITH CO2 INSUFFLATION;  Surgeon: Joseph Keyes MD;  Location: [facility]     ESOPHAGOSCOPY, GASTROSCOPY, DUODENOSCOPY (EGD), COMBINED N/A 5/8/2017    Procedure: COMBINED ESOPHAGOSCOPY, GASTROSCOPY, DUODENOSCOPY (EGD), BIOPSY SINGLE OR MULTIPLE;  ESOPHAGOSCOPY, GASTROSCOPY, DUODENOSCOPY (EGD) wiith multiple biopsies;  Surgeon: Chavez Land MD;  Location:  GI     HC HYSTEROSCOPY DIAGOSTIC (SEPARATE PROC)  8/10/2004    Hysteroscopy, D&C, Endometrial ablation     PELVIS LAPAROSCOPY,DX  1996    Pelvic pain - adhesions and mild endometriosis     US BREAST CLIP PLACEMENT W BIOPSY LEFT         Social History     Tobacco Use     Smoking status: Never Smoker     Smokeless tobacco: Never Used     Tobacco comment: no smokers in household   Substance Use Topics     Alcohol use: No     Family History   Problem Relation Age of Onset     Thyroid Disease Father         Hashimotos         Current Outpatient Medications   Medication Sig Dispense Refill     FLUoxetine (PROZAC) 10 MG capsule Take 1 capsule (10 mg) by mouth daily 30 capsule 0     levothyroxine (SYNTHROID/LEVOTHROID) 112 MCG tablet Take 1 tablet (112 mcg) by mouth daily 90  tablet 0     losartan (COZAAR) 25 MG tablet Take 0.5 tablets (12.5 mg) by mouth daily 15 tablet 0     Multiple Vitamins-Minerals (WOMENS MULTIVITAMIN PLUS PO) Take 1 tablet by mouth daily. Over 50        Allergies   Allergen Reactions     Amoxicillin      tightness in throat     Cephalosporins      ceftin     Clindamycin Base      Nausea, lightheadeness     Erythromycin      Welts     Macrolides      Sulfa Drugs Swelling     bactrim     BP Readings from Last 3 Encounters:   06/10/20 (!) 118/90   05/15/20 132/80   08/01/19 125/88    Wt Readings from Last 3 Encounters:   06/10/20 77.9 kg (171 lb 12.8 oz)   05/15/20 77.6 kg (171 lb)   08/01/19 78.9 kg (174 lb)                    Reviewed and updated as needed this visit by Provider         Review of Systems   Constitutional, HEENT, cardiovascular, pulmonary, GI, , musculoskeletal, neuro, skin, endocrine and psych systems are negative, except as otherwise noted.      Objective    BP (!) 118/90   Pulse 99   Temp 97.4  F (36.3  C) (Temporal)   Resp 12   Wt 77.9 kg (171 lb 12.8 oz)   LMP 07/18/2005   SpO2 98%   BMI 25.59 kg/m    Body mass index is 25.59 kg/m .  Physical Exam   GENERAL: healthy, alert and no distress  EYES: Eyes grossly normal to inspection, PERRL and conjunctivae and sclerae normal  NECK: no adenopathy, no asymmetry, masses, or scars and thyroid normal to palpation  RESP: lungs clear to auscultation - no rales, rhonchi or wheezes  CV: regular rate and rhythm, normal S1 S2, no S3 or S4, no murmur, click or rub, no peripheral edema  ABDOMEN: soft, nontender, no hepatosplenomegaly, no masses and bowel sounds normal  MS: no gross musculoskeletal defects noted, no edema  SKIN: tiny papular lesions on left forehead, small skin tag left eyelid, hyperpigmented lesions on cheeks  NEURO: Normal strength and tone, mentation intact and speech normal  PSYCH: mentation appears normal, anxious, judgement and insight intact and appearance well  groomed    Diagnostic Test Results:  Labs reviewed in Epic        Assessment & Plan     1. Generalized anxiety disorder  Worsened over the past couple months.  Tried Paxil for the second time earlier this year and had a side effect of diarrhea.  She does not want to try venlafaxine as she read some negative reviews about the medication online.  I recommended trial of a different SSRI and she would like to start at a low dose.  It is difficult to discern whether her blood pressure is elevated because of stress and high anxiety or not.  Her EKG today is normal.  She has not had fasting cholesterol done in a while so I recommended she have this done with a lab only appointment since she is not fasting today.  I recommended that she start on a low-dose of a blood pressure medication to treat the high blood pressure for the time being as she can always come off of the medication in the future if as her anxiety is treated her blood pressure improves.  She would like to start the fluoxetine and then after a couple days start the losartan just so she knows if she were to have side effects which medication she is having side effects from.  Follow-up with PCP in 2 to 4 weeks regarding blood pressure.  Recommended that she does not check her blood pressure frequently as that can elevate blood pressure.  Recommended she check it just once a day.   - FLUoxetine (PROZAC) 10 MG capsule; Take 1 capsule (10 mg) by mouth daily  Dispense: 30 capsule; Refill: 0    2. Benign essential hypertension  See above notes  - losartan (COZAAR) 25 MG tablet; Take 0.5 tablets (12.5 mg) by mouth daily  Dispense: 15 tablet; Refill: 0    3. Skin lesions  - DERMATOLOGY REFERRAL    4. Chest discomfort  EKG is normal.  I suspect the tightness in her chest may be related to anxiety and or reflux.  Recommend she start daily PPI for the next 30 days.  - EKG 12-lead complete w/read - Clinics    5. Lipid screening  - Lipid panel reflex to direct LDL Fasting;  "Future     BMI:   Estimated body mass index is 25.59 kg/m  as calculated from the following:    Height as of 5/15/20: 1.745 m (5' 8.7\").    Weight as of this encounter: 77.9 kg (171 lb 12.8 oz).   Weight management plan: Discussed healthy diet and exercise guidelines        No follow-ups on file.    RUBÉN [facility]    "

## 2020-06-10 ENCOUNTER — OFFICE VISIT (OUTPATIENT)
Dept: FAMILY MEDICINE | Facility: OTHER | Age: 60
End: 2020-06-10
Payer: COMMERCIAL

## 2020-06-10 VITALS
DIASTOLIC BLOOD PRESSURE: 90 MMHG | BODY MASS INDEX: 25.59 KG/M2 | RESPIRATION RATE: 12 BRPM | HEART RATE: 99 BPM | OXYGEN SATURATION: 98 % | SYSTOLIC BLOOD PRESSURE: 118 MMHG | TEMPERATURE: 97.4 F | WEIGHT: 171.8 LBS

## 2020-06-10 DIAGNOSIS — R07.89 CHEST DISCOMFORT: ICD-10-CM

## 2020-06-10 DIAGNOSIS — I10 BENIGN ESSENTIAL HYPERTENSION: ICD-10-CM

## 2020-06-10 DIAGNOSIS — F41.1 GENERALIZED ANXIETY DISORDER: Primary | ICD-10-CM

## 2020-06-10 DIAGNOSIS — L98.9 SKIN LESION: ICD-10-CM

## 2020-06-10 DIAGNOSIS — Z13.220 LIPID SCREENING: ICD-10-CM

## 2020-06-10 PROCEDURE — 93000 ELECTROCARDIOGRAM COMPLETE: CPT | Performed by: STUDENT IN AN ORGANIZED HEALTH CARE EDUCATION/TRAINING PROGRAM

## 2020-06-10 PROCEDURE — 99214 OFFICE O/P EST MOD 30 MIN: CPT | Performed by: STUDENT IN AN ORGANIZED HEALTH CARE EDUCATION/TRAINING PROGRAM

## 2020-06-10 RX ORDER — FLUOXETINE 10 MG/1
10 CAPSULE ORAL DAILY
Qty: 30 CAPSULE | Refills: 0 | Status: SHIPPED | OUTPATIENT
Start: 2020-06-10 | End: 2020-10-27

## 2020-06-10 RX ORDER — LOSARTAN POTASSIUM 25 MG/1
12.5 TABLET ORAL DAILY
Qty: 15 TABLET | Refills: 0 | Status: SHIPPED | OUTPATIENT
Start: 2020-06-10 | End: 2020-10-27

## 2020-06-10 ASSESSMENT — PAIN SCALES - GENERAL: PAINLEVEL: NO PAIN (0)

## 2020-06-10 NOTE — PATIENT INSTRUCTIONS
Start fluoxetine 10 mg once daily for anxiety.    Monitor blood pressure once a day at different times.    Call to schedule dermatology appointment.    BERTIN Weaver

## 2020-06-19 ENCOUNTER — E-VISIT (OUTPATIENT)
Dept: FAMILY MEDICINE | Facility: OTHER | Age: 60
End: 2020-06-19
Payer: COMMERCIAL

## 2020-06-19 DIAGNOSIS — I10 BENIGN ESSENTIAL HYPERTENSION: ICD-10-CM

## 2020-06-19 DIAGNOSIS — R07.9 CHEST PAIN, UNSPECIFIED TYPE: Primary | ICD-10-CM

## 2020-06-19 PROCEDURE — 99421 OL DIG E/M SVC 5-10 MIN: CPT | Performed by: STUDENT IN AN ORGANIZED HEALTH CARE EDUCATION/TRAINING PROGRAM

## 2020-06-25 ENCOUNTER — HOSPITAL ENCOUNTER (OUTPATIENT)
Dept: CARDIOLOGY | Facility: CLINIC | Age: 60
Discharge: HOME | End: 2020-06-25
Attending: STUDENT IN AN ORGANIZED HEALTH CARE EDUCATION/TRAINING PROGRAM | Admitting: STUDENT IN AN ORGANIZED HEALTH CARE EDUCATION/TRAINING PROGRAM
Payer: COMMERCIAL

## 2020-06-25 DIAGNOSIS — R07.9 CHEST PAIN, UNSPECIFIED TYPE: ICD-10-CM

## 2020-06-25 DIAGNOSIS — I10 BENIGN ESSENTIAL HYPERTENSION: ICD-10-CM

## 2020-06-25 PROCEDURE — 93306 TTE W/DOPPLER COMPLETE: CPT

## 2020-06-25 PROCEDURE — 93306 TTE W/DOPPLER COMPLETE: CPT | Mod: 26 | Performed by: INTERNAL MEDICINE

## 2020-07-23 ENCOUNTER — ANCILLARY PROCEDURE (OUTPATIENT)
Dept: MAMMOGRAPHY | Facility: OTHER | Age: 60
End: 2020-07-23
Attending: FAMILY MEDICINE
Payer: COMMERCIAL

## 2020-07-23 DIAGNOSIS — Z12.31 VISIT FOR SCREENING MAMMOGRAM: ICD-10-CM

## 2020-07-23 PROCEDURE — 77063 BREAST TOMOSYNTHESIS BI: CPT | Mod: TC

## 2020-07-23 PROCEDURE — 77067 SCR MAMMO BI INCL CAD: CPT | Mod: TC

## 2020-10-07 DIAGNOSIS — E03.9 HYPOTHYROIDISM, UNSPECIFIED TYPE: ICD-10-CM

## 2020-10-07 RX ORDER — LEVOTHYROXINE SODIUM 112 UG/1
TABLET ORAL
Qty: 90 TABLET | Refills: 1 | Status: SHIPPED | OUTPATIENT
Start: 2020-10-07 | End: 2021-04-20

## 2020-10-27 ENCOUNTER — VIRTUAL VISIT (OUTPATIENT)
Dept: FAMILY MEDICINE | Facility: OTHER | Age: 60
End: 2020-10-27
Payer: COMMERCIAL

## 2020-10-27 DIAGNOSIS — B35.1 ONYCHOMYCOSIS: Primary | ICD-10-CM

## 2020-10-27 PROCEDURE — 99213 OFFICE O/P EST LOW 20 MIN: CPT | Mod: GT | Performed by: FAMILY MEDICINE

## 2020-10-27 RX ORDER — TERBINAFINE HYDROCHLORIDE 250 MG/1
250 TABLET ORAL DAILY
Qty: 42 TABLET | Refills: 0 | Status: SHIPPED | OUTPATIENT
Start: 2020-10-27 | End: 2020-12-08

## 2020-10-27 NOTE — PROGRESS NOTES
"Bel Cruz is a 60 year old female who is being evaluated via a billable video visit.      The patient has been notified of following:     \"This video visit will be conducted via a call between you and your physician/provider. We have found that certain health care needs can be provided without the need for an in-person physical exam.  This service lets us provide the care you need with a video conversation.  If a prescription is necessary we can send it directly to your pharmacy.  If lab work is needed we can place an order for that and you can then stop by our lab to have the test done at a later time.    Video visits are billed at different rates depending on your insurance coverage.  Please reach out to your insurance provider with any questions.    If during the course of the call the physician/provider feels a video visit is not appropriate, you will not be charged for this service.\"    Patient has given verbal consent for Video visit? Yes  How would you like to obtain your AVS? MyChart  If you are dropped from the video visit, the video invite should be resent to: Text to cell phone: [phone]  Will anyone else be joining your video visit? No    Subjective     Bel Cruz is a 60 year old female who presents today via video visit for the following health issues:    HPI     Concern - Fingernails Fungus   Onset: the last month or so   Description: removed artifical nails 6 months ago and has been growing our her nails since then. Recently the nails on her left hand are turning white.     Home BP readings 120s/70s.    Patient states that she thought her nails were growing out but after a while she noticed that the whiteness and lifting off of the nailbed was actually worsening.    Video Start Time: 3:19        Review of Systems   CONSTITUTIONAL: NEGATIVE for fever, chills, change in weight      Objective           Vitals:  No vitals were obtained today due to virtual visit.    Physical Exam "     GENERAL: Healthy, alert and no distress  EYES: Eyes grossly normal to inspection.  No discharge or erythema, or obvious scleral/conjunctival abnormalities.  RESP: No audible wheeze, cough, or visible cyanosis.  No visible retractions or increased work of breathing.    SKIN: Visible skin clear. No significant rash, abnormal pigmentation or lesions.  A few of her fingernails she shows me some white discoloration and that it is obvious that the nail is lifting off the nail bed.  NEURO: Cranial nerves grossly intact.  Mentation and speech appropriate for age.  PSYCH: Mentation appears normal, affect normal/bright, judgement and insight intact, normal speech and appearance well-groomed.              Assessment & Plan     Onychomycosis  We discussed using Lamisil for onychomycosis. This drug offers a fairly high but not universal cure rate. A 6 week treatment course is recommended. The patient is aware that rare cases of liver injury have been reported. The symptoms of liver disease have been discussed; call if such occurs.Other side effects, such as headaches and rashes, have also been discussed.    - terbinafine (LAMISIL) 250 MG tablet; Take 1 tablet (250 mg) by mouth daily          Return in about 6 months (around 4/27/2021) for Physical Exam.    Lala Flacon MD  [facility]      Video-Visit Details    Type of service:  Video Visit    Video End Time:3:31 PM    Originating Location (pt. Location): Home    Distant Location (provider location):  [facility]     Platform used for Video Visit: [facility]

## 2020-11-13 ENCOUNTER — MYC MEDICAL ADVICE (OUTPATIENT)
Dept: FAMILY MEDICINE | Facility: OTHER | Age: 60
End: 2020-11-13

## 2020-11-13 NOTE — TELEPHONE ENCOUNTER
Reason for call:  Symptom  Reason for call:  Patient reporting a symptom    Symptom or request: UTI      Additional comments: PT is being sent to triage    Phone Number patient can be reached at:  Cell number on file:    Telephone Information:   Mobile [phone]       Best Time:  anytime    Can we leave a detailed message on this number:  YES    Call taken on 11/13/2020 at 4:50 PM by Anabelle Kramer

## 2020-12-17 ASSESSMENT — ENCOUNTER SYMPTOMS
PALPITATIONS: 1
SHORTNESS OF BREATH: 1
CONSTIPATION: 0
FREQUENCY: 0
EYE PAIN: 1
BREAST MASS: 0
DIZZINESS: 0
HEMATURIA: 0
SORE THROAT: 0
DIARRHEA: 0
ARTHRALGIAS: 0
CHILLS: 0
PARESTHESIAS: 1
NERVOUS/ANXIOUS: 1
COUGH: 0
ABDOMINAL PAIN: 0
HEMATOCHEZIA: 0
HEADACHES: 1
WEAKNESS: 0
MYALGIAS: 0
FEVER: 0
DYSURIA: 0
JOINT SWELLING: 0
NAUSEA: 0
HEARTBURN: 1

## 2020-12-17 NOTE — PROGRESS NOTES
SUBJECTIVE:   CC: Bel Cruz is an 60 year old woman who presents for preventive health visit.  Patient has been advised of split billing requirements and indicates understanding: Yes  Healthy Habits:     Getting at least 3 servings of Calcium per day:  Yes    Bi-annual eye exam:  Yes    Dental care twice a year:  Yes    Sleep apnea or symptoms of sleep apnea:  None    Diet:  Regular (no restrictions)    Frequency of exercise:  2-3 days/week    Duration of exercise:  30-45 minutes    Taking medications regularly:  Yes    Medication side effects:  None    PHQ-2 Total Score: 0    Additional concerns today:  Yes (would like her cholesterol checked )     BP is up a little, knows that her anxiety makes her feel agitated.  She does have a blood pressure cuff at home but has not been checking and she endorses that this increases her anxiety.    Patient states that when she exercises she starts out having a burning in her chest and some chest pressure but she pushes through it.  She feels better the longer she has been exercising.  She is still able to hike 9 to 11 miles.  She tells me that she took omeprazole for a month and her symptoms resolved but when she stopped this the symptoms came back.  She states that when she bends over that she will get acid that comes into her mouth.    Patient paternal aunt and paternal grandmother both had Sjogren's syndrome.  Patient has really dry eyes and is wondering if she has it.  Patient had screening labs for rheumatoid factor and antinuclear antibodies that were mildly positive many years ago.  She is not seeing a rheumatologist.  She denies having joint pain.  She denies Raynaud's syndrome.    Today's PHQ-2 Score:   PHQ-2 ( 1999 Pfizer) 12/17/2020   Q1: Little interest or pleasure in doing things 0   Q2: Feeling down, depressed or hopeless 0   PHQ-2 Score 0   Q1: Little interest or pleasure in doing things Not at all   Q2: Feeling down, depressed or hopeless Not at all    PHQ-2 Score 0       Abuse: Current or Past (Physical, Sexual or Emotional) - No  Do you feel safe in your environment? Yes    Have you ever done Advance Care Planning? (For example, a Health Directive, POLST, or a discussion with a medical provider or your loved ones about your wishes): No, advance care planning information given to patient to review.  Patient plans to discuss their wishes with loved ones or provider.      Social History     Tobacco Use     Smoking status: Never Smoker     Smokeless tobacco: Never Used     Tobacco comment: no smokers in household   Substance Use Topics     Alcohol use: No     If you drink alcohol do you typically have >3 drinks per day or >7 drinks per week? No    No flowsheet data found.    Reviewed orders with patient.  Reviewed health maintenance and updated orders accordingly - Yes    Mammogram Screening: Patient over age 50, mutual decision to screen reflected in health maintenance.    Pertinent mammograms are reviewed under the imaging tab.  History of abnormal Pap smear: NO - age 30-65 PAP every 5 years with negative HPV co-testing recommended  PAP / HPV Latest Ref Rng & Units 5/3/2019 10/17/2014 8/31/2011   PAP - NIL NIL NIL   HPV 16 DNA NEG:Negative Negative - -   HPV 18 DNA NEG:Negative Negative - -   OTHER HR HPV NEG:Negative Negative - -     Reviewed and updated as needed this visit by clinical staff  Tobacco  Allergies  Meds  Problems  Med Hx  Surg Hx  Fam Hx          Reviewed and updated as needed this visit by Provider  Tobacco  Allergies  Meds  Problems  Med Hx  Surg Hx  Fam Hx             Review of Systems   Constitutional: Negative for chills and fever.   HENT: Negative for congestion, ear pain, hearing loss and sore throat.    Eyes: Positive for pain and visual disturbance.   Respiratory: Positive for shortness of breath. Negative for cough.    Cardiovascular: Positive for palpitations. Negative for peripheral edema.   Gastrointestinal: Positive  "for heartburn. Negative for abdominal pain, constipation, diarrhea, hematochezia and nausea.   Breasts:  Negative for tenderness, breast mass and discharge.   Genitourinary: Negative for dysuria, frequency, genital sores, hematuria, pelvic pain, urgency, vaginal bleeding and vaginal discharge.   Musculoskeletal: Negative for arthralgias, joint swelling and myalgias.   Skin: Negative for rash.   Neurological: Positive for headaches and paresthesias. Negative for dizziness and weakness.   Psychiatric/Behavioral: Negative for mood changes. The patient is nervous/anxious.         OBJECTIVE:   /88   Pulse 94   Temp 97.7  F (36.5  C) (Temporal)   Ht 1.74 m (5' 8.5\")   Wt 78.5 kg (173 lb)   LMP 07/18/2005   SpO2 99%   BMI 25.92 kg/m    Physical Exam  Constitutional:       General: She is not in acute distress.     Appearance: She is well-developed.   HENT:      Right Ear: Tympanic membrane and external ear normal.      Left Ear: Tympanic membrane and external ear normal.      Nose: Nose normal.      Mouth/Throat:      Pharynx: No oropharyngeal exudate.   Eyes:      General:         Right eye: No discharge.         Left eye: No discharge.      Conjunctiva/sclera: Conjunctivae normal.      Pupils: Pupils are equal, round, and reactive to light.   Neck:      Musculoskeletal: Neck supple.      Thyroid: No thyromegaly.      Trachea: No tracheal deviation.   Cardiovascular:      Rate and Rhythm: Normal rate and regular rhythm.      Pulses: Normal pulses.      Heart sounds: Normal heart sounds, S1 normal and S2 normal. No murmur. No friction rub. No S3 or S4 sounds.    Pulmonary:      Effort: Pulmonary effort is normal. No respiratory distress.      Breath sounds: Normal breath sounds. No wheezing or rales.   Chest:      Breasts:         Right: No mass, nipple discharge or tenderness.         Left: No mass, nipple discharge or tenderness.   Abdominal:      General: Bowel sounds are normal.      Palpations: Abdomen is " soft. There is no mass.      Tenderness: There is no abdominal tenderness.   Musculoskeletal: Normal range of motion.   Lymphadenopathy:      Cervical: No cervical adenopathy.   Skin:     General: Skin is warm and dry.      Findings: No rash.   Neurological:      Mental Status: She is alert and oriented to person, place, and time.      Motor: No abnormal muscle tone.      Deep Tendon Reflexes: Reflexes are normal and symmetric.   Psychiatric:         Thought Content: Thought content normal.         Judgment: Judgment normal.           ASSESSMENT/PLAN:   1. Routine general medical examination at a health care facility    - Lipid panel reflex to direct LDL Fasting    2. Hypothyroidism, unspecified type  Labs drawn.  Continue replacement.    - TSH with free T4 reflex    3. Generalized anxiety disorder  Patient did not do well on Paxil and tried in the past.  She is wondering if she can have something for severe anxiety such as in the middle of the night.  She states many years ago she had been on diazepam.  We discussed using hydroxyzine as needed.    - hydrOXYzine (VISTARIL) 50 MG capsule; Take 1 capsule (50 mg) by mouth 3 times daily as needed for anxiety  Dispense: 30 capsule; Refill: 1    4. Gastroesophageal reflux disease without esophagitis  Patient felt that her burning in her chest and chest pressure resolved when she was taking omeprazole and returned when she stopped.  We discussed long-term side effects of omeprazole versus the very real improvement of her symptoms.  At this point I recommend that she stay on the omeprazole.  When her symptoms resolve she could always then try to decrease it to every other day to see if this helps keep her symptoms at bay.  We discussed possible cardiovascular component because of her chest pressure however she is able to hike for 9 to 11 miles and her symptoms improve the longer she exercises.    - omeprazole (PRILOSEC) 20 MG DR capsule; Take 1 capsule (20 mg) by mouth  "daily  Dispense: 90 capsule; Refill: 3    5. Dry eyes  Patient has been following with her eye doctor for this but was unable to afford some of the eyedrops.  She is wondering about possible Sjogren's syndrome.  Will check screening labs and if positive would then refer to rheumatology.    - Rheumatoid factor  - Anti Nuclear Manuela IgG by IFA with Reflex  - Cyclic Citrullinated Peptide Antibody IgG    6. Family history of Sjogren's disease  As above    - Rheumatoid factor  - Anti Nuclear Manuela IgG by IFA with Reflex  - Cyclic Citrullinated Peptide Antibody IgG    Patient has been advised of split billing requirements and indicates understanding: Yes  COUNSELING:  Reviewed preventive health counseling, as reflected in patient instructions    Estimated body mass index is 25.92 kg/m  as calculated from the following:    Height as of this encounter: 1.74 m (5' 8.5\").    Weight as of this encounter: 78.5 kg (173 lb).    Weight management plan: Discussed healthy diet and exercise guidelines    She reports that she has never smoked. She has never used smokeless tobacco.      Counseling Resources:  ATP IV Guidelines  Pooled Cohorts Equation Calculator  Breast Cancer Risk Calculator  BRCA-Related Cancer Risk Assessment: FHS-7 Tool  FRAX Risk Assessment  ICSI Preventive Guidelines  Dietary Guidelines for Americans, 2010  USDA's MyPlate  ASA Prophylaxis  Lung CA Screening    Lala Falcon MD  [facility]  "

## 2020-12-18 ENCOUNTER — OFFICE VISIT (OUTPATIENT)
Dept: FAMILY MEDICINE | Facility: OTHER | Age: 60
End: 2020-12-18
Payer: COMMERCIAL

## 2020-12-18 VITALS
HEIGHT: 69 IN | HEART RATE: 94 BPM | DIASTOLIC BLOOD PRESSURE: 88 MMHG | WEIGHT: 173 LBS | BODY MASS INDEX: 25.62 KG/M2 | OXYGEN SATURATION: 99 % | SYSTOLIC BLOOD PRESSURE: 134 MMHG | TEMPERATURE: 97.7 F

## 2020-12-18 DIAGNOSIS — E03.9 HYPOTHYROIDISM, UNSPECIFIED TYPE: ICD-10-CM

## 2020-12-18 DIAGNOSIS — Z82.69 FAMILY HISTORY OF SJOGREN'S DISEASE: ICD-10-CM

## 2020-12-18 DIAGNOSIS — Z00.00 ROUTINE GENERAL MEDICAL EXAMINATION AT A HEALTH CARE FACILITY: Primary | ICD-10-CM

## 2020-12-18 DIAGNOSIS — F41.1 GENERALIZED ANXIETY DISORDER: ICD-10-CM

## 2020-12-18 DIAGNOSIS — K21.9 GASTROESOPHAGEAL REFLUX DISEASE WITHOUT ESOPHAGITIS: ICD-10-CM

## 2020-12-18 DIAGNOSIS — H04.123 DRY EYES: ICD-10-CM

## 2020-12-18 PROBLEM — M70.61 TROCHANTERIC BURSITIS OF RIGHT HIP: Status: RESOLVED | Noted: 2019-08-02 | Resolved: 2020-12-18

## 2020-12-18 PROBLEM — R03.0 ELEVATED BLOOD PRESSURE READING WITHOUT DIAGNOSIS OF HYPERTENSION: Status: ACTIVE | Noted: 2020-06-10

## 2020-12-18 LAB
CHOLEST SERPL-MCNC: 211 MG/DL
HDLC SERPL-MCNC: 65 MG/DL
LDLC SERPL CALC-MCNC: 105 MG/DL
NONHDLC SERPL-MCNC: 146 MG/DL
TRIGL SERPL-MCNC: 203 MG/DL
TSH SERPL DL<=0.005 MIU/L-ACNC: 0.63 MU/L (ref 0.4–4)

## 2020-12-18 PROCEDURE — 86039 ANTINUCLEAR ANTIBODIES (ANA): CPT | Performed by: FAMILY MEDICINE

## 2020-12-18 PROCEDURE — 99214 OFFICE O/P EST MOD 30 MIN: CPT | Mod: 25 | Performed by: FAMILY MEDICINE

## 2020-12-18 PROCEDURE — 84443 ASSAY THYROID STIM HORMONE: CPT | Performed by: FAMILY MEDICINE

## 2020-12-18 PROCEDURE — 80061 LIPID PANEL: CPT | Performed by: FAMILY MEDICINE

## 2020-12-18 PROCEDURE — 36415 COLL VENOUS BLD VENIPUNCTURE: CPT | Performed by: FAMILY MEDICINE

## 2020-12-18 PROCEDURE — 99396 PREV VISIT EST AGE 40-64: CPT | Performed by: FAMILY MEDICINE

## 2020-12-18 PROCEDURE — 86200 CCP ANTIBODY: CPT | Performed by: FAMILY MEDICINE

## 2020-12-18 PROCEDURE — 86431 RHEUMATOID FACTOR QUANT: CPT | Performed by: FAMILY MEDICINE

## 2020-12-18 PROCEDURE — 86038 ANTINUCLEAR ANTIBODIES: CPT | Performed by: FAMILY MEDICINE

## 2020-12-18 RX ORDER — HYDROXYZINE PAMOATE 50 MG/1
50 CAPSULE ORAL 3 TIMES DAILY PRN
Qty: 30 CAPSULE | Refills: 1 | Status: SHIPPED | OUTPATIENT
Start: 2020-12-18 | End: 2022-07-05

## 2020-12-18 ASSESSMENT — ENCOUNTER SYMPTOMS
WEAKNESS: 0
PALPITATIONS: 1
FEVER: 0
HEMATOCHEZIA: 0
SORE THROAT: 0
NERVOUS/ANXIOUS: 1
DIARRHEA: 0
NAUSEA: 0
DYSURIA: 0
COUGH: 0
ABDOMINAL PAIN: 0
EYE PAIN: 1
HEMATURIA: 0
PARESTHESIAS: 1
HEADACHES: 1
HEARTBURN: 1
JOINT SWELLING: 0
ARTHRALGIAS: 0
MYALGIAS: 0
SHORTNESS OF BREATH: 1
CONSTIPATION: 0
FREQUENCY: 0
BREAST MASS: 0
CHILLS: 0
DIZZINESS: 0

## 2020-12-18 ASSESSMENT — MIFFLIN-ST. JEOR: SCORE: 1406.22

## 2020-12-21 DIAGNOSIS — H04.123 DRY EYES: Primary | ICD-10-CM

## 2020-12-21 LAB
ANA PAT SER IF-IMP: ABNORMAL
ANA SER QL IF: ABNORMAL
ANA TITR SER IF: ABNORMAL {TITER}
RHEUMATOID FACT SER NEPH-ACNC: 79 IU/ML (ref 0–20)

## 2020-12-23 LAB — CCP AB SER IA-ACNC: 2 U/ML

## 2020-12-24 ENCOUNTER — MYC MEDICAL ADVICE (OUTPATIENT)
Dept: FAMILY MEDICINE | Facility: OTHER | Age: 60
End: 2020-12-24

## 2021-01-29 ENCOUNTER — TRANSFERRED RECORDS (OUTPATIENT)
Dept: HEALTH INFORMATION MANAGEMENT | Facility: CLINIC | Age: 61
End: 2021-01-29

## 2021-02-16 ENCOUNTER — TRANSFERRED RECORDS (OUTPATIENT)
Dept: HEALTH INFORMATION MANAGEMENT | Facility: CLINIC | Age: 61
End: 2021-02-16

## 2021-04-20 DIAGNOSIS — E03.9 HYPOTHYROIDISM, UNSPECIFIED TYPE: ICD-10-CM

## 2021-04-20 RX ORDER — LEVOTHYROXINE SODIUM 112 UG/1
TABLET ORAL
Qty: 90 TABLET | Refills: 2 | Status: SHIPPED | OUTPATIENT
Start: 2021-04-20 | End: 2022-01-26

## 2021-05-10 NOTE — PROGRESS NOTES
[address]  [facility] [address]  Phone: [phone]  Primary Provider: Lala Topete  Pre-op Performing Provider: LALA TOPETE      PREOPERATIVE EVALUATION:  Today's date: 5/11/2021    Bel Cruz is a 60 year old female who presents for a preoperative evaluation.    Surgical Information:  Surgery/Procedure: eye lid - lift   Surgery Location: [facility]   Surgeon: Edis  Surgery Date: 6/9/2021  Time of Surgery: TBD  Where patient plans to recover: At home with family      Type of Anesthesia Anticipated: conscious sedation      Assessment & Plan     The proposed surgical procedure is considered INTERMEDIATE risk.    Preop general physical exam    Ptosis of both eyelids      Hypothyroidism, unspecified type  Controlled on replacement    Gastroesophageal reflux disease without esophagitis  Controlled with proton pump inhibitor     Dysphagia, unspecified type  Trouble swallowing pills, no trouble swallowing food, has history of radiation to her neck.  Will refer for EGD.     - GASTROENTEROLOGY ADULT REF PROCEDURE ONLY; Future         Risks and Recommendations:  The patient has the following additional risks and recommendations for perioperative complications:   - No identified additional risk factors other than previously addressed    Medication Instructions:  Patient is to take all scheduled medications on the day of surgery    RECOMMENDATION:  APPROVAL GIVEN to proceed with proposed procedure, without further diagnostic evaluation.        Subjective     HPI related to upcoming procedure: upper eyelids drooping and affect vision      Preop Questions 5/11/2021   1. Have you ever had a heart attack or stroke? No   2. Have you ever had surgery on your heart or blood vessels, such as a stent placement, a coronary artery bypass, or surgery on an artery in your head, neck, heart, or legs? No   3. Do you have chest pain with activity?  No   4. Do you have a history of  heart failure? No   5. Do you currently have a cold, bronchitis or symptoms of other infection? No   6. Do you have a cough, shortness of breath, or wheezing? No   7. Do you or anyone in your family have previous history of blood clots? No   8. Do you or does anyone in your family have a serious bleeding problem such as prolonged bleeding following surgeries or cuts? No   9. Have you ever had problems with anemia or been told to take iron pills? No   10. Have you had any abnormal blood loss such as black, tarry or bloody stools, or abnormal vaginal bleeding? No   11. Have you ever had a blood transfusion? No   12. Are you willing to have a blood transfusion if it is medically needed before, during, or after your surgery? Yes   13. Have you or any of your relatives ever had problems with anesthesia? No   14. Do you have sleep apnea, excessive snoring or daytime drowsiness? No   15. Do you have any artifical heart valves or other implanted medical devices like a pacemaker, defibrillator, or continuous glucose monitor? No   16. Do you have artificial joints? No   17. Are you allergic to latex? No   18. Is there any chance that you may be pregnant? -     Health Care Directive:  Patient does not have a Health Care Directive or Living Will: Discussed advance care planning with patient; however, patient declined at this time.    Preoperative Review of :   reviewed - no record of controlled substances prescribed.      Status of Chronic Conditions:  HYPOTHYROIDISM - Patient has a longstanding history of chronic Hypothyroidism. Patient has been doing well, noting no tremor, insomnia, hair loss or changes in skin texture. Continues to take medications as directed, without adverse reactions or side effects. Last TSH   Lab Results   Component Value Date    TSH 0.63 12/18/2020   .        Review of Systems  CONSTITUTIONAL: NEGATIVE for fever, chills, change in weight  INTEGUMENTARY/SKIN:  NEGATIVE for worrisome rashes, moles or lesions  EYES: eyelids affecting vision  ENT/MOUTH: NEGATIVE for ear, mouth and throat problems  RESP: NEGATIVE for significant cough or shortness of breath   CV: NEGATIVE for chest pain, palpitations or peripheral edema  GI: NEGATIVE for nausea, abdominal pain, heartburn, or change in bowel habits  : NEGATIVE for frequency, dysuria, or hematuria  MUSCULOSKELETAL: NEGATIVE for significant arthralgias or myalgia  NEURO: NEGATIVE for weakness, dizziness or paresthesias  ENDOCRINE: NEGATIVE for temperature intolerance, skin/hair changes  HEME: NEGATIVE for bleeding problems  PSYCHIATRIC: NEGATIVE for changes in mood or affect    Patient Active Problem List    Diagnosis Date Noted     Gastroesophageal reflux disease without esophagitis 12/18/2020     Priority: Medium     Atrophic vaginitis 12/15/2015     Priority: Medium     Generalized anxiety disorder 06/27/2007     Priority: Medium     Hypothyroidism 08/02/2005     Priority: Medium      Past Medical History:   Diagnosis Date     Allergic rhinitis due to other allergen      Benign essential hypertension 6/10/2020     Esophageal reflux      GENERALIZED ANXIETY DIS 6/27/2007     Panic disorder without agoraphobia 1990     Trochanteric bursitis of right hip 8/2/2019     Unspecified hypothyroidism     Graves - s/p ablation     Past Surgical History:   Procedure Laterality Date     BIOPSY  1999    Breast biopsy     COLONOSCOPY  7/26/2011    Procedure:COMBINED COLONOSCOPY, REMOVE TUMOR/POLYP/LESION BY SNARE; single polyp removal; Surgeon:YUE LIMON; Location: GI     COLONOSCOPY Left 6/24/2016    Procedure: COMBINED COLONOSCOPY, SINGLE OR MULTIPLE BIOPSY/POLYPECTOMY BY BIOPSY;  Surgeon: Joseph Keyes MD;  Location:  OR     COLONOSCOPY WITH CO2 INSUFFLATION N/A 6/24/2016    Procedure: COLONOSCOPY WITH CO2 INSUFFLATION;  Surgeon: Joseph Keyes MD;  Location:  OR     ESOPHAGOSCOPY, GASTROSCOPY,  "DUODENOSCOPY (EGD), COMBINED N/A 5/8/2017    Procedure: COMBINED ESOPHAGOSCOPY, GASTROSCOPY, DUODENOSCOPY (EGD), BIOPSY SINGLE OR MULTIPLE;  ESOPHAGOSCOPY, GASTROSCOPY, DUODENOSCOPY (EGD) wiith multiple biopsies;  Surgeon: Chavez Land MD;  Location:  GI     EYE SURGERY  6/2/20    Cataract Surgery both eyes     GYN SURGERY  2005    Ablation of uterus.     HC HYSTEROSCOPY DIAGOSTIC (SEPARATE PROC)  8/10/2004    Hysteroscopy, D&C, Endometrial ablation     PELVIS LAPAROSCOPY,DX  1996    Pelvic pain - adhesions and mild endometriosis     US BREAST CLIP PLACEMENT W BIOPSY LEFT       Current Outpatient Medications   Medication Sig Dispense Refill     levothyroxine (SYNTHROID/LEVOTHROID) 112 MCG tablet TAKE 1 TABLET(112 MCG) BY MOUTH DAILY 90 tablet 2     Multiple Vitamins-Minerals (WOMENS MULTIVITAMIN PLUS PO) Take 1 tablet by mouth daily. Over 50        omeprazole (PRILOSEC) 20 MG DR capsule Take 1 capsule (20 mg) by mouth daily 90 capsule 3     hydrOXYzine (VISTARIL) 50 MG capsule Take 1 capsule (50 mg) by mouth 3 times daily as needed for anxiety (Patient not taking: Reported on 5/11/2021) 30 capsule 1       Allergies   Allergen Reactions     Amoxicillin      tightness in throat     Cephalosporins      ceftin     Clindamycin Base      Nausea, lightheadeness     Erythromycin      Welts     Macrolides      Sulfa Drugs Swelling     bactrim        Social History     Tobacco Use     Smoking status: Never Smoker     Smokeless tobacco: Never Used     Tobacco comment: no smokers in household   Substance Use Topics     Alcohol use: No       History   Drug Use No         Objective     /82   Pulse 80   Temp 98  F (36.7  C) (Temporal)   Ht 1.74 m (5' 8.5\")   Wt 78 kg (172 lb)   LMP 07/18/2005   SpO2 99%   BMI 25.77 kg/m      Physical Exam    GENERAL APPEARANCE: healthy, alert and no distress     EYES: EOMI, PERRL     HENT: ear canals and TM's normal and nose and mouth without ulcers or lesions     NECK: no " adenopathy, no asymmetry, masses, or scars and thyroid normal to palpation     RESP: lungs clear to auscultation - no rales, rhonchi or wheezes     CV: regular rates and rhythm, normal S1 S2, no S3 or S4 and no murmur, click or rub     ABDOMEN:  soft, nontender, no HSM or masses and bowel sounds normal     MS: extremities normal- no gross deformities noted, no evidence of inflammation in joints, FROM in all extremities.     SKIN: no suspicious lesions or rashes     NEURO: Normal strength and tone, sensory exam grossly normal, mentation intact and speech normal     PSYCH: mentation appears normal. and affect normal/bright     LYMPHATICS: No cervical adenopathy    Recent Labs   Lab Test 05/15/20  1533      POTASSIUM 4.2   CR 0.88        Diagnostics:  No labs were ordered during this visit.   No EKG required, no history of coronary heart disease, significant arrhythmia, peripheral arterial disease or other structural heart disease.    Revised Cardiac Risk Index (RCRI):  The patient has the following serious cardiovascular risks for perioperative complications:   - No serious cardiac risks = 0 points     RCRI Interpretation: 0 points: Class I (very low risk - 0.4% complication rate)           Signed Electronically by: Lala Falcon MD  Copy of this evaluation report is provided to requesting physician.

## 2021-05-11 ENCOUNTER — OFFICE VISIT (OUTPATIENT)
Dept: FAMILY MEDICINE | Facility: OTHER | Age: 61
End: 2021-05-11
Payer: COMMERCIAL

## 2021-05-11 VITALS
BODY MASS INDEX: 25.48 KG/M2 | OXYGEN SATURATION: 99 % | TEMPERATURE: 98 F | WEIGHT: 172 LBS | HEIGHT: 69 IN | DIASTOLIC BLOOD PRESSURE: 82 MMHG | SYSTOLIC BLOOD PRESSURE: 124 MMHG | HEART RATE: 80 BPM

## 2021-05-11 DIAGNOSIS — R13.10 DYSPHAGIA, UNSPECIFIED TYPE: ICD-10-CM

## 2021-05-11 DIAGNOSIS — E03.9 HYPOTHYROIDISM, UNSPECIFIED TYPE: ICD-10-CM

## 2021-05-11 DIAGNOSIS — H02.403 PTOSIS OF BOTH EYELIDS: ICD-10-CM

## 2021-05-11 DIAGNOSIS — Z01.818 PREOP GENERAL PHYSICAL EXAM: Primary | ICD-10-CM

## 2021-05-11 DIAGNOSIS — K21.9 GASTROESOPHAGEAL REFLUX DISEASE WITHOUT ESOPHAGITIS: ICD-10-CM

## 2021-05-11 PROBLEM — R03.0 ELEVATED BLOOD PRESSURE READING WITHOUT DIAGNOSIS OF HYPERTENSION: Status: RESOLVED | Noted: 2020-06-10 | Resolved: 2021-05-11

## 2021-05-11 PROCEDURE — 99214 OFFICE O/P EST MOD 30 MIN: CPT | Performed by: FAMILY MEDICINE

## 2021-05-11 ASSESSMENT — PAIN SCALES - GENERAL: PAINLEVEL: NO PAIN (0)

## 2021-05-11 ASSESSMENT — MIFFLIN-ST. JEOR: SCORE: 1401.69

## 2021-05-18 ENCOUNTER — TRANSFERRED RECORDS (OUTPATIENT)
Dept: HEALTH INFORMATION MANAGEMENT | Facility: CLINIC | Age: 61
End: 2021-05-18

## 2021-06-04 ENCOUNTER — MYC MEDICAL ADVICE (OUTPATIENT)
Dept: FAMILY MEDICINE | Facility: OTHER | Age: 61
End: 2021-06-04

## 2021-10-23 ENCOUNTER — HEALTH MAINTENANCE LETTER (OUTPATIENT)
Age: 61
End: 2021-10-23

## 2021-12-18 DIAGNOSIS — K21.9 GASTROESOPHAGEAL REFLUX DISEASE WITHOUT ESOPHAGITIS: ICD-10-CM

## 2022-01-03 ENCOUNTER — PRE VISIT (OUTPATIENT)
Dept: NEUROLOGY | Facility: CLINIC | Age: 62
End: 2022-01-03

## 2022-01-26 DIAGNOSIS — E03.9 HYPOTHYROIDISM, UNSPECIFIED TYPE: ICD-10-CM

## 2022-01-26 RX ORDER — LEVOTHYROXINE SODIUM 112 UG/1
TABLET ORAL
Qty: 30 TABLET | Refills: 0 | Status: SHIPPED | OUTPATIENT
Start: 2022-01-26 | End: 2022-02-18

## 2022-01-26 NOTE — TELEPHONE ENCOUNTER
Pending Prescriptions:                       Disp   Refills    levothyroxine (SYNTHROID/LEVOTHROID) 112 M*90 tab*2        Sig: TAKE 1 TABLET(112 MCG) BY MOUTH DAILY    Routing refill request to provider for review/approval because:  Labs not current:    TSH   Date Value Ref Range Status   12/18/2020 0.63 0.40 - 4.00 mU/L Final     Appointments in Next Year      Feb 18, 2022  3:00 PM  (Arrive by 2:40 PM)  Adult Preventative Visit with Lala Falcon MD  [facility] ([facility] ) [phone]

## 2022-02-18 ENCOUNTER — OFFICE VISIT (OUTPATIENT)
Dept: FAMILY MEDICINE | Facility: OTHER | Age: 62
End: 2022-02-18
Payer: COMMERCIAL

## 2022-02-18 VITALS
SYSTOLIC BLOOD PRESSURE: 122 MMHG | HEIGHT: 68 IN | WEIGHT: 166 LBS | HEART RATE: 88 BPM | RESPIRATION RATE: 18 BRPM | DIASTOLIC BLOOD PRESSURE: 70 MMHG | TEMPERATURE: 97.1 F | BODY MASS INDEX: 25.16 KG/M2 | OXYGEN SATURATION: 99 %

## 2022-02-18 DIAGNOSIS — Z00.00 ENCOUNTER FOR ROUTINE ADULT HEALTH EXAMINATION WITHOUT ABNORMAL FINDINGS: Primary | ICD-10-CM

## 2022-02-18 DIAGNOSIS — F41.1 GENERALIZED ANXIETY DISORDER: ICD-10-CM

## 2022-02-18 DIAGNOSIS — K21.9 GASTROESOPHAGEAL REFLUX DISEASE WITHOUT ESOPHAGITIS: ICD-10-CM

## 2022-02-18 DIAGNOSIS — R76.0 RAISED ANTIBODY TITER: ICD-10-CM

## 2022-02-18 DIAGNOSIS — E03.9 HYPOTHYROIDISM, UNSPECIFIED TYPE: ICD-10-CM

## 2022-02-18 LAB
AST SERPL W P-5'-P-CCNC: 13 U/L (ref 0–45)
CREAT SERPL-MCNC: 0.89 MG/DL (ref 0.52–1.04)
CRP SERPL-MCNC: <2.9 MG/L (ref 0–8)
ERYTHROCYTE [DISTWIDTH] IN BLOOD BY AUTOMATED COUNT: 13.2 % (ref 10–15)
GFR SERPL CREATININE-BSD FRML MDRD: 73 ML/MIN/1.73M2
HCT VFR BLD AUTO: 42.8 % (ref 35–47)
HGB BLD-MCNC: 14.1 G/DL (ref 11.7–15.7)
MCH RBC QN AUTO: 29.1 PG (ref 26.5–33)
MCHC RBC AUTO-ENTMCNC: 32.9 G/DL (ref 31.5–36.5)
MCV RBC AUTO: 88 FL (ref 78–100)
PLATELET # BLD AUTO: 220 10E3/UL (ref 150–450)
RBC # BLD AUTO: 4.85 10E6/UL (ref 3.8–5.2)
TSH SERPL DL<=0.005 MIU/L-ACNC: 0.81 MU/L (ref 0.4–4)
WBC # BLD AUTO: 4.4 10E3/UL (ref 4–11)

## 2022-02-18 PROCEDURE — 82565 ASSAY OF CREATININE: CPT | Performed by: FAMILY MEDICINE

## 2022-02-18 PROCEDURE — 85027 COMPLETE CBC AUTOMATED: CPT | Performed by: FAMILY MEDICINE

## 2022-02-18 PROCEDURE — 86140 C-REACTIVE PROTEIN: CPT | Performed by: FAMILY MEDICINE

## 2022-02-18 PROCEDURE — 86160 COMPLEMENT ANTIGEN: CPT | Mod: 59 | Performed by: FAMILY MEDICINE

## 2022-02-18 PROCEDURE — 86235 NUCLEAR ANTIGEN ANTIBODY: CPT | Performed by: FAMILY MEDICINE

## 2022-02-18 PROCEDURE — 99214 OFFICE O/P EST MOD 30 MIN: CPT | Mod: 25 | Performed by: FAMILY MEDICINE

## 2022-02-18 PROCEDURE — 84450 TRANSFERASE (AST) (SGOT): CPT | Performed by: FAMILY MEDICINE

## 2022-02-18 PROCEDURE — 84443 ASSAY THYROID STIM HORMONE: CPT | Performed by: FAMILY MEDICINE

## 2022-02-18 PROCEDURE — 99396 PREV VISIT EST AGE 40-64: CPT | Performed by: FAMILY MEDICINE

## 2022-02-18 PROCEDURE — 86235 NUCLEAR ANTIGEN ANTIBODY: CPT | Mod: 59 | Performed by: FAMILY MEDICINE

## 2022-02-18 PROCEDURE — 36415 COLL VENOUS BLD VENIPUNCTURE: CPT | Performed by: FAMILY MEDICINE

## 2022-02-18 RX ORDER — LEVOTHYROXINE SODIUM 112 UG/1
112 TABLET ORAL DAILY
Qty: 90 TABLET | Refills: 3 | Status: SHIPPED | OUTPATIENT
Start: 2022-02-18 | End: 2022-11-30

## 2022-02-18 ASSESSMENT — ENCOUNTER SYMPTOMS
BREAST MASS: 0
ABDOMINAL PAIN: 0
FEVER: 0
WEAKNESS: 0
DYSURIA: 0
DIARRHEA: 0
HEMATURIA: 0
SHORTNESS OF BREATH: 0
CHILLS: 0
NERVOUS/ANXIOUS: 1
PARESTHESIAS: 1
HEARTBURN: 1
COUGH: 0
HEADACHES: 0
SORE THROAT: 0
CONSTIPATION: 0
MYALGIAS: 0
DIZZINESS: 0
FREQUENCY: 0
NAUSEA: 0
PALPITATIONS: 0
ARTHRALGIAS: 0
EYE PAIN: 0
HEMATOCHEZIA: 0
JOINT SWELLING: 0

## 2022-02-18 ASSESSMENT — PAIN SCALES - GENERAL: PAINLEVEL: MILD PAIN (2)

## 2022-02-18 NOTE — PROGRESS NOTES
SUBJECTIVE:   CC: Bel Cruz is an 61 year old woman who presents for preventive health visit.   Patient has been advised of split billing requirements and indicates understanding: Yes  Healthy Habits:     Getting at least 3 servings of Calcium per day:  NO    Bi-annual eye exam:  Yes    Dental care twice a year:  Yes    Sleep apnea or symptoms of sleep apnea:  None    Diet:  Regular (no restrictions)    Frequency of exercise:  2-3 days/week    Duration of exercise:  45-60 minutes    Taking medications regularly:  Yes    Medication side effects:  Not applicable    PHQ-2 Total Score: 0    Additional concerns today:  Yes      Today's PHQ-2 Score:   PHQ-2 ( 1999 Pf[employer]) 2/18/2022   Q1: Little interest or pleasure in doing things 0   Q2: Feeling down, depressed or hopeless 0   PHQ-2 Score 0   PHQ-2 Total Score (12-17 Years)- Positive if 3 or more points; Administer PHQ-A if positive -   Q1: Little interest or pleasure in doing things Not at all   Q2: Feeling down, depressed or hopeless Not at all   PHQ-2 Score 0     Abuse: Current or Past (Physical, Sexual or Emotional) - No  Do you feel safe in your environment? Yes    Social History     Tobacco Use     Smoking status: Never Smoker     Smokeless tobacco: Never Used     Tobacco comment: no smokers in household   Substance Use Topics     Alcohol use: No     Alcohol Use 2/18/2022   Prescreen: >3 drinks/day or >7 drinks/week? Not Applicable   Prescreen: >3 drinks/day or >7 drinks/week? -       Reviewed orders with patient.  Reviewed health maintenance and updated orders accordingly - Yes    Breast Cancer Screening:    Breast CA Risk Assessment (FHS-7) 2/18/2022   Do you have a family history of breast, colon, or ovarian cancer? No / Unknown     Mammogram Screening: Recommended mammography every 1-2 years with patient discussion and risk factor consideration  Pertinent mammograms are reviewed under the imaging tab.    History of abnormal Pap smear: NO - age 30-65 PAP  "every 5 years with negative HPV co-testing recommended  PAP / HPV Latest Ref Rng & Units 5/3/2019 10/17/2014 8/31/2011   PAP (Historical) - NIL NIL NIL   HPV16 NEG:Negative Negative - -   HPV18 NEG:Negative Negative - -   HRHPV NEG:Negative Negative - -     Reviewed and updated as needed this visit by clinical staff   Tobacco  Allergies  Meds  Problems  Med Hx  Surg Hx  Fam Hx  Soc   Hx        Reviewed and updated as needed this visit by Provider   Tobacco  Allergies  Meds  Problems  Med Hx  Surg Hx  Fam Hx             Review of Systems   Constitutional: Negative for chills and fever.   HENT: Negative for congestion, ear pain, hearing loss and sore throat.    Eyes: Negative for pain and visual disturbance.   Respiratory: Negative for cough and shortness of breath.    Cardiovascular: Negative for chest pain, palpitations and peripheral edema.   Gastrointestinal: Positive for heartburn. Negative for abdominal pain, constipation, diarrhea, hematochezia and nausea.   Breasts:  Negative for tenderness, breast mass and discharge.   Genitourinary: Negative for dysuria, frequency, genital sores, hematuria, pelvic pain, urgency, vaginal bleeding and vaginal discharge.   Musculoskeletal: Negative for arthralgias, joint swelling and myalgias.   Skin: Negative for rash.   Neurological: Positive for paresthesias. Negative for dizziness, weakness and headaches.   Psychiatric/Behavioral: Negative for mood changes. The patient is nervous/anxious.      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     OBJECTIVE:   /70   Pulse 88   Temp 97.1  F (36.2  C) (Temporal)   Resp 18   Ht 1.73 m (5' 8.11\")   Wt 75.3 kg (166 lb)   LMP 07/18/2005   SpO2 99%   BMI 25.16 kg/m    Physical Exam  Constitutional:       General: She is not in acute distress.     Appearance: She is well-developed.   HENT:      Right Ear: Tympanic membrane and external ear " normal.      Left Ear: Tympanic membrane and external ear normal.      Nose: Nose normal.      Mouth/Throat:      Pharynx: No oropharyngeal exudate.   Eyes:      General:         Right eye: No discharge.         Left eye: No discharge.      Conjunctiva/sclera: Conjunctivae normal.      Pupils: Pupils are equal, round, and reactive to light.   Neck:      Thyroid: No thyromegaly.      Trachea: No tracheal deviation.   Cardiovascular:      Rate and Rhythm: Normal rate and regular rhythm.      Heart sounds: Normal heart sounds, S1 normal and S2 normal. No murmur heard.  Pulmonary:      Effort: Pulmonary effort is normal. No respiratory distress.      Breath sounds: Normal breath sounds. No wheezing or rales.   Abdominal:      General: Bowel sounds are normal.      Palpations: Abdomen is soft. There is no mass.      Tenderness: There is no abdominal tenderness.   Musculoskeletal:         General: Normal range of motion.      Cervical back: Neck supple.   Lymphadenopathy:      Cervical: No cervical adenopathy.   Skin:     General: Skin is warm and dry.      Findings: No rash.   Neurological:      Mental Status: She is alert and oriented to person, place, and time.      Deep Tendon Reflexes: Reflexes are normal and symmetric.   Psychiatric:         Thought Content: Thought content normal.         Judgment: Judgment normal.           ASSESSMENT/PLAN:   (Z00.00) Encounter for routine adult health examination without abnormal findings  (primary encounter diagnosis)    (E03.9) Hypothyroidism, unspecified type  Comment: Labs drawn.  Refills given.  Plan: TSH with free T4 reflex, levothyroxine         (SYNTHROID/LEVOTHROID) 112 MCG tablet,                 (K21.9) Gastroesophageal reflux disease without esophagitis  Comment: Tried decreasing omeprazole use but found her heartburn recurred.  Refills given.  Plan: omeprazole (PRILOSEC) 20 MG DR capsule                 (F41.1) Generalized anxiety disorder  Comment: Has not tried  "hydroxyzine yet.  She did have this filled and she may use it if she has increased anxiety in the future.  Otherwise she feels she is sleeping well using Tylenol PM.    (R76.0) Raised antibody titer  Comment: She saw a rheumatology last spring.  They did not feel she had a diagnosable autoimmune disorder at this time but wanted to monitor for possible evolving disorder.  She was supposed to follow-up in 6 months with labs.  She asked if she can have those labs drawn.  We discussed drawing these but if they are abnormal I would refer her back to rheumatology for further discussion.  Plan: AST, CBC with platelets, CRP, inflammation,         Creatinine, Complement C3, Complement C4, RNP         Antibody IgG, Centromere Antibody IgG,             Patient has been advised of split billing requirements and indicates understanding: Yes    COUNSELING:  Reviewed preventive health counseling, as reflected in patient instructions    Estimated body mass index is 25.16 kg/m  as calculated from the following:    Height as of this encounter: 1.73 m (5' 8.11\").    Weight as of this encounter: 75.3 kg (166 lb).    Weight management plan: Discussed healthy diet and exercise guidelines    She reports that she has never smoked. She has never used smokeless tobacco.      Counseling Resources:  ATP IV Guidelines  Pooled Cohorts Equation Calculator  Breast Cancer Risk Calculator  BRCA-Related Cancer Risk Assessment: FHS-7 Tool  FRAX Risk Assessment  ICSI Preventive Guidelines  Dietary Guidelines for Americans, 2010  USDA's MyPlate  ASA Prophylaxis  Lung CA Screening    Lala Falcon MD  [facility]"

## 2022-02-22 LAB
C3 SERPL-MCNC: 117 MG/DL (ref 81–157)
C4 SERPL-MCNC: 19 MG/DL (ref 13–39)
CENTROMERE B AB SER-ACNC: 24 U/ML
CENTROMERE B AB SER-ACNC: POSITIVE
U1 SNRNP IGG SER IA-ACNC: 1.8 U/ML
U1 SNRNP IGG SER IA-ACNC: NEGATIVE

## 2022-06-03 ENCOUNTER — MYC MEDICAL ADVICE (OUTPATIENT)
Dept: FAMILY MEDICINE | Facility: OTHER | Age: 62
End: 2022-06-03
Payer: COMMERCIAL

## 2022-07-05 ENCOUNTER — OFFICE VISIT (OUTPATIENT)
Dept: FAMILY MEDICINE | Facility: OTHER | Age: 62
End: 2022-07-05
Payer: COMMERCIAL

## 2022-07-05 VITALS
OXYGEN SATURATION: 97 % | SYSTOLIC BLOOD PRESSURE: 116 MMHG | WEIGHT: 166 LBS | RESPIRATION RATE: 16 BRPM | TEMPERATURE: 97 F | DIASTOLIC BLOOD PRESSURE: 80 MMHG | HEART RATE: 94 BPM | BODY MASS INDEX: 25.16 KG/M2

## 2022-07-05 DIAGNOSIS — R20.2 NUMBNESS AND TINGLING OF BOTH LOWER EXTREMITIES: ICD-10-CM

## 2022-07-05 DIAGNOSIS — F41.1 GENERALIZED ANXIETY DISORDER: ICD-10-CM

## 2022-07-05 DIAGNOSIS — E03.9 HYPOTHYROIDISM, UNSPECIFIED TYPE: ICD-10-CM

## 2022-07-05 DIAGNOSIS — R20.0 NUMBNESS AND TINGLING OF BOTH LOWER EXTREMITIES: ICD-10-CM

## 2022-07-05 DIAGNOSIS — R20.2 NUMBNESS AND TINGLING IN BOTH HANDS: Primary | ICD-10-CM

## 2022-07-05 DIAGNOSIS — R20.0 NUMBNESS AND TINGLING IN BOTH HANDS: Primary | ICD-10-CM

## 2022-07-05 LAB
CHOLEST SERPL-MCNC: 210 MG/DL
ERYTHROCYTE [DISTWIDTH] IN BLOOD BY AUTOMATED COUNT: 13 % (ref 10–15)
FASTING STATUS PATIENT QL REPORTED: NO
FASTING STATUS PATIENT QL REPORTED: NO
GLUCOSE BLD-MCNC: 98 MG/DL (ref 70–99)
HCT VFR BLD AUTO: 43.6 % (ref 35–47)
HDLC SERPL-MCNC: 76 MG/DL
HGB BLD-MCNC: 14.6 G/DL (ref 11.7–15.7)
LDLC SERPL CALC-MCNC: 102 MG/DL
MCH RBC QN AUTO: 29.4 PG (ref 26.5–33)
MCHC RBC AUTO-ENTMCNC: 33.5 G/DL (ref 31.5–36.5)
MCV RBC AUTO: 88 FL (ref 78–100)
NONHDLC SERPL-MCNC: 134 MG/DL
PLATELET # BLD AUTO: 196 10E3/UL (ref 150–450)
RBC # BLD AUTO: 4.96 10E6/UL (ref 3.8–5.2)
TRIGL SERPL-MCNC: 162 MG/DL
TSH SERPL DL<=0.005 MIU/L-ACNC: 0.68 MU/L (ref 0.4–4)
VIT B12 SERPL-MCNC: 385 PG/ML (ref 232–1245)
WBC # BLD AUTO: 4.5 10E3/UL (ref 4–11)

## 2022-07-05 PROCEDURE — 82947 ASSAY GLUCOSE BLOOD QUANT: CPT | Performed by: FAMILY MEDICINE

## 2022-07-05 PROCEDURE — 84443 ASSAY THYROID STIM HORMONE: CPT | Performed by: FAMILY MEDICINE

## 2022-07-05 PROCEDURE — 99214 OFFICE O/P EST MOD 30 MIN: CPT | Performed by: FAMILY MEDICINE

## 2022-07-05 PROCEDURE — 82607 VITAMIN B-12: CPT | Performed by: FAMILY MEDICINE

## 2022-07-05 PROCEDURE — 36415 COLL VENOUS BLD VENIPUNCTURE: CPT | Performed by: FAMILY MEDICINE

## 2022-07-05 PROCEDURE — 80061 LIPID PANEL: CPT | Performed by: FAMILY MEDICINE

## 2022-07-05 PROCEDURE — 85027 COMPLETE CBC AUTOMATED: CPT | Performed by: FAMILY MEDICINE

## 2022-07-05 RX ORDER — PAROXETINE 10 MG/1
5 TABLET, FILM COATED ORAL EVERY MORNING
Qty: 30 TABLET | Refills: 1 | Status: SHIPPED | OUTPATIENT
Start: 2022-07-05 | End: 2022-08-30

## 2022-07-05 ASSESSMENT — PAIN SCALES - GENERAL: PAINLEVEL: NO PAIN (0)

## 2022-07-05 NOTE — PROGRESS NOTES
Assessment & Plan     Numbness and tingling in both hands/Numbness and tingling of both lower extremities  Suspect carpal tunnel syndrome in her left hand, but this doesn't explain the parasthesias and neuropathic symptoms in her feet and right hand.  Will draw labs and obtain EMG.  Consider trying capsaicin cream on her feet.  - EMG; Future  - Lipid panel reflex to direct LDL Non-fasting  - Glucose  - Vitamin B12  - CBC with platelets    Generalized anxiety disorder  She would like to try Paxil again at a low dose, she found it very effective for anxiety, but it caused diarrhea, she has taken it for over 10 years.  Considered trying Paxil CR, but it is more expensive, will retry generic Paxil at 5 mg daily.    - PARoxetine (PAXIL) 10 MG tablet; Take 0.5 tablets (5 mg) by mouth every morning    Hypothyroidism, unspecified type  Labs drawn.  - TSH with free T4 reflex    Lala Falcon MD  [facility] ABBE Sanchez is a 61 year old, presenting for the following health issues:  Numbness      History of Present Illness       Reason for visit:  Numbness and hot feet at night with cramps.  Prickly and achey sensation in hands and feet.  Symptom onset:  More than a month  Symptoms include:  Prickly tingly sensation spread from my feet to hands and body. Itchy skin and pain in toes snd fingers.  Symptom intensity:  Moderate  Symptom progression:  Worsening  Had these symptoms before:  No  What makes it worse:  Laying down at night my feet can start to get really hot  What makes it better:  Symptoms come and go.  Each day is diffetent from very annoying problems to very mild.    She eats 2-3 servings of fruits and vegetables daily.She consumes 0 sweetened beverage(s) daily.She exercises with enough effort to increase her heart rate 30 to 60 minutes per day.  She exercises with enough effort to increase her heart rate 4 days per week.   She is taking medications regularly.     In  December 2020 noticed some mild numbness in the bottom of her feet, stayed about the same until April 2022.  Started having burning in her feet at night, looked at her feet, saw cracking between her toes, thought she might have Athlete's foot and used OTC ointment, the skin healed up and the burning went away.  Then the burning and hot feeling kept coming back intermittently.  Then had intermittent aching in the arch of her feet.  Then started getting hot pricks in her feet and now in her hands.  Then getting numbness in her left hand and itching, now wakes up with both hands feeling numb.  Worse at night in her left hand and forearm.          Objective    /80   Pulse 94   Temp 97  F (36.1  C) (Temporal)   Resp 16   Wt 75.3 kg (166 lb)   LMP 07/18/2005   SpO2 97%   BMI 25.16 kg/m    Body mass index is 25.16 kg/m .  Physical Exam   Gen: no apparent distress  NECK: no adenopathy, no asymmetry, no masses  Neuro:  Positive Tinel's and Phalen's on the left.  No thenar atrophy.  Sensation and strength grossly intact.  Psych: Alert and oriented times 3; coherent speech, normal   rate and volume, able to articulate logical thoughts, able   to abstract reason, no tangential thoughts, no hallucinations   or delusions  Her affect is neutral            .  ..

## 2022-07-07 ENCOUNTER — ANCILLARY PROCEDURE (OUTPATIENT)
Dept: MAMMOGRAPHY | Facility: OTHER | Age: 62
End: 2022-07-07
Attending: FAMILY MEDICINE
Payer: COMMERCIAL

## 2022-07-07 DIAGNOSIS — Z12.31 VISIT FOR SCREENING MAMMOGRAM: ICD-10-CM

## 2022-07-07 PROCEDURE — 77063 BREAST TOMOSYNTHESIS BI: CPT | Mod: TC | Performed by: RADIOLOGY

## 2022-07-07 PROCEDURE — 77067 SCR MAMMO BI INCL CAD: CPT | Mod: TC | Performed by: RADIOLOGY

## 2022-07-08 ENCOUNTER — OFFICE VISIT (OUTPATIENT)
Dept: NEUROLOGY | Facility: CLINIC | Age: 62
End: 2022-07-08
Attending: FAMILY MEDICINE
Payer: COMMERCIAL

## 2022-07-08 DIAGNOSIS — R20.2 NUMBNESS AND TINGLING OF BOTH LOWER EXTREMITIES: Primary | ICD-10-CM

## 2022-07-08 DIAGNOSIS — R20.0 NUMBNESS AND TINGLING OF BOTH LOWER EXTREMITIES: Primary | ICD-10-CM

## 2022-07-08 DIAGNOSIS — R20.2 LEFT HAND PARESTHESIA: ICD-10-CM

## 2022-07-08 PROCEDURE — 95913 NRV CNDJ TEST 13/> STUDIES: CPT | Performed by: PHYSICAL MEDICINE & REHABILITATION

## 2022-07-08 PROCEDURE — 95885 MUSC TST DONE W/NERV TST LIM: CPT | Mod: 59 | Performed by: PHYSICAL MEDICINE & REHABILITATION

## 2022-07-08 PROCEDURE — 95886 MUSC TEST DONE W/N TEST COMP: CPT | Performed by: PHYSICAL MEDICINE & REHABILITATION

## 2022-07-08 NOTE — PROGRESS NOTES
[facility]  Electrodiagnostic Laboratory                 Department of Neurology                                                                                                         Test Date:  2022    Patient: Laura Cruz : 1960 Physician: Steffany Moser MD   Sex: Female AGE: 61 year Ref Phys: Lala Falcon MD   ID#: [id number]   Technician: LEIA Pritchard     Clinical Information:  Bel Cruz is a 62 yo female who presents with paresthesia in her left hand. Denied any neck pain or radicular symptoms at bilateral upper extremities. She also has numbness at the plantar aspect of both feet with no back pain or radicular symptoms in bilateral lower extremities. She had right hip pain about 3-4 years ago which was associated with pain and numbness in RLE. Her hip pain resolved but she has some residual in her right third toe since then. Query peripheral mononeuropathy at [facility] [place] and polyneuropathy.     Techniques:  Motor and sensory conduction studies were done with surface recording electrodes. EMG was done with a concentric needle electrode.     Results:  Evaluation of the right Tibial (AHB) motor nerve showed reduced amplitude (3.9 mV).  The left sural sensory and the right sural sensory nerves showed no response.  All remaining nerves (as indicated in the following tables) were within normal limits.      Needle evaluation of the right Gastrocnemius muscle showed poor activation vs questionable reduced recruitment.  All remaining muscles (as indicated in the following table) showed no evidence of electrical instability.        Interpretation:  Abnormal study.     --There is electrodiagnostic evidence suggestive of a mild sensorimotor, length dependent, predominantly axonal polyneuropathy.    --There is no electrodiagnostic evidence of median or ulnar mononeuropathy in either upper extremity.    --There is no electrodiagnostic evidence of right upper or  right lower extremity radiculopathy.    ___________________________  Steffany Moser MD        Nerve Conduction Studies  Motor Sites      Latency Amplitude Neg. Amp Diff Segment Distance Velocity Neg. Dur Neg Area Diff Temperature Comment   Site (ms) Norm (mV) Norm %  cm m/s Norm ms %  C    Left Fibular (EDB) Motor   Ankle 5.7  < 6.0 4.5  > 2.0  Ankle-EDB 8   7.0  31.6    Bel Fib Head 13.1 - 4.5 - 0 Bel Fib Head-Ankle 30.5 41  > 38 7.7 7.2 31.6    Pop Fossa 15.5 - 4.3 - -4.4 Pop Fossa-Bel Fib Head 10 42  > 38 7.5 -2.2 31.6    Left Median (APB) Motor   Wrist 3.1  < 4.4 9.8  > 5.0  Wrist-APB 8   5.6  32.8    Elbow 7.3 - 9.5 - -3.1 Elbow-Wrist 23 55  > 48 5.7 -4.7 32.7    Right Median (APB) Motor   Wrist 3.0  < 4.4 7.9  > 5.0  Wrist-APB 8   5.7  32.8    Elbow 7.4 - 7.6 - -3.8 Elbow-Wrist 26 59  > 48 5.9 -3.6 32.7    Left Tibial (AHB) Motor   Ankle 5.3  < 6.5 4.9  > 4.4  Ankle-AHB 8   7.5  31.8    Knee 14.2 - 3.1 - -36.7 Knee-Ankle 40 45  > 38 9.7 -11.9 31.8    Right Tibial (AHB) Motor   Ankle 5.4  < 6.5 3.9  > 4.4  Ankle-AHB 8   7.3  31.7    Left Ulnar (ADM) Motor   Wrist 3.3  < 3.5 7.0  > 5.0  Wrist-ADM 8   6.6  32.8    Bel Elbow 6.9 - 6.0 - -14.3 Bel Elbow-Wrist 21.5 60  > 48 6.9 -12.0 32.8    Abv Elbow 8.4 - 5.9 - -1.67 Abv Elbow-Bel Elbow 9 60  > 48 7.6 3.8 32.8    Right Ulnar (ADM) Motor   Wrist 3.0  < 3.5 8.8  > 5.0  Wrist-ADM 8   5.8  32.7    Bel Elbow 6.7 - 8.7 - -1.14 Bel Elbow-Wrist 21 57  > 48 6.1 -2.9 32.7    Abv Elbow 8.5 - 7.9 - -9.2 Abv Elbow-Bel Elbow 10 56  > 48 6.2 -8.4 32.7      Sensory Sites      Onset Lat Peak Lat Amp (O-P) Amp (P-P) Segment Distance Velocity Temperature Comment   Site ms ms  V Norm  V  cm m/s Norm  C    Left Median Sensory   Wrist-Dig II 2.7 3.5 36  > 10 43 Wrist-Dig II 14 52  > 48 32.8    Right Median Sensory   Wrist-Dig II 2.4 3.3 46  > 10 76 Wrist-Dig II 14 58  > 48 32.7    Left Median (Ortho) Sensory   Palm-Wrist 1.33 2.0 21 - 38 Palm-Wrist 8 60 - 32.8    Right Median (Ortho)  Sensory   Palm-Wrist 1.48 1.95 15 - 26 Palm-Wrist 8 54 - 32.7    Left Median-Ulnar Palmar Sensory        Median   Palm-Wrist 1.33 2.0 21 - 38 Palm-Wrist 8 60 - 32.8         Ulnar   Palm-Wrist 1.15 1.73 19 - 38 Palm-Wrist 8 70 - 32.8    Right Median-Ulnar Palmar Sensory        Median   Palm-Wrist 1.48 1.95 15 - 26 Palm-Wrist 8 54 - 32.7         Ulnar   Palm-Wrist 1.40 1.93 7 - 16 Palm-Wrist 8 57 - 32.8    Left Sural Sensory   Calf-Lat Mall NR NR NR  > 5 NR Calf-Lat Mall 14 NR  > 38 31.8    Right Sural Sensory   Calf-Lat Mall NR NR NR  > 5 NR Calf-Lat Mall 14 NR  > 38 31.8    Left Ulnar Sensory   Wrist-Dig V 2.5 3.5 36  > 8 52 Wrist-Dig V 12.5 50  > 48 32.8    Right Ulnar Sensory   Wrist-Dig V 2.4 3.3 35  > 8 33 Wrist-Dig V 12.5 52  > 48 32.8    Left Ulnar (Ortho) Sensory   Palm-Wrist 1.15 1.73 19 - 38 Palm-Wrist 8 70 - 32.8    Right Ulnar (Ortho) Sensory   Palm-Wrist 1.40 1.93 7 - 16 Palm-Wrist 8 57 - 32.8      Inter-Nerve Comparisons     Nerve 1 Value 1 Nerve 2 Value 2 Parameter Result Normal   Sensory Sites   R Median Palm-Wrist 2.0 ms R Ulnar Palm-Wrist 1.9 ms Peak Lat Diff 0.02 ms <0.40   L Median Palm-Wrist 2.0 ms L Ulnar Palm-Wrist 1.7 ms Peak Lat Diff 0.27 ms <0.40       Electromyography     Side Muscle Ins Act Fibs/PSW Fasc HF Amp Dur Poly Recrt Int Pat   Right Tib Anterior Nml None Nml 0 Nml Nml 0 Nml Nml   Right Gastroc Nml None Nml 0 Nml Nml 0 Orion/poor activation  Nml   Right Vastus Lat Nml None Nml 0 Nml Nml 0 Nml Nml   Left Tib Anterior Nml None Nml 0 Nml Nml 0 Nml Nml   Left Gastroc Nml None Nml 0 Nml Nml 0 Nml Nml   Left Vastus Lat Nml None Nml 0 Nml Nml 0 Nml Nml   Right Biceps Fem SH Nml None Nml 0 Nml Nml 0 Nml Nml   Right Gluteus Med Nml None Nml 0 Nml Nml 0 Nml Nml         NCS Waveforms:    Motor                         Sensory

## 2022-07-08 NOTE — LETTER
2022     RE: Bel Cruz  [address]  [facility] [address]     Dear Colleague,    Thank you for referring your patient, Bel Cruz, to the [facility] EMG CLIN[facility] [place] at [facility]. Please see a copy of my visit note below.                            [facility]  Electrodiagnostic Laboratory                 Department of Neurology                                                                                                         Test Date:  2022    Patient: Laura Cruz : 1960 Physician: Steffany Moser MD   Sex: Female AGE: 61 year Ref Phys: Lala Falcon MD   ID#: [id number]   Technician: LEIA Pritchard     Clinical Information:  Bel Cruz is a 62 yo female who presents with paresthesia in her left hand. Denied any neck pain or radicular symptoms at bilateral upper extremities. She also has numbness at the plantar aspect of both feet with no back pain or radicular symptoms in bilateral lower extremities. She had right hip pain about 3-4 years ago which was associated with pain and numbness in RLE. Her hip pain resolved but she has some residual in her right third toe since then. Query peripheral mononeuropathy at [facility] [place] and polyneuropathy.     Techniques:  Motor and sensory conduction studies were done with surface recording electrodes. EMG was done with a concentric needle electrode.     Results:  Evaluation of the right Tibial (AHB) motor nerve showed reduced amplitude (3.9 mV).  The left sural sensory and the right sural sensory nerves showed no response.  All remaining nerves (as indicated in the following tables) were within normal limits.      Needle evaluation of the right Gastrocnemius muscle showed poor activation vs questionable reduced recruitment.  All remaining muscles (as indicated in the following table) showed no evidence of electrical instability.        Interpretation:  Abnormal  study.     --There is electrodiagnostic evidence suggestive of a mild sensorimotor, length dependent, predominantly axonal polyneuropathy.    --There is no electrodiagnostic evidence of median or ulnar mononeuropathy in either upper extremity.    --There is no electrodiagnostic evidence of right upper or right lower extremity radiculopathy.    ___________________________  Steffany Moser MD        Nerve Conduction Studies  Motor Sites      Latency Amplitude Neg. Amp Diff Segment Distance Velocity Neg. Dur Neg Area Diff Temperature Comment   Site (ms) Norm (mV) Norm %  cm m/s Norm ms %  C    Left Fibular (EDB) Motor   Ankle 5.7  < 6.0 4.5  > 2.0  Ankle-EDB 8   7.0  31.6    Bel Fib Head 13.1 - 4.5 - 0 Bel Fib Head-Ankle 30.5 41  > 38 7.7 7.2 31.6    Pop Fossa 15.5 - 4.3 - -4.4 Pop Fossa-Bel Fib Head 10 42  > 38 7.5 -2.2 31.6    Left Median (APB) Motor   Wrist 3.1  < 4.4 9.8  > 5.0  Wrist-APB 8   5.6  32.8    Elbow 7.3 - 9.5 - -3.1 Elbow-Wrist 23 55  > 48 5.7 -4.7 32.7    Right Median (APB) Motor   Wrist 3.0  < 4.4 7.9  > 5.0  Wrist-APB 8   5.7  32.8    Elbow 7.4 - 7.6 - -3.8 Elbow-Wrist 26 59  > 48 5.9 -3.6 32.7    Left Tibial (AHB) Motor   Ankle 5.3  < 6.5 4.9  > 4.4  Ankle-AHB 8   7.5  31.8    Knee 14.2 - 3.1 - -36.7 Knee-Ankle 40 45  > 38 9.7 -11.9 31.8    Right Tibial (AHB) Motor   Ankle 5.4  < 6.5 3.9  > 4.4  Ankle-AHB 8   7.3  31.7    Left Ulnar (ADM) Motor   Wrist 3.3  < 3.5 7.0  > 5.0  Wrist-ADM 8   6.6  32.8    Bel Elbow 6.9 - 6.0 - -14.3 Bel Elbow-Wrist 21.5 60  > 48 6.9 -12.0 32.8    Abv Elbow 8.4 - 5.9 - -1.67 Abv Elbow-Bel Elbow 9 60  > 48 7.6 3.8 32.8    Right Ulnar (ADM) Motor   Wrist 3.0  < 3.5 8.8  > 5.0  Wrist-ADM 8   5.8  32.7    Bel Elbow 6.7 - 8.7 - -1.14 Bel Elbow-Wrist 21 57  > 48 6.1 -2.9 32.7    Abv Elbow 8.5 - 7.9 - -9.2 Abv Elbow-Bel Elbow 10 56  > 48 6.2 -8.4 32.7      Sensory Sites      Onset Lat Peak Lat Amp (O-P) Amp (P-P) Segment Distance Velocity Temperature Comment   Site ms ms  V Norm   V  cm m/s Norm  C    Left Median Sensory   Wrist-Dig II 2.7 3.5 36  > 10 43 Wrist-Dig II 14 52  > 48 32.8    Right Median Sensory   Wrist-Dig II 2.4 3.3 46  > 10 76 Wrist-Dig II 14 58  > 48 32.7    Left Median (Ortho) Sensory   Palm-Wrist 1.33 2.0 21 - 38 Palm-Wrist 8 60 - 32.8    Right Median (Ortho) Sensory   Palm-Wrist 1.48 1.95 15 - 26 Palm-Wrist 8 54 - 32.7    Left Median-Ulnar Palmar Sensory        Median   Palm-Wrist 1.33 2.0 21 - 38 Palm-Wrist 8 60 - 32.8         Ulnar   Palm-Wrist 1.15 1.73 19 - 38 Palm-Wrist 8 70 - 32.8    Right Median-Ulnar Palmar Sensory        Median   Palm-Wrist 1.48 1.95 15 - 26 Palm-Wrist 8 54 - 32.7         Ulnar   Palm-Wrist 1.40 1.93 7 - 16 Palm-Wrist 8 57 - 32.8    Left Sural Sensory   Calf-Lat Mall NR NR NR  > 5 NR Calf-Lat Mall 14 NR  > 38 31.8    Right Sural Sensory   Calf-Lat Mall NR NR NR  > 5 NR Calf-Lat Mall 14 NR  > 38 31.8    Left Ulnar Sensory   Wrist-Dig V 2.5 3.5 36  > 8 52 Wrist-Dig V 12.5 50  > 48 32.8    Right Ulnar Sensory   Wrist-Dig V 2.4 3.3 35  > 8 33 Wrist-Dig V 12.5 52  > 48 32.8    Left Ulnar (Ortho) Sensory   Palm-Wrist 1.15 1.73 19 - 38 Palm-Wrist 8 70 - 32.8    Right Ulnar (Ortho) Sensory   Palm-Wrist 1.40 1.93 7 - 16 Palm-Wrist 8 57 - 32.8      Inter-Nerve Comparisons     Nerve 1 Value 1 Nerve 2 Value 2 Parameter Result Normal   Sensory Sites   R Median Palm-Wrist 2.0 ms R Ulnar Palm-Wrist 1.9 ms Peak Lat Diff 0.02 ms <0.40   L Median Palm-Wrist 2.0 ms L Ulnar Palm-Wrist 1.7 ms Peak Lat Diff 0.27 ms <0.40       Electromyography     Side Muscle Ins Act Fibs/PSW Fasc HF Amp Dur Poly Recrt Int Pat   Right Tib Anterior Nml None Nml 0 Nml Nml 0 Nml Nml   Right Gastroc Nml None Nml 0 Nml Nml 0 Orion/poor activation  Nml   Right Vastus Lat Nml None Nml 0 Nml Nml 0 Nml Nml   Left Tib Anterior Nml None Nml 0 Nml Nml 0 Nml Nml   Left Gastroc Nml None Nml 0 Nml Nml 0 Nml Nml   Left Vastus Lat Nml None Nml 0 Nml Nml 0 Nml Nml   Right Biceps Fem SH Nml None Nml 0  Nml Nml 0 Nml Nml   Right Gluteus Med Nml None Nml 0 Nml Nml 0 Nml Nml         NCS Waveforms:    Motor                         Sensory                                        Again, thank you for allowing me to participate in the care of your patient.      Sincerely,    Steffany Moser MD

## 2022-07-15 ENCOUNTER — MYC MEDICAL ADVICE (OUTPATIENT)
Dept: FAMILY MEDICINE | Facility: OTHER | Age: 62
End: 2022-07-15

## 2022-07-15 DIAGNOSIS — G62.9 AXONAL POLYNEUROPATHY: Primary | ICD-10-CM

## 2022-07-15 NOTE — TELEPHONE ENCOUNTER
See [employer] message.     Patient was seen by Neurology on 7/8/22    ANGELIC Sparks, RN  [facility]uel[facility]  July 15, 2022

## 2022-08-18 ENCOUNTER — TRANSFERRED RECORDS (OUTPATIENT)
Dept: FAMILY MEDICINE | Facility: OTHER | Age: 62
End: 2022-08-18

## 2022-08-18 LAB
AST SERPL-CCNC: 16 U/L (ref 5–34)
CREATININE (EXTERNAL): 0.78 MG/DL (ref 0.5–1.3)
GFR ESTIMATED (EXTERNAL): 76.5 ML/MIN/1.73M2

## 2022-08-19 ENCOUNTER — TRANSFERRED RECORDS (OUTPATIENT)
Dept: HEALTH INFORMATION MANAGEMENT | Facility: CLINIC | Age: 62
End: 2022-08-19

## 2022-08-22 ENCOUNTER — TRANSFERRED RECORDS (OUTPATIENT)
Dept: HEALTH INFORMATION MANAGEMENT | Facility: CLINIC | Age: 62
End: 2022-08-22

## 2022-08-26 DIAGNOSIS — F41.1 GENERALIZED ANXIETY DISORDER: ICD-10-CM

## 2022-08-30 RX ORDER — PAROXETINE 10 MG/1
TABLET, FILM COATED ORAL
Qty: 30 TABLET | Refills: 3 | Status: SHIPPED | OUTPATIENT
Start: 2022-08-30 | End: 2023-01-03

## 2022-10-06 NOTE — TELEPHONE ENCOUNTER
RECORDS RECEIVED FROM: Internal   REASON FOR VISIT: Axonal polyneuropathy   Date of Appt: 01/03/2023   NOTES (FOR ALL VISITS) STATUS DETAILS   OFFICE NOTE from referring provider Internal 07/15/2022 Dr Ezekiel BRAY my chart message   OFFICE NOTE from other specialist N/A    DISCHARGE SUMMARY from hospital N/A    DISCHARGE REPORT from the ER N/A    OPERATIVE REPORT N/A    MEDICATION LIST N/A    IMAGING  (FOR ALL VISITS)     EMG N/A    EEG N/A    LUMBAR PUNCTURE N/A    HEATHER SCAN N/A    ULTRASOUND (CAROTID BILAT) *VASCULAR* N/A    MRI (HEAD, NECK, SPINE) N/A    CT (HEAD, NECK, SPINE) N/A

## 2022-10-09 ENCOUNTER — HEALTH MAINTENANCE LETTER (OUTPATIENT)
Age: 62
End: 2022-10-09

## 2022-11-29 DIAGNOSIS — E03.9 HYPOTHYROIDISM, UNSPECIFIED TYPE: ICD-10-CM

## 2022-11-30 RX ORDER — LEVOTHYROXINE SODIUM 112 UG/1
TABLET ORAL
Qty: 90 TABLET | Refills: 1 | Status: SHIPPED | OUTPATIENT
Start: 2022-11-30 | End: 2023-08-30

## 2022-12-18 ENCOUNTER — TRANSFERRED RECORDS (OUTPATIENT)
Dept: HEALTH INFORMATION MANAGEMENT | Facility: CLINIC | Age: 62
End: 2022-12-18

## 2023-01-03 ENCOUNTER — OFFICE VISIT (OUTPATIENT)
Dept: NEUROLOGY | Facility: CLINIC | Age: 63
End: 2023-01-03
Attending: FAMILY MEDICINE
Payer: COMMERCIAL

## 2023-01-03 DIAGNOSIS — G62.9 AXONAL POLYNEUROPATHY: ICD-10-CM

## 2023-01-03 DIAGNOSIS — M25.512 ACUTE PAIN OF LEFT SHOULDER: Primary | ICD-10-CM

## 2023-01-03 LAB
ALBUMIN SERPL-MCNC: 4.1 G/DL (ref 3.4–5)
ALP SERPL-CCNC: 78 U/L (ref 40–150)
ALT SERPL W P-5'-P-CCNC: 32 U/L (ref 0–50)
ANION GAP SERPL CALCULATED.3IONS-SCNC: 3 MMOL/L (ref 3–14)
AST SERPL W P-5'-P-CCNC: 15 U/L (ref 0–45)
BILIRUB SERPL-MCNC: 0.5 MG/DL (ref 0.2–1.3)
BUN SERPL-MCNC: 14 MG/DL (ref 7–30)
CALCIUM SERPL-MCNC: 9.5 MG/DL (ref 8.5–10.1)
CHLORIDE BLD-SCNC: 107 MMOL/L (ref 94–109)
CO2 SERPL-SCNC: 30 MMOL/L (ref 20–32)
CREAT SERPL-MCNC: 0.85 MG/DL (ref 0.52–1.04)
GFR SERPL CREATININE-BSD FRML MDRD: 77 ML/MIN/1.73M2
GLUCOSE BLD-MCNC: 105 MG/DL (ref 70–99)
HBA1C MFR BLD: 5.7 % (ref 0–5.6)
POTASSIUM BLD-SCNC: 4.3 MMOL/L (ref 3.4–5.3)
PROT SERPL-MCNC: 7.7 G/DL (ref 6.8–8.8)
SODIUM SERPL-SCNC: 140 MMOL/L (ref 133–144)
TOTAL PROTEIN SERUM FOR ELP: 7.1 G/DL (ref 6.4–8.3)

## 2023-01-03 PROCEDURE — 86334 IMMUNOFIX E-PHORESIS SERUM: CPT

## 2023-01-03 PROCEDURE — 36415 COLL VENOUS BLD VENIPUNCTURE: CPT | Performed by: INTERNAL MEDICINE

## 2023-01-03 PROCEDURE — 99205 OFFICE O/P NEW HI 60 MIN: CPT | Performed by: INTERNAL MEDICINE

## 2023-01-03 PROCEDURE — 84165 PROTEIN E-PHORESIS SERUM: CPT

## 2023-01-03 PROCEDURE — 80053 COMPREHEN METABOLIC PANEL: CPT | Performed by: INTERNAL MEDICINE

## 2023-01-03 PROCEDURE — 99000 SPECIMEN HANDLING OFFICE-LAB: CPT | Performed by: INTERNAL MEDICINE

## 2023-01-03 PROCEDURE — 83036 HEMOGLOBIN GLYCOSYLATED A1C: CPT | Performed by: INTERNAL MEDICINE

## 2023-01-03 PROCEDURE — 84207 ASSAY OF VITAMIN B-6: CPT | Mod: 90 | Performed by: INTERNAL MEDICINE

## 2023-01-03 PROCEDURE — 99207 TOTAL PROTEIN, SERUM FOR ELP: CPT | Mod: 59 | Performed by: INTERNAL MEDICINE

## 2023-01-03 RX ORDER — DULOXETIN HYDROCHLORIDE 30 MG/1
30 CAPSULE, DELAYED RELEASE ORAL DAILY
Qty: 30 CAPSULE | Refills: 5 | Status: SHIPPED | OUTPATIENT
Start: 2023-01-03 | End: 2023-06-29

## 2023-01-03 NOTE — LETTER
"    1/3/2023         RE: Bel Cruz  [address] [facility] [address]        Dear Colleague,    Thank you for referring your patient, Bel Cruz, to the [facility] NEUROLOGY CLINIC [place]. Please see a copy of my visit note below.    [facility] Neurology Consultation    Bel Cruz MRN# [id number]   Age: 62 year old YOB: 1960     Requesting physician: Lala Saeed     Reason for Consultation: neuropathy       History of Presenting Symptoms:   Bel Cruz is a 62 year old female who presents today for evaluation of neuropathy.     Patient believes that she started getting numb sensations in the feet around 2020. Around March/April 2022 she began having \"hot\" \"burning\" sensations in the feet. She would go to bed and have to have a fan on the feet. Around May 2022 she started getting prickly sensations throughout the body that would come and go. She began to get more numb sensations in the feet as well. She notices slight jerks in the extremities, which come and go.     She had EMG in July 2022, which showed neuropathy.     She started getting left shoulder pain several weeks ago and went to [facility] and had imaging done. When she bends her left wrist/arm she has arm pain.       Past Medical History:     Patient Active Problem List   Diagnosis     Hypothyroidism     Generalized anxiety disorder     Atrophic vaginitis     Gastroesophageal reflux disease without esophagitis     Past Medical History:   Diagnosis Date     Allergic rhinitis due to other allergen      Benign essential hypertension 6/10/2020     Esophageal reflux      GENERALIZED ANXIETY DIS 6/27/2007     Panic disorder without agoraphobia 1990     Trochanteric bursitis of right hip 8/2/2019     Unspecified hypothyroidism     Graves - s/p ablation        Past Surgical History:     Past Surgical History:   Procedure Laterality Date     BIOPSY  1999    Breast biopsy     COLONOSCOPY  7/26/2011 "    Procedure:COMBINED COLONOSCOPY, REMOVE TUMOR/POLYP/LESION BY SNARE; single polyp removal; Surgeon:YUE LIMON; Location:[facility]     COLONOSCOPY Left 6/24/2016    Procedure: COMBINED COLONOSCOPY, SINGLE OR MULTIPLE BIOPSY/POLYPECTOMY BY BIOPSY;  Surgeon: Joseph Keyes MD;  Location: [facility] OR     COLONOSCOPY WITH CO2 INSUFFLATION N/A 6/24/2016    Procedure: COLONOSCOPY WITH CO2 INSUFFLATION;  Surgeon: Joseph Keyes MD;  Location: [facility] OR     ESOPHAGOSCOPY, GASTROSCOPY, DUODENOSCOPY (EGD), COMBINED N/A 5/8/2017    Procedure: COMBINED ESOPHAGOSCOPY, GASTROSCOPY, DUODENOSCOPY (EGD), BIOPSY SINGLE OR MULTIPLE;  ESOPHAGOSCOPY, GASTROSCOPY, DUODENOSCOPY (EGD) wiith multiple biopsies;  Surgeon: Chavez Land MD;  Location:  GI     EYE SURGERY  6/2/20    Cataract Surgery both eyes     GYN SURGERY  2005    Ablation of uterus.     [facility] HYSTEROSCOPY DIAGOSTIC (SEPARATE PROC)  8/10/2004    Hysteroscopy, D&C, Endometrial ablation     PELVIS LAPAROSCOPY,DX  1996    Pelvic pain - adhesions and mild endometriosis     US BREAST CLIP PLACEMENT W BIOPSY LEFT          Social History:     Social History     Tobacco Use     Smoking status: Never     Smokeless tobacco: Never     Tobacco comments:     no smokers in household   Vaping Use     Vaping Use: Never used   Substance Use Topics     Alcohol use: No     Drug use: No        Family History:     Family History   Problem Relation Age of Onset     Thyroid Disease Father         Hashimotos     Hypertension Father      Hyperlipidemia Mother      Thyroid Disease Son      Sjogren's Paternal Grandmother      Sjogren's Paternal Aunt         Medications:     Current Outpatient Medications   Medication Sig     levothyroxine (SYNTHROID/LEVOTHROID) 112 MCG tablet TAKE 1 TABLET(112 MCG) BY MOUTH DAILY     Multiple Vitamins-Minerals (WOMENS MULTIVITAMIN PLUS PO) Take 1 tablet by mouth daily. Over 50      PARoxetine (PAXIL) 10 MG tablet TAKE 1/2 TABLET(5 MG) BY MOUTH  EVERY MORNING     No current facility-administered medications for this visit.        Allergies:     Allergies   Allergen Reactions     Amoxicillin      tightness in throat     Cephalosporins      ceftin     Clindamycin Base      Nausea, lightheadeness     Erythromycin      Welts     Macrolides      Sulfa Drugs Swelling     bactrim        Review of Systems:   As above     Physical Exam:   Vitals: [facility] 07/18/2005    General: Seated comfortably in no acute distress.  Lungs: breathing comfortably  Extremities: no edema  Skin: No rashes on exposed skin  Neurologic:     Mental Status: Fully alert, attentive. Normal memory and fund of knowledge. Language normal, speech clear and fluent, no paraphasic errors.     Cranial Nerves: Visual fields intact. PERRL. EOMI with normal smooth pursuit. Facial sensation intact/symmetric. Facial movements symmetric. Hearing not formally tested but intact to conversation. Palate elevation symmetric, uvula midline. No dysarthria. Shoulder shrug strong bilaterally. Tongue protrusion midline.     Motor: No tremors or other abnormal movements observed. Muscle tone normal throughout. Normal/symmetric rapid finger tapping. Strength 5/5 throughout upper and lower extremities.      Right Left   Shoulder abduction        5 5   Elbow extension 5 5   Elbow flexion 5 5   Wrist extension         5 5   Finger extension 5 5   ADM 5 5   FDI 5 5   APB 5 5   Hip flexion 5 5   Knee flexion 5 5   Knee extension 5 5   Dorsiflexion 5 5   Plantar flexion 5 5        Deep Tendon Reflexes: 2+/symmetric throughout upper and lower extremities with exception of 1+ ankle jerks. No clonus. Toes downgoing bilaterally.     Sensory: Intact/symmetric to proprioception throughout upper and lower extremities. Decreased sensation to light touch in the bilateral toes with exception of big toes. Decreased sensation to temperature in the feet compared to hands. Vibration is 1-2 seconds in the bilateral great toes, ~4-5 seconds in  the bilateral ankles. Negative Romberg.      Coordination: Finger-nose-finger and heel-shin intact without dysmetria. Rapid alternating movements intact/symmetric with normal speed and rhythm.     Gait: Normal, steady casual gait. Able to walk on toes, heels and tandem without difficulty.         Data: Pertinent prior to visit   Imaging:  MRI lumbar 2019  Impression: Stable findings since 12/27/2016.  1. Mild lumbar spondylosis without significant spinal canal stenosis.  Mild right neural foraminal narrowing at L4-5.  2. Mild anterolisthesis of L4 and L5.    Procedures:  EMG 7/2022  Abnormal study.   --There is electrodiagnostic evidence suggestive of a mild sensorimotor, length dependent, predominantly axonal polyneuropathy.  --There is no electrodiagnostic evidence of median or ulnar mononeuropathy in either upper extremity.  --There is no electrodiagnostic evidence of right upper or right lower extremity radiculopathy.    Laboratory:  B12 385, TSH/CBC normal (7/2022)  CRP normal (2/2022)  2021 outside labs - reportedly positive NIKOS, but negative sjogrens, dsDNA         Assessment and Plan:   Assessment:  Bel Cruz is a 62 year old female who presents today for evaluation of neuropathy. Symptoms of numbness in the feet started around 2020. In the last year she has developed more bothersome burning and prickly sensations. EMG from 7/2022 showed a mild sensorimotor length dependent axonal polyneuropathy. She has had neuropathy work-up as above and additional lab tests were ordered as below. She is intermittent in trial of Cymbalta for nerve pain, which may also help co-existing anxiety.    Patient has had left shoulder pain for several weeks after moving some boxes. Description of symptoms is most suggestive of a musculoskeletal cause. She has already been seen at [facility]. PT referral was placed today.      Plan:  - Serum A1c, immunofixation, ELP, B6, CMP  - Cymbalta 30 mg daily   - PT referral for left shoulder  pain    Follow up in Neurology clinic in 4-5 months or earlier as needed should new concerns arise.    Stanley Cosme MD   of Neurology  [facility]      The total time of this encounter today amounted to 70 minutes. This time included time spent with the patient, prep work, ordering tests, and performing post visit documentation.        Again, thank you for allowing me to participate in the care of your patient.        Sincerely,        Stanley Cosme MD

## 2023-01-03 NOTE — PROGRESS NOTES
"N Neurology Consultation    Bel Cruz MRN# [id number]   Age: 62 year old YOB: 1960     Requesting physician: Lala Saeed     Reason for Consultation: neuropathy       History of Presenting Symptoms:   Bel Cruz is a 62 year old female who presents today for evaluation of neuropathy.     Patient believes that she started getting numb sensations in the feet around 2020. Around March/April 2022 she began having \"hot\" \"burning\" sensations in the feet. She would go to bed and have to have a fan on the feet. Around May 2022 she started getting prickly sensations throughout the body that would come and go. She began to get more numb sensations in the feet as well. She notices slight jerks in the extremities, which come and go.     She had EMG in July 2022, which showed neuropathy.     She started getting left shoulder pain several weeks ago and went to [facility] and had imaging done. When she bends her left wrist/arm she has arm pain.       Past Medical History:     Patient Active Problem List   Diagnosis     Hypothyroidism     Generalized anxiety disorder     Atrophic vaginitis     Gastroesophageal reflux disease without esophagitis     Past Medical History:   Diagnosis Date     Allergic rhinitis due to other allergen      Benign essential hypertension 6/10/2020     Esophageal reflux      GENERALIZED ANXIETY DIS 6/27/2007     Panic disorder without agoraphobia 1990     Trochanteric bursitis of right hip 8/2/2019     Unspecified hypothyroidism     [facility] - s/p ablation        Past Surgical History:     Past Surgical History:   Procedure Laterality Date     BIOPSY  1999    Breast biopsy     COLONOSCOPY  7/26/2011    Procedure:COMBINED COLONOSCOPY, REMOVE TUMOR/POLYP/LESION BY SNARE; single polyp removal; Surgeon:YUE LIMON; Location:[facility]     COLONOSCOPY Left 6/24/2016    Procedure: COMBINED COLONOSCOPY, SINGLE OR MULTIPLE BIOPSY/POLYPECTOMY BY BIOPSY;  Surgeon: " Joseph Keyes MD;  Location: [facility]     COLONOSCOPY WITH CO2 INSUFFLATION N/A 6/24/2016    Procedure: COLONOSCOPY WITH CO2 INSUFFLATION;  Surgeon: Joseph Keyes MD;  Location: [facility]     ESOPHAGOSCOPY, GASTROSCOPY, DUODENOSCOPY (EGD), COMBINED N/A 5/8/2017    Procedure: COMBINED ESOPHAGOSCOPY, GASTROSCOPY, DUODENOSCOPY (EGD), BIOPSY SINGLE OR MULTIPLE;  ESOPHAGOSCOPY, GASTROSCOPY, DUODENOSCOPY (EGD) wiith multiple biopsies;  Surgeon: Chavez Land MD;  Location:  GI     EYE SURGERY  6/2/20    Cataract Surgery both eyes     GYN SURGERY  2005    Ablation of uterus.     HC HYSTEROSCOPY DIAGOSTIC (SEPARATE PROC)  8/10/2004    Hysteroscopy, D&C, Endometrial ablation     PELVIS LAPAROSCOPY,DX  1996    Pelvic pain - adhesions and mild endometriosis     US BREAST CLIP PLACEMENT W BIOPSY LEFT          Social History:     Social History     Tobacco Use     Smoking status: Never     Smokeless tobacco: Never     Tobacco comments:     no smokers in household   Vaping Use     Vaping Use: Never used   Substance Use Topics     Alcohol use: No     Drug use: No        Family History:     Family History   Problem Relation Age of Onset     Thyroid Disease Father         Hashimotos     Hypertension Father      Hyperlipidemia Mother      Thyroid Disease Son      Sjogren's Paternal Grandmother      Sjogren's Paternal Aunt         Medications:     Current Outpatient Medications   Medication Sig     levothyroxine (SYNTHROID/LEVOTHROID) 112 MCG tablet TAKE 1 TABLET(112 MCG) BY MOUTH DAILY     Multiple Vitamins-Minerals (WOMENS MULTIVITAMIN PLUS PO) Take 1 tablet by mouth daily. Over 50      PARoxetine (PAXIL) 10 MG tablet TAKE 1/2 TABLET(5 MG) BY MOUTH EVERY MORNING     No current facility-administered medications for this visit.        Allergies:     Allergies   Allergen Reactions     Amoxicillin      tightness in throat     Cephalosporins      ceftin     Clindamycin Base      Nausea, lightheadeness      Erythromycin      Welts     Macrolides      Sulfa Drugs Swelling     bactrim        Review of Systems:   As above     Physical Exam:   Vitals: [facility] 07/18/2005    General: Seated comfortably in no acute distress.  Lungs: breathing comfortably  Extremities: no edema  Skin: No rashes on exposed skin  Neurologic:     Mental Status: Fully alert, attentive. Normal memory and fund of knowledge. Language normal, speech clear and fluent, no paraphasic errors.     Cranial Nerves: Visual fields intact. PERRL. EOMI with normal smooth pursuit. Facial sensation intact/symmetric. Facial movements symmetric. Hearing not formally tested but intact to conversation. Palate elevation symmetric, uvula midline. No dysarthria. Shoulder shrug strong bilaterally. Tongue protrusion midline.     Motor: No tremors or other abnormal movements observed. Muscle tone normal throughout. Normal/symmetric rapid finger tapping. Strength 5/5 throughout upper and lower extremities.      Right Left   Shoulder abduction        5 5   Elbow extension 5 5   Elbow flexion 5 5   Wrist extension         5 5   Finger extension 5 5   ADM 5 5   FDI 5 5   APB 5 5   Hip flexion 5 5   Knee flexion 5 5   Knee extension 5 5   Dorsiflexion 5 5   Plantar flexion 5 5        Deep Tendon Reflexes: 2+/symmetric throughout upper and lower extremities with exception of 1+ ankle jerks. No clonus. Toes downgoing bilaterally.     Sensory: Intact/symmetric to proprioception throughout upper and lower extremities. Decreased sensation to light touch in the bilateral toes with exception of big toes. Decreased sensation to temperature in the feet compared to hands. Vibration is 1-2 seconds in the bilateral great toes, ~4-5 seconds in the bilateral ankles. Negative Romberg.      Coordination: Finger-nose-finger and heel-shin intact without dysmetria. Rapid alternating movements intact/symmetric with normal speed and rhythm.     Gait: Normal, steady casual gait. Able to walk on toes,  heels and tandem without difficulty.         Data: Pertinent prior to visit   Imaging:  MRI lumbar 2019  Impression: Stable findings since 12/27/2016.  1. Mild lumbar spondylosis without significant spinal canal stenosis.  Mild right neural foraminal narrowing at L4-5.  2. Mild anterolisthesis of L4 and L5.    Procedures:  EMG 7/2022  Abnormal study.   --There is electrodiagnostic evidence suggestive of a mild sensorimotor, length dependent, predominantly axonal polyneuropathy.  --There is no electrodiagnostic evidence of median or ulnar mononeuropathy in either upper extremity.  --There is no electrodiagnostic evidence of right upper or right lower extremity radiculopathy.    Laboratory:  B12 385, TSH/CBC normal (7/2022)  CRP normal (2/2022)  2021 outside labs - reportedly positive NIKOS, but negative sjogrens, dsDNA         Assessment and Plan:   Assessment:  Bel Cruz is a 62 year old female who presents today for evaluation of neuropathy. Symptoms of numbness in the feet started around 2020. In the last year she has developed more bothersome burning and prickly sensations. EMG from 7/2022 showed a mild sensorimotor length dependent axonal polyneuropathy. She has had neuropathy work-up as above and additional lab tests were ordered as below. She is intermittent in trial of Cymbalta for nerve pain, which may also help co-existing anxiety.    Patient has had left shoulder pain for several weeks after moving some boxes. Description of symptoms is most suggestive of a musculoskeletal cause. She has already been seen at [facility]. PT referral was placed today.      Plan:  - Serum A1c, immunofixation, ELP, B6, CMP  - Cymbalta 30 mg daily   - PT referral for left shoulder pain    Follow up in Neurology clinic in 4-5 months or earlier as needed should new concerns arise.    Stanley Cosme MD   of Neurology  [facility]      The total time of this encounter today amounted to 70 minutes. This  time included time spent with the patient, prep work, ordering tests, and performing post visit documentation.

## 2023-01-03 NOTE — NURSING NOTE
Bel Cruz's goals for this visit include:   Chief Complaint   Patient presents with     New Patient     Axonal polyneuropathy         She requests these members of her care team be copied on today's visit information: yes    PCP: Lala Falcon    Referring Provider:  Lala Falcon MD  [address] [place] [id number]    [facility] 07/18/2005     Do you need any medication refills at today's visit? No  CORAZON Anguiano, SONIA ([facility])

## 2023-01-04 LAB
ALBUMIN SERPL ELPH-MCNC: 4.5 G/DL (ref 3.7–5.1)
ALPHA1 GLOB SERPL ELPH-MCNC: 0.2 G/DL (ref 0.2–0.4)
ALPHA2 GLOB SERPL ELPH-MCNC: 0.6 G/DL (ref 0.5–0.9)
B-GLOBULIN SERPL ELPH-MCNC: 0.7 G/DL (ref 0.6–1)
GAMMA GLOB SERPL ELPH-MCNC: 1.2 G/DL (ref 0.7–1.6)
M PROTEIN SERPL ELPH-MCNC: 0 G/DL
PROT PATTERN SERPL ELPH-IMP: NORMAL
PROT PATTERN SERPL IFE-IMP: NORMAL

## 2023-01-06 LAB — PYRIDOXAL PHOS SERPL-SCNC: 73.4 NMOL/L

## 2023-01-09 ENCOUNTER — THERAPY VISIT (OUTPATIENT)
Dept: PHYSICAL THERAPY | Facility: CLINIC | Age: 63
End: 2023-01-09
Attending: INTERNAL MEDICINE
Payer: COMMERCIAL

## 2023-01-09 DIAGNOSIS — M25.512 ACUTE PAIN OF LEFT SHOULDER: ICD-10-CM

## 2023-01-09 DIAGNOSIS — M54.12 CERVICAL RADICULOPATHY: Primary | ICD-10-CM

## 2023-01-09 PROCEDURE — 97161 PT EVAL LOW COMPLEX 20 MIN: CPT | Mod: GP | Performed by: PHYSICAL THERAPIST

## 2023-01-09 PROCEDURE — 97110 THERAPEUTIC EXERCISES: CPT | Mod: GP | Performed by: PHYSICAL THERAPIST

## 2023-01-09 NOTE — PROGRESS NOTES
Physical Therapy Initial Evaluation  Subjective:  The history is provided by the patient. No  was used.   Patient Health History  Bel Cruz being seen for Acute pain of the left shoulder that showed up after moving boxes. Pain initially built over a week period becoming a throbbing constant pain. Various pain meds helped to some degree. .     Problem began: 12/11/2022.   Problem occurred: After moving boxes, no traumatic incident   Pain is reported as 1/10 on pain scale.  General health as reported by patient is good.  Pertinent medical history includes: numbness/tingling and thyroid problems.   Red flags:  None as reported by patient.  Medical allergies: none.   Surgeries include:  None.    Current medications:  Anti-depressants, anti-inflammatory, sleep medication and thyroid medication.    Current occupation is Education/.   Primary job tasks include:  Computer work and lifting/carrying.                  Therapist Generated HPI Evaluation  Problem details: Patient recently started taking an anti-depressant for peripheral neuropathy which has also greatly helped L shoulder pain.      No significant history of neck pain but increased tension in the area recently.         Type of problem:  Left shoulder.    This is a new condition.  Condition occurred with:  While carrying an object.  Where condition occurred: at home.  Patient reports pain:  Anterior and in the joint.  Pain is described as aching, burning and other (throbbing) and is intermittent.  Radiates to: sometimes tingling/numbness down whole arm; bicep area generally sore. Pain is worse in the P.M. (but better at night).  Since onset symptoms are gradually worsening.  Associated symptoms:  Numbness and tingling (at times). Symptoms are exacerbated by certain positions, using arm at shoulder level, using arm behind back, using arm overhead, lifting and carrying (has been avoiding lying on that side)  and relieved by heat  "and ice (recently started anti-depressant that correlated with improved pain').  Special tests included:  X-ray (normal per [facility] (not viewable by PT)).  Previous treatment includes chiropractic (mostly massage). There was mild improvement following previous treatment.  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  Barriers include:  None as reported by patient.                        Objective:  System              Cervical/Thoracic Evaluation    AROM:  AROM Cervical:    Flexion:            Chin to chest  Extension:       Min loss, + pain base of neck  Rotation:         Left: min loss, + L sided pain     Right: min loss, + L sided pain  Side Bend:      Left: min loss, + L sided pain     Right:  Min loss, + L sided pain      Headaches: none  Cervical Myotomes:  normal                    Neural Tension:    Left side:  Median positive.    Right side:  Median  negative.          Spinal Segmental Conclusions:    Level:  Hypo at C5, C6 and C4             Shoulder Evaluation:  ROM:  AROM:    Flexion:  Left:  180, \"tightness\" last 25% of motion    Right:  180  Extension: Left: normal, \"tightness\" last 25% of motionRight: normal, \"tightness\" last 25% of motion  Abduction:  Left: 180   Right:  180    Internal Rotation:  Left:  Base of scapula    Right:  Mid scapula  External Rotation:  Left:  60    Right:  75                      Strength:    Flexion: Left:5-/5   Pain:    Right: 5/5     Pain:     Abduction:  Left: 3-/5  Pain:    Right: 5/5     Pain:    Internal Rotation:  Left:5/5     Pain:    Right: 5/5     Pain:  External Rotation:   Left:5/5     Pain:   Right:5/5     Pain:        Elbow Flexion:  Left:5/5     Pain:    Right:5/5     Pain:  Elbow Extension:  Left:5/5     Pain:    Right:5/5     Pain:  Stability Testing:  normal      Special Tests:  Special tests assessed shoulder: - HK, - Crossover.      Palpation:    Left shoulder tenderness present at:  " Infraspinatus; Teres Minor; Levator; Rhomboids and Upper Trap  Right shoulder tenderness present at: Upper Trap  Mobility Tests:    Glenohumeral anterior left:  Normal  Glenohumeral anterior right:  Normal  Glenohumeral posterior left:  Normal  Glenohumeral posterior right:  Normal  Glenohumeral inferior left:  Normal  Glenohumeral inferior right:  Normal      Scapulothoracic left:  Normal  Scapulothoracic right:  Normal                                       General     ROS    Assessment/Plan:    Patient is a 62 year old female with cervical and left side shoulder complaints.    Patient has the following significant findings with corresponding treatment plan.                Diagnosis 1:  L shoulder pain, mechanical vs. Cervical radiculopathy with L arm pain  Pain -  hot/cold therapy, manual therapy, self management, education, directional preference exercise and home program  Decreased ROM/flexibility - manual therapy, therapeutic exercise and home program  Decreased joint mobility - manual therapy, therapeutic exercise and home program  Decreased strength - therapeutic exercise, therapeutic activities and home program  Impaired muscle performance - neuro re-education and home program  Decreased function - therapeutic activities and home program  Impaired posture - neuro re-education and home program    Therapy Evaluation Codes:   Cumulative Therapy Evaluation is: Low complexity.    Previous and current functional limitations:  (See Goal Flow Sheet for this information)    Short term and Long term goals: (See Goal Flow Sheet for this information)     Communication ability:  Patient appears to be able to clearly communicate and understand verbal and written communication and follow directions correctly.  Treatment Explanation - The following has been discussed with the patient:   RX ordered/plan of care  Anticipated outcomes  Possible risks and side effects  This patient would benefit from PT intervention to resume  normal activities.   Rehab potential is good.    Frequency:  1 X week, once daily  Duration:  for 8 weeks  Discharge Plan:  Achieve all LTG.  Independent in home treatment program.  Reach maximal therapeutic benefit.    Please refer to the daily flowsheet for treatment today, total treatment time and time spent performing 1:1 timed codes.

## 2023-01-16 ENCOUNTER — TRANSFERRED RECORDS (OUTPATIENT)
Dept: HEALTH INFORMATION MANAGEMENT | Facility: CLINIC | Age: 63
End: 2023-01-16
Payer: COMMERCIAL

## 2023-01-20 ENCOUNTER — THERAPY VISIT (OUTPATIENT)
Dept: PHYSICAL THERAPY | Facility: CLINIC | Age: 63
End: 2023-01-20
Attending: INTERNAL MEDICINE
Payer: COMMERCIAL

## 2023-01-20 DIAGNOSIS — M54.12 CERVICAL RADICULOPATHY: Primary | ICD-10-CM

## 2023-01-20 DIAGNOSIS — M25.512 ACUTE PAIN OF LEFT SHOULDER: ICD-10-CM

## 2023-01-20 PROCEDURE — 97112 NEUROMUSCULAR REEDUCATION: CPT | Mod: GP | Performed by: PHYSICAL THERAPIST

## 2023-01-20 PROCEDURE — 97110 THERAPEUTIC EXERCISES: CPT | Mod: GP | Performed by: PHYSICAL THERAPIST

## 2023-01-26 ASSESSMENT — ENCOUNTER SYMPTOMS
HEMATURIA: 0
NAUSEA: 0
NERVOUS/ANXIOUS: 0
JOINT SWELLING: 0
HEARTBURN: 0
ABDOMINAL PAIN: 0
PARESTHESIAS: 1
EYE PAIN: 0
FREQUENCY: 0
ARTHRALGIAS: 1
COUGH: 0
PALPITATIONS: 0
HEADACHES: 0
MYALGIAS: 1
DIARRHEA: 0
SORE THROAT: 0
CONSTIPATION: 0
DIZZINESS: 0
BREAST MASS: 0
HEMATOCHEZIA: 0
DYSURIA: 0
FEVER: 0
CHILLS: 0
SHORTNESS OF BREATH: 0
WEAKNESS: 0

## 2023-01-27 ENCOUNTER — OFFICE VISIT (OUTPATIENT)
Dept: FAMILY MEDICINE | Facility: OTHER | Age: 63
End: 2023-01-27
Payer: COMMERCIAL

## 2023-01-27 VITALS
RESPIRATION RATE: 16 BRPM | OXYGEN SATURATION: 96 % | HEART RATE: 76 BPM | WEIGHT: 165 LBS | HEIGHT: 69 IN | SYSTOLIC BLOOD PRESSURE: 122 MMHG | DIASTOLIC BLOOD PRESSURE: 82 MMHG | TEMPERATURE: 97.3 F | BODY MASS INDEX: 24.44 KG/M2

## 2023-01-27 DIAGNOSIS — E03.9 HYPOTHYROIDISM, UNSPECIFIED TYPE: ICD-10-CM

## 2023-01-27 DIAGNOSIS — Z00.00 ROUTINE GENERAL MEDICAL EXAMINATION AT A HEALTH CARE FACILITY: Primary | ICD-10-CM

## 2023-01-27 DIAGNOSIS — D48.5 NEOPLASM OF UNCERTAIN BEHAVIOR OF SKIN: ICD-10-CM

## 2023-01-27 DIAGNOSIS — R20.0 NUMBNESS AND TINGLING OF BOTH LOWER EXTREMITIES: ICD-10-CM

## 2023-01-27 DIAGNOSIS — R20.2 NUMBNESS AND TINGLING IN BOTH HANDS: ICD-10-CM

## 2023-01-27 DIAGNOSIS — R20.2 NUMBNESS AND TINGLING OF BOTH LOWER EXTREMITIES: ICD-10-CM

## 2023-01-27 DIAGNOSIS — R20.0 NUMBNESS AND TINGLING IN BOTH HANDS: ICD-10-CM

## 2023-01-27 PROBLEM — K21.9 GASTROESOPHAGEAL REFLUX DISEASE WITHOUT ESOPHAGITIS: Status: RESOLVED | Noted: 2020-12-18 | Resolved: 2023-01-27

## 2023-01-27 PROBLEM — Z86.0100 HISTORY OF COLONIC POLYPS: Status: ACTIVE | Noted: 2022-08-19

## 2023-01-27 LAB — TSH SERPL DL<=0.005 MIU/L-ACNC: 0.72 UIU/ML (ref 0.3–4.2)

## 2023-01-27 PROCEDURE — 36415 COLL VENOUS BLD VENIPUNCTURE: CPT | Performed by: FAMILY MEDICINE

## 2023-01-27 PROCEDURE — 84443 ASSAY THYROID STIM HORMONE: CPT | Performed by: FAMILY MEDICINE

## 2023-01-27 PROCEDURE — 86618 LYME DISEASE ANTIBODY: CPT | Performed by: FAMILY MEDICINE

## 2023-01-27 PROCEDURE — 99396 PREV VISIT EST AGE 40-64: CPT | Performed by: FAMILY MEDICINE

## 2023-01-27 ASSESSMENT — ENCOUNTER SYMPTOMS
PARESTHESIAS: 1
EYE PAIN: 0
NERVOUS/ANXIOUS: 0
NAUSEA: 0
BREAST MASS: 0
HEADACHES: 0
FEVER: 0
SHORTNESS OF BREATH: 0
CHILLS: 0
JOINT SWELLING: 0
WEAKNESS: 0
MYALGIAS: 1
ARTHRALGIAS: 1
CONSTIPATION: 0
DIZZINESS: 0
HEMATOCHEZIA: 0
HEARTBURN: 0
COUGH: 0
SORE THROAT: 0
PALPITATIONS: 0
HEMATURIA: 0
DYSURIA: 0
ABDOMINAL PAIN: 0
DIARRHEA: 0
FREQUENCY: 0

## 2023-01-27 ASSESSMENT — PAIN SCALES - GENERAL: PAINLEVEL: NO PAIN (0)

## 2023-01-27 NOTE — PROGRESS NOTES
SUBJECTIVE:   CC: Laura is an 62 year old who presents for preventive health visit.   Patient has been advised of split billing requirements and indicates understanding: Yes  Healthy Habits:     Getting at least 3 servings of Calcium per day:  Yes    Bi-annual eye exam:  Yes    Dental care twice a year:  Yes    Sleep apnea or symptoms of sleep apnea:  None    Diet:  Regular (no restrictions)    Frequency of exercise:  2-3 days/week    Duration of exercise:  30-45 minutes    Taking medications regularly:  Yes    Medication side effects:  None    PHQ-2 Total Score: 0    Additional concerns today:  Yes      Today's PHQ-2 Score:   PHQ-2 ( 1999 [employer]) 1/26/2023   Q1: Little interest or pleasure in doing things 0   Q2: Feeling down, depressed or hopeless 0   PHQ-2 Score 0   PHQ-2 Total Score (12-17 Years)- Positive if 3 or more points; Administer PHQ-A if positive -   Q1: Little interest or pleasure in doing things Not at all   Q2: Feeling down, depressed or hopeless Not at all   PHQ-2 Score 0           Social History     Tobacco Use     Smoking status: Never     Smokeless tobacco: Never     Tobacco comments:     no smokers in household   Substance Use Topics     Alcohol use: No         Alcohol Use 1/26/2023   Prescreen: >3 drinks/day or >7 drinks/week? Not Applicable   Prescreen: >3 drinks/day or >7 drinks/week? -       Reviewed orders with patient.  Reviewed health maintenance and updated orders accordingly - Yes      Breast Cancer Screening:    Breast CA Risk Assessment (FHS-7) 2/18/2022   Do you have a family history of breast, colon, or ovarian cancer? No / Unknown       Mammogram Screening: Recommended mammography every 1-2 years with patient discussion and risk factor consideration  Pertinent mammograms are reviewed under the imaging tab.    History of abnormal Pap smear: NO - age 30-65 PAP every 5 years with negative HPV co-testing recommended  PAP / HPV Latest Ref Rng & Units 5/3/2019 10/17/2014 8/31/2011   PAP  "(Historical) - NIL NIL NIL   HPV16 NEG:Negative Negative - -   HPV18 NEG:Negative Negative - -   HRHPV NEG:Negative Negative - -     Reviewed and updated as needed this visit by clinical staff   Tobacco  Allergies  Meds  Problems  Med Hx  Surg Hx  Fam Hx          Reviewed and updated as needed this visit by Provider   Tobacco  Allergies  Meds  Problems  Med Hx  Surg Hx  Fam Hx           Review of Systems   Constitutional: Negative for chills and fever.   HENT: Negative for congestion, ear pain, hearing loss and sore throat.    Eyes: Negative for pain and visual disturbance.   Respiratory: Negative for cough and shortness of breath.    Cardiovascular: Negative for chest pain, palpitations and peripheral edema.   Gastrointestinal: Negative for abdominal pain, constipation, diarrhea, heartburn, hematochezia and nausea.   Breasts:  Negative for tenderness, breast mass and discharge.   Genitourinary: Positive for genital sores. Negative for dysuria, frequency, hematuria, pelvic pain, urgency, vaginal bleeding and vaginal discharge.   Musculoskeletal: Positive for arthralgias and myalgias. Negative for joint swelling.   Skin: Negative for rash.   Neurological: Positive for paresthesias. Negative for dizziness, weakness and headaches.   Psychiatric/Behavioral: Negative for mood changes. The patient is not nervous/anxious.         OBJECTIVE:   /82   Pulse 76   Temp 97.3  F (36.3  C) (Temporal)   Resp 16   Ht 1.75 m (5' 8.9\")   Wt 74.8 kg (165 lb)   LMP 07/18/2005   SpO2 96%   BMI 24.44 kg/m    Physical Exam  Constitutional:       General: She is not in acute distress.     Appearance: She is well-developed.   HENT:      Right Ear: Tympanic membrane and external ear normal.      Left Ear: Tympanic membrane and external ear normal.      Nose: Nose normal.   Eyes:      General:         Right eye: No discharge.         Left eye: No discharge.      Conjunctiva/sclera: Conjunctivae normal.      Pupils: " Pupils are equal, round, and reactive to light.   Neck:      Thyroid: No thyroid mass.   Cardiovascular:      Rate and Rhythm: Normal rate and regular rhythm.      Heart sounds: Normal heart sounds, S1 normal and S2 normal. No murmur heard.  Pulmonary:      Effort: Pulmonary effort is normal. No respiratory distress.      Breath sounds: Normal breath sounds. No wheezing or rales.   Abdominal:      General: Bowel sounds are normal.      Palpations: Abdomen is soft. There is no mass.      Tenderness: There is no abdominal tenderness.   Musculoskeletal:         General: Normal range of motion.      Cervical back: Neck supple.   Lymphadenopathy:      Cervical: No cervical adenopathy.   Skin:     General: Skin is warm and dry.      Findings: No rash.      Comments: Left upper chest there is a 2 mm smooth, flesh-colored papule with telangiectasia and she has a similar lesion on her left lower back which is about 3 mm.   Neurological:      Mental Status: She is alert and oriented to person, place, and time.   Psychiatric:         Thought Content: Thought content normal.         Judgment: Judgment normal.         ASSESSMENT/PLAN:   (Z00.00) Routine general medical examination at a health care facility  (primary encounter diagnosis)    (E03.9) Hypothyroidism, unspecified type  Comment: Labs drawn.  Plan: TSH with free T4 reflex          (R20.0,  R20.2) Numbness and tingling in both hands/(R20.0,  R20.2) Numbness and tingling of both lower extremities  Comment: She is following with neurology for neuropathy.  They started her on duloxetine and she feels it has been helpful with the burning in her feet.    (D48.5) Neoplasm of uncertain behavior of skin  Comment: At first glance her skin lesions appeared similar to developing skin tags but with the telangiectasias would like dermatology to review for possible basal cell.      COUNSELING:  Reviewed preventive health counseling, as reflected in patient instructions      BMI:  "  Estimated body mass index is 24.44 kg/m  as calculated from the following:    Height as of this encounter: 1.75 m (5' 8.9\").    Weight as of this encounter: 74.8 kg (165 lb).         She reports that she has never smoked. She has never used smokeless tobacco.      Lala Falcon MD  [facility]  "

## 2023-01-30 LAB — B BURGDOR IGG+IGM SER QL: 0.19

## 2023-01-31 ENCOUNTER — THERAPY VISIT (OUTPATIENT)
Dept: PHYSICAL THERAPY | Facility: CLINIC | Age: 63
End: 2023-01-31
Payer: COMMERCIAL

## 2023-01-31 DIAGNOSIS — M54.12 CERVICAL RADICULOPATHY: Primary | ICD-10-CM

## 2023-01-31 DIAGNOSIS — M25.512 ACUTE PAIN OF LEFT SHOULDER: ICD-10-CM

## 2023-01-31 PROCEDURE — 97112 NEUROMUSCULAR REEDUCATION: CPT | Mod: GP | Performed by: PHYSICAL THERAPY ASSISTANT

## 2023-01-31 PROCEDURE — 97110 THERAPEUTIC EXERCISES: CPT | Mod: GP | Performed by: PHYSICAL THERAPY ASSISTANT

## 2023-02-01 DIAGNOSIS — E03.9 HYPOTHYROIDISM, UNSPECIFIED TYPE: ICD-10-CM

## 2023-02-02 RX ORDER — LEVOTHYROXINE SODIUM 112 UG/1
TABLET ORAL
Qty: 90 TABLET | Refills: 1 | OUTPATIENT
Start: 2023-02-02

## 2023-02-27 ENCOUNTER — E-VISIT (OUTPATIENT)
Dept: FAMILY MEDICINE | Facility: OTHER | Age: 63
End: 2023-02-27
Payer: COMMERCIAL

## 2023-02-27 DIAGNOSIS — R19.7 DIARRHEA, UNSPECIFIED TYPE: Primary | ICD-10-CM

## 2023-02-27 PROCEDURE — 99421 OL DIG E/M SVC 5-10 MIN: CPT | Performed by: FAMILY MEDICINE

## 2023-02-27 NOTE — PATIENT INSTRUCTIONS
Thank you for choosing us for your care. Given your symptoms, I would like to get stool specimens.  I have placed the orders, you will need to  the supplies from the clinic.   I will let you know when the results are back and next steps to take.

## 2023-02-28 PROCEDURE — 87177 OVA AND PARASITES SMEARS: CPT | Performed by: FAMILY MEDICINE

## 2023-02-28 PROCEDURE — 87493 C DIFF AMPLIFIED PROBE: CPT | Mod: 59 | Performed by: FAMILY MEDICINE

## 2023-02-28 PROCEDURE — 87506 IADNA-DNA/RNA PROBE TQ 6-11: CPT | Performed by: FAMILY MEDICINE

## 2023-02-28 PROCEDURE — 87209 SMEAR COMPLEX STAIN: CPT | Performed by: FAMILY MEDICINE

## 2023-03-01 ENCOUNTER — APPOINTMENT (OUTPATIENT)
Dept: LAB | Facility: OTHER | Age: 63
End: 2023-03-01
Payer: COMMERCIAL

## 2023-03-02 LAB
C COLI+JEJUNI+LARI FUSA STL QL NAA+PROBE: NOT DETECTED
C DIFF TOX B STL QL: NEGATIVE
EC STX1 GENE STL QL NAA+PROBE: NOT DETECTED
EC STX2 GENE STL QL NAA+PROBE: NOT DETECTED
NOROV GI+II ORF1-ORF2 JNC STL QL NAA+PR: NOT DETECTED
O+P STL MICRO: NEGATIVE
RVA NSP5 STL QL NAA+PROBE: NOT DETECTED
SALMONELLA SP RPOD STL QL NAA+PROBE: NOT DETECTED
SHIGELLA SP+EIEC IPAH STL QL NAA+PROBE: NOT DETECTED
V CHOL+PARA RFBL+TRKH+TNAA STL QL NAA+PR: NOT DETECTED
Y ENTERO RECN STL QL NAA+PROBE: NOT DETECTED

## 2023-04-11 PROBLEM — M25.512 ACUTE PAIN OF LEFT SHOULDER: Status: RESOLVED | Noted: 2023-01-09 | Resolved: 2023-04-11

## 2023-04-11 PROBLEM — M54.12 CERVICAL RADICULOPATHY: Status: RESOLVED | Noted: 2023-01-09 | Resolved: 2023-04-11

## 2023-04-11 NOTE — PROGRESS NOTES
Discharge Note    Progress reporting period is from initial evaluation date (please see noted date below) to Jan 31, 2023.  Linked Episodes   Type: Episode: Status: Noted: Resolved: Last update: Updated by:   PHYSICAL THERAPY L shoulder 1/9/23 Active 1/9/2023 1/31/2023  2:54 PM Frankie Lion, PT      Comments:       Bel failed to follow up and current status is unknown.  Please see information below for last relevant information on current status.  Patient seen for 3 visits.    SUBJECTIVE  Subjective changes noted by patient:  Pt very pleased with decreased pain level. Current symptoms of clicking/pulling sensation during exercise performance. Sleeping tolerance has improved significantly.  .  Current pain level is 0/10.     Previous pain level was  3/10.   Changes in function:  Yes (See Goal flowsheet attached for changes in current functional level)  Adverse reaction to treatment or activity: None    OBJECTIVE  Changes noted in objective findings: Focused on correct form/posture with stabilization exercises. L shld AROM WNL except ext/IR to T8 (T4 on R), slight loss with reaching across body-improved by end of session.     ASSESSMENT/PLAN  Diagnosis: L shoulder pain, mechanical vs. cervical radiculopathy   Updated problem list and treatment plan:   Decreased ROM/flexibility - HEP  Decreased function - HEP  STG/LTGs have been met or progress has been made towards goals:  Yes, please see goal flowsheet for most current information  Assessment of Progress: current status is unknown.    Last current status: Pt is progressing as expected   Self Management Plans:  HEP  I have re-evaluated this patient and find that the nature, scope, duration and intensity of the therapy is appropriate for the medical condition of the patient.  Bel continues to require the following intervention to meet STG and LTG's:  HEP.    Recommendations:  Discharge with current home program.  Patient to follow up with MD as  needed.    Please refer to the daily flowsheet for treatment today, total treatment time and time spent performing 1:1 timed codes.    Frankie Salvador,PT, DPT, OCS

## 2023-06-05 NOTE — PROGRESS NOTES
[facility] Neurology Follow Up Visit    Bel Cruz MRN# [id number]   Age: 62 year old YOB: 1960     Brief history of symptoms: The patient was initially seen in neurologic consultation on 1/3/2023 for evaluation of neuropathy. Please see the comprehensive neurologic consultation note from that date in the Epic records for details.     Interval history:   Patient started Cymbalta and this was very helpful for both uncomfortable neuropathy symptoms and anxiety. Unfortunately about a month after starting Cymbalta she developed diarrhea. This has fluctuated in intensity. More recently she has started imodium, which has helped diarrhea.      Patient denies any balance problems.       Past Medical History:     Patient Active Problem List   Diagnosis     Hypothyroidism     Generalized anxiety disorder     Atrophic vaginitis     History of colonic polyps     Past Medical History:   Diagnosis Date     Allergic rhinitis due to other allergen      Benign essential hypertension 6/10/2020     Esophageal reflux      GENERALIZED ANXIETY DIS 6/27/2007     Panic disorder without agoraphobia 1990     Trochanteric bursitis of right hip 8/2/2019     Unspecified hypothyroidism     Graves - s/p ablation        Past Surgical History:     Past Surgical History:   Procedure Laterality Date     BIOPSY  1999    Breast biopsy     COLONOSCOPY  7/26/2011    Procedure:COMBINED COLONOSCOPY, REMOVE TUMOR/POLYP/LESION BY SNARE; single polyp removal; Surgeon:YUE LIMON; Location:[facility]     COLONOSCOPY Left 6/24/2016    Procedure: COMBINED COLONOSCOPY, SINGLE OR MULTIPLE BIOPSY/POLYPECTOMY BY BIOPSY;  Surgeon: Joseph Keyes MD;  Location: [facility]     COLONOSCOPY WITH CO2 INSUFFLATION N/A 6/24/2016    Procedure: COLONOSCOPY WITH CO2 INSUFFLATION;  Surgeon: Joseph Keyes MD;  Location: [facility]     ESOPHAGOSCOPY, GASTROSCOPY, DUODENOSCOPY (EGD), COMBINED N/A 5/8/2017    Procedure: COMBINED ESOPHAGOSCOPY, GASTROSCOPY,  DUODENOSCOPY (EGD), BIOPSY SINGLE OR MULTIPLE;  ESOPHAGOSCOPY, GASTROSCOPY, DUODENOSCOPY (EGD) wiith multiple biopsies;  Surgeon: Chavez Land MD;  Location:  GI     EYE SURGERY  6/2/20    Cataract Surgery both eyes     GYN SURGERY  2005    Ablation of uterus.     HC HYSTEROSCOPY DIAGOSTIC (SEPARATE PROC)  8/10/2004    Hysteroscopy, D&C, Endometrial ablation     PELVIS LAPAROSCOPY,DX  1996    Pelvic pain - adhesions and mild endometriosis     US BREAST CLIP PLACEMENT W BIOPSY LEFT          Social History:     Social History     Tobacco Use     Smoking status: Never     Smokeless tobacco: Never     Tobacco comments:     no smokers in household   Vaping Use     Vaping status: Never Used     Passive vaping exposure: Yes   Substance Use Topics     Alcohol use: No     Drug use: No        Family History:     Family History   Problem Relation Age of Onset     Hyperlipidemia Mother      Neuropathy Mother      Thyroid Disease Father         Hashimotos     Hypertension Father      Sjogren's Paternal Grandmother      Thyroid Disease Son      Sjogren's Paternal Aunt         Medications:     Current Outpatient Medications   Medication Sig     DULoxetine (CYMBALTA) 30 MG capsule Take 1 capsule (30 mg) by mouth daily     levothyroxine (SYNTHROID/LEVOTHROID) 112 MCG tablet TAKE 1 TABLET(112 MCG) BY MOUTH DAILY     Multiple Vitamins-Minerals (WOMENS MULTIVITAMIN PLUS PO) Take 1 tablet by mouth daily. Over 50      No current facility-administered medications for this visit.        Allergies:     Allergies   Allergen Reactions     Amoxicillin      tightness in throat     Cephalosporins      ceftin     Clindamycin Base      Nausea, lightheadeness     Erythromycin      Welts     Macrolides And Ketolides      Sulfa Antibiotics Swelling     bactrim     Sulfamethoxazole      Trimethoprim         Review of Systems:   As above     Physical Exam:   Vitals: /81 (BP Location: Right arm, Patient Position: Sitting, Cuff Size:  Adult Regular)   Pulse 69   Wt 75.4 kg (166 lb 4.8 oz)   LMP 07/18/2005   BMI 24.63 kg/m     General: Seated comfortably in no acute distress.  Lungs: breathing comfortably  Neurologic:     Mental Status: Fully alert, attentive. Normal memory and fund of knowledge. Language normal, speech clear and fluent, no paraphasic errors.      Cranial Nerves: Visual fields intact. PERRL. EOMI with normal smooth pursuit. Facial sensation intact/symmetric. Facial movements symmetric. Hearing not formally tested but intact to conversation. Palate elevation symmetric, uvula midline. No dysarthria. Shoulder shrug strong bilaterally. Tongue protrusion midline.     Motor: No tremors or other abnormal movements observed. Muscle tone normal throughout. Strength 5/5 throughout upper and lower extremities.      Right Left   Shoulder abduction        5 5   Elbow extension 5 5   Elbow flexion 5 5   Wrist extension         5 5   Finger extension 5 5   ADM 5 5   FDI 5 5   APB 5 5   Hip flexion 5 5   Knee flexion 5 5   Knee extension 5 5   Dorsiflexion 5 5   Plantar flexion 5 5        Deep Tendon Reflexes: 1+/symmetric throughout upper extremities and lower extremities. No clonus. Toes downgoing bilaterally.     Sensory: Decreased sensation to light touch in toes and anterior bottoms of feet. Temperature sensation is intact throughout. Vibration sense is ~3 seconds in the left great toe and ~2 seconds in the right great toe. Proprioception is intact throughout.      Coordination: Finger-nose-finger and heel-shin intact without dysmetria.      Gait: Steady casual gait.      Data reviewed on previous visits    Imaging:  MRI lumbar 2019  Impression: Stable findings since 12/27/2016.  1. Mild lumbar spondylosis without significant spinal canal stenosis.  Mild right neural foraminal narrowing at L4-5.  2. Mild anterolisthesis of L4 and L5.     Procedures:  EMG 7/2022  Abnormal study.   --There is electrodiagnostic evidence suggestive of a mild  sensorimotor, length dependent, predominantly axonal polyneuropathy.  --There is no electrodiagnostic evidence of median or ulnar mononeuropathy in either upper extremity.  --There is no electrodiagnostic evidence of right upper or right lower extremity radiculopathy.    Laboratory:  B12 385, TSH/CBC normal (7/2022)  CRP normal (2/2022)  2021 outside labs - reportedly positive NIKOS, but negative sjogrens, dsDNA    Pertinent Investigations since last visit:   Labs 1/2023 - A1c 5.7, normal ELP/immunofixation, B6, unremarkable CMP         Assessment and Plan:   Assessment:  Bel Cruz is a 62 year old female who presents today for follow-up of mild sensorimotor length dependent axonal polyneuropathy. She has history of minimal elevation in A1c, otherwise neuropathy work-up was unremarkable. She has had been with Cymbalta, but unfortunately has developed significant diarrhea as a potential side effect. We discussed weaning off of Cymbalta and seeing if diarrhea improves. If it doesn't improved additional work-up may need to be pursued for etiology of diarrhea. I encouraged patient to reach out if she would like to try an alternative agent. Options to consider include gabapentin, Lyrica, venlafaxine, nortriptyline. Venlafaxine may be a good option if she'd like something to help anxiety.      Plan:  - Wean off of Cymbalta due to concern for diarrhea side effect  - Consider alternative agents as above for neuropathic pain     Follow up in Neurology clinic as needed should new concerns arise.    Stanley Cosme MD   of Neurology  [facility]

## 2023-06-06 ENCOUNTER — OFFICE VISIT (OUTPATIENT)
Dept: NEUROLOGY | Facility: CLINIC | Age: 63
End: 2023-06-06
Payer: COMMERCIAL

## 2023-06-06 VITALS
HEART RATE: 69 BPM | DIASTOLIC BLOOD PRESSURE: 81 MMHG | SYSTOLIC BLOOD PRESSURE: 133 MMHG | WEIGHT: 166.3 LBS | BODY MASS INDEX: 24.63 KG/M2

## 2023-06-06 DIAGNOSIS — G62.9 AXONAL POLYNEUROPATHY: Primary | ICD-10-CM

## 2023-06-06 PROCEDURE — 99214 OFFICE O/P EST MOD 30 MIN: CPT | Performed by: INTERNAL MEDICINE

## 2023-06-06 NOTE — LETTER
6/6/2023         RE: Bel Cruz  [address] [facility] [id number]        Dear Colleague,    Thank you for referring your patient, Bel Cruz, to the [facility] [place]. Please see a copy of my visit note below.    [facility] Neurology Follow Up Visit    Bel Cruz MRN# [id number]   Age: 62 year old YOB: 1960     Brief history of symptoms: The patient was initially seen in neurologic consultation on 1/3/2023 for evaluation of neuropathy. Please see the comprehensive neurologic consultation note from that date in the Epic records for details.     Interval history:   Patient started Cymbalta and this was very helpful for both uncomfortable neuropathy symptoms and anxiety. Unfortunately about a month after starting Cymbalta she developed diarrhea. This has fluctuated in intensity. More recently she has started imodium, which has helped diarrhea.      Patient denies any balance problems.       Past Medical History:     Patient Active Problem List   Diagnosis     Hypothyroidism     Generalized anxiety disorder     Atrophic vaginitis     History of colonic polyps     Past Medical History:   Diagnosis Date     Allergic rhinitis due to other allergen      Benign essential hypertension 6/10/2020     Esophageal reflux      GENERALIZED ANXIETY DIS 6/27/2007     Panic disorder without agoraphobia 1990     Trochanteric bursitis of right hip 8/2/2019     Unspecified hypothyroidism     Graves - s/p ablation        Past Surgical History:     Past Surgical History:   Procedure Laterality Date     BIOPSY  1999    Breast biopsy     COLONOSCOPY  7/26/2011    Procedure:COMBINED COLONOSCOPY, REMOVE TUMOR/POLYP/LESION BY SNARE; single polyp removal; Surgeon:YUE LIMON; Location:[facility]     COLONOSCOPY Left 6/24/2016    Procedure: COMBINED COLONOSCOPY, SINGLE OR MULTIPLE BIOPSY/POLYPECTOMY BY BIOPSY;  Surgeon: Joseph Keyes MD;  Location: [facility]     COLONOSCOPY WITH  CO2 INSUFFLATION N/A 6/24/2016    Procedure: COLONOSCOPY WITH CO2 INSUFFLATION;  Surgeon: Joseph Keyes MD;  Location: [facility]     ESOPHAGOSCOPY, GASTROSCOPY, DUODENOSCOPY (EGD), COMBINED N/A 5/8/2017    Procedure: COMBINED ESOPHAGOSCOPY, GASTROSCOPY, DUODENOSCOPY (EGD), BIOPSY SINGLE OR MULTIPLE;  ESOPHAGOSCOPY, GASTROSCOPY, DUODENOSCOPY (EGD) wiith multiple biopsies;  Surgeon: Chavez Land MD;  Location:  GI     EYE SURGERY  6/2/20    Cataract Surgery both eyes     GYN SURGERY  2005    Ablation of uterus.     HC HYSTEROSCOPY DIAGOSTIC (SEPARATE PROC)  8/10/2004    Hysteroscopy, D&C, Endometrial ablation     PELVIS LAPAROSCOPY,DX  1996    Pelvic pain - adhesions and mild endometriosis     US BREAST CLIP PLACEMENT W BIOPSY LEFT          Social History:     Social History     Tobacco Use     Smoking status: Never     Smokeless tobacco: Never     Tobacco comments:     no smokers in household   Vaping Use     Vaping status: Never Used     Passive vaping exposure: Yes   Substance Use Topics     Alcohol use: No     Drug use: No        Family History:     Family History   Problem Relation Age of Onset     Hyperlipidemia Mother      Neuropathy Mother      Thyroid Disease Father         Hashimotos     Hypertension Father      Sjogren's Paternal Grandmother      Thyroid Disease Son      Sjogren's Paternal Aunt         Medications:     Current Outpatient Medications   Medication Sig     DULoxetine (CYMBALTA) 30 MG capsule Take 1 capsule (30 mg) by mouth daily     levothyroxine (SYNTHROID/LEVOTHROID) 112 MCG tablet TAKE 1 TABLET(112 MCG) BY MOUTH DAILY     Multiple Vitamins-Minerals (WOMENS MULTIVITAMIN PLUS PO) Take 1 tablet by mouth daily. Over 50      No current facility-administered medications for this visit.        Allergies:     Allergies   Allergen Reactions     Amoxicillin      tightness in throat     Cephalosporins      ceftin     Clindamycin Base      Nausea, lightheadeness     Erythromycin       Welts     Macrolides And Ketolides      Sulfa Antibiotics Swelling     bactrim     Sulfamethoxazole      Trimethoprim         Review of Systems:   As above     Physical Exam:   Vitals: /81 (BP Location: Right arm, Patient Position: Sitting, Cuff Size: Adult Regular)   Pulse 69   Wt 75.4 kg (166 lb 4.8 oz)   LMP 07/18/2005   BMI 24.63 kg/m     General: Seated comfortably in no acute distress.  Lungs: breathing comfortably  Neurologic:     Mental Status: Fully alert, attentive. Normal memory and fund of knowledge. Language normal, speech clear and fluent, no paraphasic errors.      Cranial Nerves: Visual fields intact. PERRL. EOMI with normal smooth pursuit. Facial sensation intact/symmetric. Facial movements symmetric. Hearing not formally tested but intact to conversation. Palate elevation symmetric, uvula midline. No dysarthria. Shoulder shrug strong bilaterally. Tongue protrusion midline.     Motor: No tremors or other abnormal movements observed. Muscle tone normal throughout. Strength 5/5 throughout upper and lower extremities.      Right Left   Shoulder abduction        5 5   Elbow extension 5 5   Elbow flexion 5 5   Wrist extension         5 5   Finger extension 5 5   ADM 5 5   FDI 5 5   APB 5 5   Hip flexion 5 5   Knee flexion 5 5   Knee extension 5 5   Dorsiflexion 5 5   Plantar flexion 5 5        Deep Tendon Reflexes: 1+/symmetric throughout upper extremities and lower extremities. No clonus. Toes downgoing bilaterally.     Sensory: Decreased sensation to light touch in toes and anterior bottoms of feet. Temperature sensation is intact throughout. Vibration sense is ~3 seconds in the left great toe and ~2 seconds in the right great toe. Proprioception is intact throughout.      Coordination: Finger-nose-finger and heel-shin intact without dysmetria.      Gait: Steady casual gait.      Data reviewed on previous visits    Imaging:  MRI lumbar 2019  Impression: Stable findings since 12/27/2016.  1.  Mild lumbar spondylosis without significant spinal canal stenosis.  Mild right neural foraminal narrowing at L4-5.  2. Mild anterolisthesis of L4 and L5.     Procedures:  EMG 7/2022  Abnormal study.   --There is electrodiagnostic evidence suggestive of a mild sensorimotor, length dependent, predominantly axonal polyneuropathy.  --There is no electrodiagnostic evidence of median or ulnar mononeuropathy in either upper extremity.  --There is no electrodiagnostic evidence of right upper or right lower extremity radiculopathy.    Laboratory:  B12 385, TSH/CBC normal (7/2022)  CRP normal (2/2022)  2021 outside labs - reportedly positive NIKOS, but negative sjogrens, dsDNA    Pertinent Investigations since last visit:   Labs 1/2023 - A1c 5.7, normal ELP/immunofixation, B6, unremarkable CMP         Assessment and Plan:   Assessment:  Bel Cruz is a 62 year old female who presents today for follow-up of mild sensorimotor length dependent axonal polyneuropathy. She has history of minimal elevation in A1c, otherwise neuropathy work-up was unremarkable. She has had been with Cymbalta, but unfortunately has developed significant diarrhea as a potential side effect. We discussed weaning off of Cymbalta and seeing if diarrhea improves. If it doesn't improved additional work-up may need to be pursued for etiology of diarrhea. I encouraged patient to reach out if she would like to try an alternative agent. Options to consider include gabapentin, Lyrica, venlafaxine, nortriptyline. Venlafaxine may be a good option if she'd like something to help anxiety.      Plan:  - Wean off of Cymbalta due to concern for diarrhea side effect  - Consider alternative agents as above for neuropathic pain     Follow up in Neurology clinic as needed should new concerns arise.    Stanley Cosme MD   of Neurology  [facility]    Again, thank you for allowing me to participate in the care of your patient.         Sincerely,        Stanley Csome MD

## 2023-06-29 ENCOUNTER — E-VISIT (OUTPATIENT)
Dept: FAMILY MEDICINE | Facility: OTHER | Age: 63
End: 2023-06-29
Payer: COMMERCIAL

## 2023-06-29 DIAGNOSIS — G62.9 AXONAL POLYNEUROPATHY: ICD-10-CM

## 2023-06-29 PROCEDURE — 99421 OL DIG E/M SVC 5-10 MIN: CPT | Performed by: FAMILY MEDICINE

## 2023-06-29 RX ORDER — DULOXETIN HYDROCHLORIDE 30 MG/1
30 CAPSULE, DELAYED RELEASE ORAL DAILY
Qty: 90 CAPSULE | Refills: 1 | Status: SHIPPED | OUTPATIENT
Start: 2023-06-29 | End: 2023-12-26

## 2023-08-30 DIAGNOSIS — E03.9 HYPOTHYROIDISM, UNSPECIFIED TYPE: ICD-10-CM

## 2023-08-31 RX ORDER — LEVOTHYROXINE SODIUM 112 UG/1
112 TABLET ORAL DAILY
Qty: 90 TABLET | Refills: 0 | Status: SHIPPED | OUTPATIENT
Start: 2023-08-31 | End: 2023-11-28

## 2023-11-07 ENCOUNTER — PATIENT OUTREACH (OUTPATIENT)
Dept: GASTROENTEROLOGY | Facility: CLINIC | Age: 63
End: 2023-11-07
Payer: COMMERCIAL

## 2023-11-28 DIAGNOSIS — E03.9 HYPOTHYROIDISM, UNSPECIFIED TYPE: ICD-10-CM

## 2023-11-28 RX ORDER — LEVOTHYROXINE SODIUM 112 UG/1
112 TABLET ORAL DAILY
Qty: 90 TABLET | Refills: 0 | Status: SHIPPED | OUTPATIENT
Start: 2023-11-28 | End: 2024-01-30

## 2023-12-26 DIAGNOSIS — G62.9 AXONAL POLYNEUROPATHY: ICD-10-CM

## 2023-12-26 RX ORDER — DULOXETIN HYDROCHLORIDE 30 MG/1
30 CAPSULE, DELAYED RELEASE ORAL DAILY
Qty: 90 CAPSULE | Refills: 1 | Status: SHIPPED | OUTPATIENT
Start: 2023-12-26 | End: 2024-01-30

## 2024-01-24 ENCOUNTER — TRANSFERRED RECORDS (OUTPATIENT)
Dept: HEALTH INFORMATION MANAGEMENT | Facility: CLINIC | Age: 64
End: 2024-01-24
Payer: COMMERCIAL

## 2024-01-30 ENCOUNTER — OFFICE VISIT (OUTPATIENT)
Dept: FAMILY MEDICINE | Facility: OTHER | Age: 64
End: 2024-01-30
Payer: COMMERCIAL

## 2024-01-30 VITALS
OXYGEN SATURATION: 96 % | SYSTOLIC BLOOD PRESSURE: 130 MMHG | HEART RATE: 78 BPM | TEMPERATURE: 97.1 F | DIASTOLIC BLOOD PRESSURE: 84 MMHG

## 2024-01-30 DIAGNOSIS — M79.10 MYALGIA: ICD-10-CM

## 2024-01-30 DIAGNOSIS — N95.2 ATROPHIC VAGINITIS: ICD-10-CM

## 2024-01-30 DIAGNOSIS — Z12.31 VISIT FOR SCREENING MAMMOGRAM: ICD-10-CM

## 2024-01-30 DIAGNOSIS — E03.9 HYPOTHYROIDISM, UNSPECIFIED TYPE: ICD-10-CM

## 2024-01-30 DIAGNOSIS — Z00.00 ROUTINE GENERAL MEDICAL EXAMINATION AT A HEALTH CARE FACILITY: Primary | ICD-10-CM

## 2024-01-30 DIAGNOSIS — R19.7 DIARRHEA, UNSPECIFIED TYPE: ICD-10-CM

## 2024-01-30 DIAGNOSIS — Z12.4 CERVICAL CANCER SCREENING: ICD-10-CM

## 2024-01-30 DIAGNOSIS — G62.9 AXONAL POLYNEUROPATHY: ICD-10-CM

## 2024-01-30 LAB
ALBUMIN SERPL BCG-MCNC: 4.5 G/DL (ref 3.5–5.2)
ALP SERPL-CCNC: 81 U/L (ref 40–150)
ALT SERPL W P-5'-P-CCNC: 22 U/L (ref 0–70)
ANION GAP SERPL CALCULATED.3IONS-SCNC: 15 MMOL/L (ref 7–15)
AST SERPL W P-5'-P-CCNC: 22 U/L (ref 0–45)
BILIRUB SERPL-MCNC: 0.3 MG/DL
BUN SERPL-MCNC: 13.3 MG/DL (ref 8–23)
CALCIUM SERPL-MCNC: 9.4 MG/DL (ref 8.8–10.2)
CHLORIDE SERPL-SCNC: 101 MMOL/L (ref 98–107)
CK SERPL-CCNC: 75 U/L (ref 26–308)
CREAT SERPL-MCNC: 0.96 MG/DL (ref 0.51–1.17)
CRP SERPL-MCNC: <3 MG/L
DEPRECATED HCO3 PLAS-SCNC: 25 MMOL/L (ref 22–29)
EGFRCR SERPLBLD CKD-EPI 2021: 66 ML/MIN/1.73M2
ERYTHROCYTE [SEDIMENTATION RATE] IN BLOOD BY WESTERGREN METHOD: 7 MM/HR (ref 0–30)
GLUCOSE SERPL-MCNC: 100 MG/DL (ref 70–99)
MAGNESIUM SERPL-MCNC: 2.1 MG/DL (ref 1.7–2.3)
POTASSIUM SERPL-SCNC: 3.9 MMOL/L (ref 3.4–5.3)
PROT SERPL-MCNC: 8 G/DL (ref 6.4–8.3)
SODIUM SERPL-SCNC: 141 MMOL/L (ref 135–145)
TSH SERPL DL<=0.005 MIU/L-ACNC: 0.8 UIU/ML (ref 0.3–4.2)

## 2024-01-30 PROCEDURE — 80053 COMPREHEN METABOLIC PANEL: CPT | Performed by: FAMILY MEDICINE

## 2024-01-30 PROCEDURE — 99396 PREV VISIT EST AGE 40-64: CPT | Mod: 25 | Performed by: FAMILY MEDICINE

## 2024-01-30 PROCEDURE — 87624 HPV HI-RISK TYP POOLED RSLT: CPT | Performed by: FAMILY MEDICINE

## 2024-01-30 PROCEDURE — 85652 RBC SED RATE AUTOMATED: CPT | Performed by: FAMILY MEDICINE

## 2024-01-30 PROCEDURE — 86140 C-REACTIVE PROTEIN: CPT | Performed by: FAMILY MEDICINE

## 2024-01-30 PROCEDURE — G0123 SCREEN CERV/VAG THIN LAYER: HCPCS | Performed by: FAMILY MEDICINE

## 2024-01-30 PROCEDURE — 84443 ASSAY THYROID STIM HORMONE: CPT | Performed by: FAMILY MEDICINE

## 2024-01-30 PROCEDURE — 99214 OFFICE O/P EST MOD 30 MIN: CPT | Mod: 25 | Performed by: FAMILY MEDICINE

## 2024-01-30 PROCEDURE — 83735 ASSAY OF MAGNESIUM: CPT | Performed by: FAMILY MEDICINE

## 2024-01-30 PROCEDURE — 82550 ASSAY OF CK (CPK): CPT | Performed by: FAMILY MEDICINE

## 2024-01-30 PROCEDURE — 36415 COLL VENOUS BLD VENIPUNCTURE: CPT | Performed by: FAMILY MEDICINE

## 2024-01-30 RX ORDER — LEVOTHYROXINE SODIUM 112 UG/1
112 TABLET ORAL DAILY
Qty: 90 TABLET | Refills: 3 | Status: SHIPPED | OUTPATIENT
Start: 2024-01-30

## 2024-01-30 RX ORDER — ESTRADIOL 10 UG/1
10 INSERT VAGINAL
Qty: 24 TABLET | Refills: 3 | Status: SHIPPED | OUTPATIENT
Start: 2024-02-01

## 2024-01-30 RX ORDER — DULOXETIN HYDROCHLORIDE 30 MG/1
30 CAPSULE, DELAYED RELEASE ORAL DAILY
Qty: 90 CAPSULE | Refills: 3 | Status: SHIPPED | OUTPATIENT
Start: 2024-01-30

## 2024-01-30 ASSESSMENT — ENCOUNTER SYMPTOMS
DIARRHEA: 1
HEMATURIA: 0
ARTHRALGIAS: 1
SHORTNESS OF BREATH: 0
PARESTHESIAS: 0
HEMATOCHEZIA: 0
DYSURIA: 0
ABDOMINAL PAIN: 0
CHILLS: 0
DIZZINESS: 0
CONSTIPATION: 0
NAUSEA: 0
BREAST MASS: 0
WEAKNESS: 0
HEARTBURN: 0
JOINT SWELLING: 0
MYALGIAS: 1
PALPITATIONS: 0
SORE THROAT: 0
NERVOUS/ANXIOUS: 0
HEADACHES: 0
FEVER: 0
EYE PAIN: 0
COUGH: 0
FREQUENCY: 0

## 2024-01-30 ASSESSMENT — ANXIETY QUESTIONNAIRES
IF YOU CHECKED OFF ANY PROBLEMS ON THIS QUESTIONNAIRE, HOW DIFFICULT HAVE THESE PROBLEMS MADE IT FOR YOU TO DO YOUR WORK, TAKE CARE OF THINGS AT HOME, OR GET ALONG WITH OTHER PEOPLE: NOT DIFFICULT AT ALL
6. BECOMING EASILY ANNOYED OR IRRITABLE: NOT AT ALL
4. TROUBLE RELAXING: NOT AT ALL
8. IF YOU CHECKED OFF ANY PROBLEMS, HOW DIFFICULT HAVE THESE MADE IT FOR YOU TO DO YOUR WORK, TAKE CARE OF THINGS AT HOME, OR GET ALONG WITH OTHER PEOPLE?: NOT DIFFICULT AT ALL
GAD7 TOTAL SCORE: 0
2. NOT BEING ABLE TO STOP OR CONTROL WORRYING: NOT AT ALL
GAD7 TOTAL SCORE: 0
7. FEELING AFRAID AS IF SOMETHING AWFUL MIGHT HAPPEN: NOT AT ALL
7. FEELING AFRAID AS IF SOMETHING AWFUL MIGHT HAPPEN: NOT AT ALL
3. WORRYING TOO MUCH ABOUT DIFFERENT THINGS: NOT AT ALL
1. FEELING NERVOUS, ANXIOUS, OR ON EDGE: NOT AT ALL
5. BEING SO RESTLESS THAT IT IS HARD TO SIT STILL: NOT AT ALL

## 2024-01-30 ASSESSMENT — PAIN SCALES - GENERAL: PAINLEVEL: NO PAIN (0)

## 2024-01-30 NOTE — PROGRESS NOTES
Preventive Care Visit  [facility]  Lala Falcon MD, Family Medicine  2024       SUBJECTIVE:   Laura is a 63 year old, presenting for the following:  Physical      Healthy Habits:     Getting at least 3 servings of Calcium per day:  Yes    Bi-annual eye exam:  Yes    Dental care twice a year:  Yes    Sleep apnea or symptoms of sleep apnea:  None    Diet:  Regular (no restrictions)    Frequency of exercise:  2-3 days/week    Duration of exercise:  15-30 minutes    Taking medications regularly:  Yes    Medication side effects:  Not applicable    Additional concerns today:  Yes      Today's PHQ-2 Score:       2024     2:35 PM   PHQ-2 (  [employer])   Q1: Little interest or pleasure in doing things 0   Q2: Feeling down, depressed or hopeless 0   PHQ-2 Score 0   Q1: Little interest or pleasure in doing things Not at all   Q2: Feeling down, depressed or hopeless Not at all   PHQ-2 Score 0         Social History     Tobacco Use    Smoking status: Never    Smokeless tobacco: Never    Tobacco comments:     no smokers in household   Substance Use Topics    Alcohol use: No             2024     2:35 PM   Alcohol Use   Prescreen: >3 drinks/day or >7 drinks/week? No     Reviewed orders with patient.  Reviewed health maintenance and updated orders accordingly - Yes      Breast Cancer Screenin/18/2022     2:48 PM 2024     2:35 PM   Breast CA Risk Assessment (FHS-7)   Do you have a family history of breast, colon, or ovarian cancer? No / Unknown No / Unknown       Mammogram Screening: Recommended mammography every 1-2 years with patient discussion and risk factor consideration  Pertinent mammograms are reviewed under the imaging tab.    History of abnormal Pap smear: NO - age 30-65 PAP every 5 years with negative HPV co-testing recommended      Latest Ref Rng & Units 5/3/2019     3:15 PM 5/3/2019     2:56 PM 10/17/2014    12:00 AM   PAP / HPV   PAP (Historical)   NIL   "NIL    HPV 16 DNA NEG^Negative Negative      HPV 18 DNA NEG^Negative Negative      Other HR HPV NEG^Negative Negative        Reviewed and updated as needed this visit by clinical staff   Tobacco  Allergies  Meds  Problems  Med Hx  Surg Hx  Fam Hx          Reviewed and updated as needed this visit by Provider   Tobacco  Allergies  Meds  Problems  Med Hx  Surg Hx  Fam Hx            Review of Systems   Constitutional:  Negative for chills and fever.   HENT:  Negative for congestion, ear pain, hearing loss and sore throat.    Eyes:  Negative for pain and visual disturbance.   Respiratory:  Negative for cough and shortness of breath.    Cardiovascular:  Negative for chest pain and palpitations.   Gastrointestinal:  Positive for diarrhea. Negative for abdominal pain, constipation and nausea.   Genitourinary:  Negative for dysuria, frequency, genital sores, hematuria, pelvic pain, urgency, vaginal bleeding and vaginal discharge.   Musculoskeletal:  Positive for arthralgias and myalgias. Negative for joint swelling.   Skin:  Negative for rash.   Neurological:  Negative for dizziness, weakness and headaches.   Psychiatric/Behavioral:  The patient is not nervous/anxious.        OBJECTIVE:   /84 (BP Location: Left arm, Patient Position: Sitting, Cuff Size: Adult Regular)   Pulse 78   Temp 97.1  F (36.2  C) (Temporal)   LMP 07/18/2005   SpO2 96%    Estimated body mass index is 24.63 kg/m  as calculated from the following:    Height as of 1/27/23: 1.75 m (5' 8.9\").    Weight as of 6/6/23: 75.4 kg (166 lb 4.8 oz).  Physical Exam  Constitutional:       General: Laura Cruz is not in acute distress.     Appearance: Laura Cruz is well-developed.   HENT:      Right Ear: Tympanic membrane and external ear normal.      Left Ear: Tympanic membrane and external ear normal.      Nose: Nose normal.      Mouth/Throat:      Pharynx: No oropharyngeal exudate.   Eyes:      General:         Right eye: No discharge.    "      Left eye: No discharge.      Conjunctiva/sclera: Conjunctivae normal.      Pupils: Pupils are equal, round, and reactive to light.   Neck:      Thyroid: No thyromegaly.   Cardiovascular:      Rate and Rhythm: Normal rate and regular rhythm.      Heart sounds: Normal heart sounds, S1 normal and S2 normal. No murmur heard.  Pulmonary:      Effort: Pulmonary effort is normal. No respiratory distress.      Breath sounds: Normal breath sounds. No wheezing or rales.   Chest:   Breasts:     Right: No mass, nipple discharge or tenderness.      Left: No mass, nipple discharge or tenderness.   Abdominal:      General: Bowel sounds are normal.      Palpations: Abdomen is soft. There is no mass.      Tenderness: There is no abdominal tenderness.   Genitourinary:     Cervix: No cervical motion tenderness or discharge.   Musculoskeletal:         General: Normal range of motion.      Cervical back: Neck supple.   Lymphadenopathy:      Cervical: No cervical adenopathy.   Skin:     General: Skin is warm and dry.      Findings: No rash.   Neurological:      Mental Status: Laura Cruz is alert and oriented to person, place, and time.      Motor: No abnormal muscle tone.      Deep Tendon Reflexes: Reflexes are normal and symmetric.   Psychiatric:         Mood and Affect: Mood normal.         Behavior: Behavior normal.         Thought Content: Thought content normal.         Judgment: Judgment normal.         ASSESSMENT/PLAN:   Routine general medical examination at a health care facility    Cervical cancer screening  - Pap Screen with HPV - recommended age 30 - 65 years    Hypothyroidism, unspecified type  Stable.  Labs drawn.  Refills given.    - TSH WITH FREE T4 REFLEX; Future  - levothyroxine (SYNTHROID/LEVOTHROID) 112 MCG tablet; Take 1 tablet (112 mcg) by mouth daily    Axonal polyneuropathy  She is found that Cymbalta has been very effective for her pain from her neuropathy as well as helping control her anxiety and stress.   Refills given.    - DULoxetine (CYMBALTA) 30 MG capsule; Take 1 capsule (30 mg) by mouth daily    Visit for screening mammogram  - *MA Screening Digital Bilateral; Future    Myalgia  She is complaining of pain in bilateral forearms which are in the muscle.  She denies any pain in her elbows.  She has some mild pain in her right wrist and thumb which is independent from that in her forearm.  She denies numbness or tingling.  She denies weakness.  Her EMG was negative for ulnar and or median neuropathies when done but this was prior to her having the forearm pain.  She also complains of muscle cramps in her calves.  Will check labs.    - CRP, inflammation; Future  - ESR: Erythrocyte sedimentation rate; Future  - Comprehensive metabolic panel (BMP + Alb, Alk Phos, ALT, AST, Total. Bili, TP); Future  - CK total; Future  - Magnesium; Future    Atrophic vaginitis  She has a lot of vaginal dryness and would like to try estrogen.  We discussed trying topical estrogen and she states that in the past it was too expensive but she would like to see if this is any cheaper.    - estradiol (VAGIFEM) 10 MCG TABS vaginal tablet; Place 1 tablet (10 mcg) vaginally twice a week    Diarrhea, unspecified type  Since starting the duloxetine she has had intermittent diarrhea.  She states her stools can be very loose but yet she feels well.  Stool studies last year were normal.  She had colonoscopy prior to her symptoms in 2022 and is due for another one in 2027.  She does feel that Imodium is helpful when she takes it.  We discussed that we could consider GI consult and possibly another colonoscopy, she declines at this time but if it worsens she will let me know.      Counseling  Reviewed preventive health counseling, as reflected in patient instructions        Laura Cruz reports that Laura Cruz has never smoked. Laura Cruz has never used smokeless tobacco.        Signed Electronically by: Lala Falcon MD  Answers submitted  by the patient for this visit:  HUMBERTO-7 (Submitted on 1/30/2024)  HUMBERTO 7 TOTAL SCORE: 0  Annual Preventive Visit (Submitted on 1/30/2024)  Chief Complaint: Annual Exam:  Blood in stool: No  heartburn: No  peripheral edema: No  mood changes: No  Skin sensation changes: No  tenderness: No  breast mass: No  breast discharge: No

## 2024-01-31 ENCOUNTER — MYC MEDICAL ADVICE (OUTPATIENT)
Dept: FAMILY MEDICINE | Facility: OTHER | Age: 64
End: 2024-01-31
Payer: COMMERCIAL

## 2024-02-05 LAB
BKR LAB AP GYN ADEQUACY: NORMAL
BKR LAB AP GYN INTERPRETATION: NORMAL
BKR LAB AP HPV REFLEX: NORMAL
BKR LAB AP PREVIOUS ABNORMAL: NORMAL
PATH REPORT.COMMENTS IMP SPEC: NORMAL
PATH REPORT.COMMENTS IMP SPEC: NORMAL
PATH REPORT.RELEVANT HX SPEC: NORMAL

## 2024-02-07 LAB
HUMAN PAPILLOMA VIRUS 16 DNA: NEGATIVE
HUMAN PAPILLOMA VIRUS 18 DNA: NEGATIVE
HUMAN PAPILLOMA VIRUS FINAL DIAGNOSIS: NORMAL
HUMAN PAPILLOMA VIRUS OTHER HR: NEGATIVE

## 2024-03-06 ENCOUNTER — ANCILLARY PROCEDURE (OUTPATIENT)
Dept: MAMMOGRAPHY | Facility: OTHER | Age: 64
End: 2024-03-06
Attending: FAMILY MEDICINE
Payer: COMMERCIAL

## 2024-03-06 DIAGNOSIS — Z12.31 VISIT FOR SCREENING MAMMOGRAM: ICD-10-CM

## 2024-03-06 PROCEDURE — 77067 SCR MAMMO BI INCL CAD: CPT | Mod: TC | Performed by: RADIOLOGY

## 2024-03-06 PROCEDURE — 77063 BREAST TOMOSYNTHESIS BI: CPT | Mod: TC | Performed by: RADIOLOGY

## 2024-03-27 ENCOUNTER — TRANSFERRED RECORDS (OUTPATIENT)
Dept: HEALTH INFORMATION MANAGEMENT | Facility: CLINIC | Age: 64
End: 2024-03-27
Payer: COMMERCIAL

## 2024-03-28 ENCOUNTER — TRANSFERRED RECORDS (OUTPATIENT)
Dept: HEALTH INFORMATION MANAGEMENT | Facility: CLINIC | Age: 64
End: 2024-03-28
Payer: COMMERCIAL

## 2024-04-10 ENCOUNTER — TRANSFERRED RECORDS (OUTPATIENT)
Dept: HEALTH INFORMATION MANAGEMENT | Facility: CLINIC | Age: 64
End: 2024-04-10
Payer: COMMERCIAL

## 2024-05-06 ENCOUNTER — TRANSFERRED RECORDS (OUTPATIENT)
Dept: HEALTH INFORMATION MANAGEMENT | Facility: CLINIC | Age: 64
End: 2024-05-06
Payer: COMMERCIAL

## 2024-06-19 ENCOUNTER — TRANSFERRED RECORDS (OUTPATIENT)
Dept: HEALTH INFORMATION MANAGEMENT | Facility: CLINIC | Age: 64
End: 2024-06-19
Payer: COMMERCIAL

## 2024-06-27 ENCOUNTER — TRANSFERRED RECORDS (OUTPATIENT)
Dept: HEALTH INFORMATION MANAGEMENT | Facility: CLINIC | Age: 64
End: 2024-06-27
Payer: COMMERCIAL

## 2024-08-21 ENCOUNTER — TRANSFERRED RECORDS (OUTPATIENT)
Dept: HEALTH INFORMATION MANAGEMENT | Facility: CLINIC | Age: 64
End: 2024-08-21
Payer: COMMERCIAL

## 2024-12-30 ENCOUNTER — TRANSFERRED RECORDS (OUTPATIENT)
Dept: HEALTH INFORMATION MANAGEMENT | Facility: CLINIC | Age: 64
End: 2024-12-30
Payer: COMMERCIAL

## 2025-01-14 ENCOUNTER — TRANSFERRED RECORDS (OUTPATIENT)
Dept: HEALTH INFORMATION MANAGEMENT | Facility: CLINIC | Age: 65
End: 2025-01-14
Payer: COMMERCIAL

## 2025-01-26 SDOH — HEALTH STABILITY: PHYSICAL HEALTH: ON AVERAGE, HOW MANY DAYS PER WEEK DO YOU ENGAGE IN MODERATE TO STRENUOUS EXERCISE (LIKE A BRISK WALK)?: 3 DAYS

## 2025-01-26 SDOH — HEALTH STABILITY: PHYSICAL HEALTH: ON AVERAGE, HOW MANY MINUTES DO YOU ENGAGE IN EXERCISE AT THIS LEVEL?: 30 MIN

## 2025-01-26 ASSESSMENT — SOCIAL DETERMINANTS OF HEALTH (SDOH): HOW OFTEN DO YOU GET TOGETHER WITH FRIENDS OR RELATIVES?: THREE TIMES A WEEK

## 2025-01-28 ENCOUNTER — OFFICE VISIT (OUTPATIENT)
Dept: FAMILY MEDICINE | Facility: OTHER | Age: 65
End: 2025-01-28
Payer: COMMERCIAL

## 2025-01-28 VITALS
DIASTOLIC BLOOD PRESSURE: 80 MMHG | HEART RATE: 78 BPM | HEIGHT: 68 IN | BODY MASS INDEX: 27.74 KG/M2 | RESPIRATION RATE: 15 BRPM | TEMPERATURE: 97.1 F | WEIGHT: 183 LBS | SYSTOLIC BLOOD PRESSURE: 114 MMHG | OXYGEN SATURATION: 98 %

## 2025-01-28 DIAGNOSIS — M25.50 MULTIPLE JOINT PAIN: ICD-10-CM

## 2025-01-28 DIAGNOSIS — E03.9 HYPOTHYROIDISM, UNSPECIFIED TYPE: ICD-10-CM

## 2025-01-28 DIAGNOSIS — G62.9 AXONAL POLYNEUROPATHY: ICD-10-CM

## 2025-01-28 DIAGNOSIS — F41.1 GENERALIZED ANXIETY DISORDER: ICD-10-CM

## 2025-01-28 DIAGNOSIS — K58.0 IRRITABLE BOWEL SYNDROME WITH DIARRHEA: ICD-10-CM

## 2025-01-28 DIAGNOSIS — Z00.00 ENCOUNTER FOR MEDICARE ANNUAL WELLNESS EXAM: Primary | ICD-10-CM

## 2025-01-28 DIAGNOSIS — Z78.0 ASYMPTOMATIC POSTMENOPAUSAL STATUS: ICD-10-CM

## 2025-01-28 DIAGNOSIS — Z13.220 LIPID SCREENING: ICD-10-CM

## 2025-01-28 LAB
ALBUMIN SERPL BCG-MCNC: 4.4 G/DL (ref 3.5–5.2)
ALP SERPL-CCNC: 79 U/L (ref 40–150)
ALT SERPL W P-5'-P-CCNC: 21 U/L (ref 0–70)
ANION GAP SERPL CALCULATED.3IONS-SCNC: 10 MMOL/L (ref 7–15)
AST SERPL W P-5'-P-CCNC: 22 U/L (ref 0–45)
BILIRUB SERPL-MCNC: 0.6 MG/DL
BUN SERPL-MCNC: 16.7 MG/DL (ref 8–23)
CALCIUM SERPL-MCNC: 9.1 MG/DL (ref 8.8–10.4)
CHLORIDE SERPL-SCNC: 102 MMOL/L (ref 98–107)
CHOLEST SERPL-MCNC: 215 MG/DL
CREAT SERPL-MCNC: 0.88 MG/DL (ref 0.51–1.17)
CRP SERPL-MCNC: 3.77 MG/L
EGFRCR SERPLBLD CKD-EPI 2021: 73 ML/MIN/1.73M2
ERYTHROCYTE [DISTWIDTH] IN BLOOD BY AUTOMATED COUNT: 12.3 % (ref 10–15)
ERYTHROCYTE [SEDIMENTATION RATE] IN BLOOD BY WESTERGREN METHOD: 18 MM/HR (ref 0–30)
FASTING STATUS PATIENT QL REPORTED: YES
FASTING STATUS PATIENT QL REPORTED: YES
GLUCOSE SERPL-MCNC: 94 MG/DL (ref 70–99)
HCO3 SERPL-SCNC: 27 MMOL/L (ref 22–29)
HCT VFR BLD AUTO: 42.9 % (ref 35–53)
HDLC SERPL-MCNC: 73 MG/DL
HGB BLD-MCNC: 14.4 G/DL (ref 11.7–17.7)
LDLC SERPL CALC-MCNC: 123 MG/DL
MCH RBC QN AUTO: 29.1 PG (ref 26.5–33)
MCHC RBC AUTO-ENTMCNC: 33.6 G/DL (ref 31.5–36.5)
MCV RBC AUTO: 87 FL (ref 78–100)
NONHDLC SERPL-MCNC: 142 MG/DL
PLATELET # BLD AUTO: 193 10E3/UL (ref 150–450)
POTASSIUM SERPL-SCNC: 3.9 MMOL/L (ref 3.4–5.3)
PROT SERPL-MCNC: 7.4 G/DL (ref 6.4–8.3)
RBC # BLD AUTO: 4.94 10E6/UL (ref 3.8–5.9)
RHEUMATOID FACT SERPL-ACNC: 63 IU/ML
SODIUM SERPL-SCNC: 139 MMOL/L (ref 135–145)
TRIGL SERPL-MCNC: 93 MG/DL
TSH SERPL DL<=0.005 MIU/L-ACNC: 2.08 UIU/ML (ref 0.3–4.2)
WBC # BLD AUTO: 3.5 10E3/UL (ref 4–11)

## 2025-01-28 RX ORDER — LEVOTHYROXINE SODIUM 112 UG/1
112 TABLET ORAL DAILY
Qty: 90 TABLET | Refills: 3 | Status: SHIPPED | OUTPATIENT
Start: 2025-01-28

## 2025-01-28 RX ORDER — DULOXETIN HYDROCHLORIDE 30 MG/1
30 CAPSULE, DELAYED RELEASE ORAL DAILY
Qty: 90 CAPSULE | Refills: 3 | Status: SHIPPED | OUTPATIENT
Start: 2025-01-28

## 2025-01-28 RX ORDER — CHLORAL HYDRATE 500 MG
2 CAPSULE ORAL DAILY
COMMUNITY

## 2025-01-28 NOTE — PROGRESS NOTES
Preventive Care Visit  [facility]  Lala Falcon MD, Family Medicine  Jan 28, 2025      Assessment & Plan     Encounter for Medicare annual wellness exam    Asymptomatic postmenopausal status  Agreeable to bone density screening    - DEXA HIP/PELVIS/SPINE - Future; Future    Hypothyroidism, unspecified type  Continues on levothyroxine.  Will check TSH.    - TSH WITH FREE T4 REFLEX; Future  - levothyroxine (SYNTHROID/LEVOTHROID) 112 MCG tablet; Take 1 tablet (112 mcg) by mouth daily.    Generalized anxiety disorder/Axonal polyneuropathy  Was placed on duloxetine by neurology for axonal polyneuropathy.  She does feel that it has been helpful with the nerve pain but she really has noticed a difference with her anxiety.    - DULoxetine (CYMBALTA) 30 MG capsule; Take 1 capsule (30 mg) by mouth daily.    Irritable bowel syndrome with diarrhea  She has noticed over the last couple of years that she has been having irregular bowels.  She feels that she has multiple loose stools and intermittent diarrhea.  She went through stool samples couple of years ago.  She denies mucus or blood in her stools.  Will check labs including screening for celiac and food allergies.  Will give her a low FODMAP diet.  Last colonoscopy was in 2022.  If symptoms worsen could consider repeating colonoscopy earlier than the 5-year recommendation.    - Comprehensive metabolic panel (BMP + Alb, Alk Phos, ALT, AST, Total. Bili, TP); Future  - Allergy adult food panel; Future  - IgA [LAB73]; Future  - Deamidated Giladin Peptide Denisa IgA IgG [[id number]]; Future  - Tissue transglutaminase denisa IgA and IgG [[id number]]; Future  - Endomysial Antibody IgA by IFA [[id number]]; Future    Multiple joint pain  She has had multiple joint pains especially in her hands.  She has had elevated rheumatoid factor for years and did see rheumatology in 2022 but I am unable to see that note.  She states she has had issues with her hands and has  "had improvement with steroid injections but that is starting to wear off.  Will repeat her rheumatologic studies and refer back to rheumatology.    - Cyclic Citrullinated Peptide Antibody IgG; Future  - Anti Nuclear Manuela IgG by IFA with Reflex; Future  - ESR: Erythrocyte sedimentation rate; Future  - CRP, inflammation; Future  - Rheumatoid factor; Future  - Adult Rheumatology  Referral; Future  - CBC with platelets; Future    Lipid screening  - Lipid panel reflex to direct LDL Fasting; Future    BMI  Estimated body mass index is 27.83 kg/m  as calculated from the following:    Height as of this encounter: 1.727 m (5' 8\").    Weight as of this encounter: 83 kg (183 lb).       Counseling  Appropriate preventive services were addressed with this patient via screening, questionnaire, or discussion as appropriate for fall prevention, nutrition, physical activity, Tobacco-use cessation, social engagement, weight loss and cognition.  Checklist reviewing preventive services available has been given to the patient.  Reviewed patient's diet, addressing concerns and/or questions.   Laura is at risk for lack of exercise and has been provided with information to increase physical activity for the benefit of Laura's well-being.   Patient reported safety concerns were addressed today.      Subjective   Laura is a 64 year old, presenting for the following:  Medicare Visit        1/28/2025     9:18 AM   Additional Questions   Roomed by doris MCCONNELL    Health Care Directive  Patient does not have a Health Care Directive: Discussed advance care planning with patient; information given to patient to review.      1/26/2025   General Health   How would you rate your overall physical health? Good   Feel stress (tense, anxious, or unable to sleep) Not at all         1/26/2025   Nutrition   Diet: Regular (no restrictions)         1/26/2025   Exercise   Days per week of moderate/strenous exercise 3 days   Average minutes spent " exercising at this level 30 min         1/26/2025   Social Factors   Frequency of gathering with friends or relatives Three times a week   Worry food won't last until get money to buy more No   Food not last or not have enough money for food? No   Do you have housing? (Housing is defined as stable permanent housing and does not include staying ouside in a car, in a tent, in an abandoned building, in an overnight shelter, or couch-surfing.) Yes   Are you worried about losing your housing? No   Lack of transportation? No   Unable to get utilities (heat,electricity)? No         1/26/2025   Fall Risk   Fallen 2 or more times in the past year? No   Trouble with walking or balance? No          1/26/2025   Activities of Daily Living- Home Safety   Needs help with the following daily activites None of the above   Safety concerns in the home Throw rugs in the hallway    No grab bars in the bathroom       Multiple values from one day are sorted in reverse-chronological order         1/26/2025   Dental   Dentist two times every year? Yes         1/26/2025   Hearing Screening   Hearing concerns? None of the above         1/26/2025   Driving Risk Screening   Patient/family members have concerns about driving No         1/26/2025   General Alertness/Fatigue Screening   Have you been more tired than usual lately? No         1/26/2025   Urinary Incontinence Screening   Bothered by leaking urine in past 6 months No         1/26/2025   TB Screening   Were you born outside of the [place]? No         Today's PHQ-2 Score:       1/28/2025     9:10 AM   PHQ-2 ( 1999 Pf[employer])   Q1: Little interest or pleasure in doing things 0   Q2: Feeling down, depressed or hopeless 0   PHQ-2 Score 0    Q1: Little interest or pleasure in doing things Not at all   Q2: Feeling down, depressed or hopeless Not at all   PHQ-2 Score 0       Patient-reported           1/26/2025   Substance Use   Alcohol more than 3/day or more than 7/wk Not Applicable   Do you have a  current opioid prescription? No   How severe/bad is pain from 1 to 10? 1/10   Do you use any other substances recreationally? No     Social History     Tobacco Use    Smoking status: Never    Smokeless tobacco: Never    Tobacco comments:     no smokers in household   Vaping Use    Vaping status: Never Used   Substance Use Topics    Alcohol use: No    Drug use: No           3/6/2024   LAST FHS-7 RESULTS   1st degree relative breast or ovarian cancer No   Any relative bilateral breast cancer No   Any male have breast cancer No   Any ONE woman have BOTH breast AND ovarian cancer No   Any woman with breast cancer before 50yrs No   2 or more relatives with breast AND/OR ovarian cancer No   2 or more relatives with breast AND/OR bowel cancer No        Mammogram Screening - Mammogram every 1-2 years updated in Health Maintenance based on mutual decision making      History of abnormal Pap smear: No - age 65 or older with adequate negative prior screening test results (3 consecutive negative cytology results, 2 consecutive negative cotesting results, or 2 consecutive negative HrHPV test results within 10 years, with the most recent test occurring within the recommended screening interval for the test used)        Latest Ref Rng & Units 1/30/2024     3:09 PM 5/3/2019     3:15 PM 5/3/2019     2:56 PM   PAP / HPV   PAP  Negative for Intraepithelial Lesion or Malignancy (NILM)      PAP (Historical)    NIL    HPV 16 DNA Negative Negative  Negative     HPV 18 DNA Negative Negative  Negative     Other HR HPV Negative Negative  Negative       ASCVD Risk   The 10-year ASCVD risk score (Lio GOMES, et al., 2019) is: 4%    Values used to calculate the score:      Age: 64 years      Sex: Female      Is Non- : No      Diabetic: No      Tobacco smoker: No      Systolic Blood Pressure: 114 mmHg      Is BP treated: No      HDL Cholesterol: 76 mg/dL      Total Cholesterol: 210 mg/dL          Reviewed and  "updated as needed this visit by Provider   Tobacco  Allergies  Meds  Problems  Med Hx  Surg Hx  Fam Hx            Current providers sharing in care for this patient include:  Patient Care Team:  Lala Falcon MD as PCP - General  Lala Falcon MD as Assigned PCP    The following health maintenance items are reviewed in Epic and correct as of today:  Health Maintenance   Topic Date Due    Pneumococcal Vaccine: 50+ Years (1 of 1 - PCV) Never done    ZOSTER IMMUNIZATION (1 of 2) Never done    DEXA  08/19/2023    INFLUENZA VACCINE (1) 09/01/2024    COVID-19 Vaccine (4 - 2024-25 season) 09/01/2024    TSH W/FREE T4 REFLEX  01/30/2025    ANNUAL REVIEW OF HM ORDERS  01/30/2025    MEDICARE ANNUAL WELLNESS VISIT  01/28/2026    FALL RISK ASSESSMENT  01/28/2026    MAMMO SCREENING  03/06/2026    ADVANCE CARE PLANNING  05/11/2026    DTAP/TDAP/TD IMMUNIZATION (3 - Td or Tdap) 01/17/2027    GLUCOSE  01/30/2027    LIPID  07/05/2027    COLORECTAL CANCER SCREENING  08/19/2027    RSV VACCINE (1 - 1-dose 75+ series) 09/08/2034    HEPATITIS C SCREENING  Completed    PHQ-2 (once per calendar year)  Completed    HEPATITIS B IMMUNIZATION  Completed    HPV IMMUNIZATION  Aged Out    MENINGITIS IMMUNIZATION  Aged Out    RSV MONOCLONAL ANTIBODY  Aged Out    HIV SCREENING  Discontinued    PAP  Discontinued    HEPATITIS A IMMUNIZATION  Discontinued          Objective    Exam  /80 (BP Location: Right arm, Patient Position: Sitting, Cuff Size: Adult Regular)   Pulse 78   Temp 97.1  F (36.2  C) (Temporal)   Resp 15   Ht 1.727 m (5' 8\")   Wt 83 kg (183 lb)   LMP 07/18/2005   SpO2 98%   BMI 27.83 kg/m     Estimated body mass index is 27.83 kg/m  as calculated from the following:    Height as of this encounter: 1.727 m (5' 8\").    Weight as of this encounter: 83 kg (183 lb).    Physical Exam  Constitutional:       General: Laura is not in acute distress.     Appearance: Laura is well-developed.   HENT:      Right Ear: " Tympanic membrane and external ear normal.      Left Ear: Tympanic membrane and external ear normal.      Nose: Nose normal.   Eyes:      General:         Right eye: No discharge.         Left eye: No discharge.      Conjunctiva/sclera: Conjunctivae normal.   Neck:      Thyroid: No thyroid mass.   Cardiovascular:      Rate and Rhythm: Normal rate and regular rhythm.      Heart sounds: Normal heart sounds, S1 normal and S2 normal. No murmur heard.  Pulmonary:      Effort: Pulmonary effort is normal. No respiratory distress.      Breath sounds: Normal breath sounds. No wheezing or rales.   Abdominal:      General: Bowel sounds are normal.      Palpations: Abdomen is soft. There is no mass.      Tenderness: There is no abdominal tenderness.   Musculoskeletal:         General: Normal range of motion.      Cervical back: Neck supple.   Lymphadenopathy:      Cervical: No cervical adenopathy.   Skin:     General: Skin is warm and dry.      Findings: No rash.   Neurological:      Mental Status: Laura is alert and oriented to person, place, and time.   Psychiatric:         Mood and Affect: Mood normal.         Behavior: Behavior normal.         Thought Content: Thought content normal.         Judgment: Judgment normal.               1/28/2025   Mini Cog   Clock Draw Score 2 Normal   3 Item Recall 3 objects recalled   Mini Cog Total Score 5         Vision Screen  Patient wears corrective lenses (select all that apply): Worn during vision screen  Vision Screen Results: Pass      Signed Electronically by: Lala Falcon MD

## 2025-01-28 NOTE — PATIENT INSTRUCTIONS
Patient Education   Preventive Care Advice   This is general advice given by our system to help you stay healthy. However, your care team may have specific advice just for you. Please talk to your care team about your preventive care needs.  Nutrition  Eat 5 or more servings of fruits and vegetables each day.  Try wheat bread, brown rice and whole grain pasta (instead of white bread, rice, and pasta).  Get enough calcium and vitamin D. Check the label on foods and aim for 100% of the RDA (recommended daily allowance).  Lifestyle  Exercise at least 150 minutes each week  (30 minutes a day, 5 days a week).  Do muscle strengthening activities 2 days a week. These help control your weight and prevent disease.  No smoking.  Wear sunscreen to prevent skin cancer.  Have a dental exam and cleaning every 6 months.  Yearly exams  See your health care team every year to talk about:  Any changes in your health.  Any medicines your care team has prescribed.  Preventive care, family planning, and ways to prevent chronic diseases.  Shots (vaccines)   HPV shots (up to age 26), if you've never had them before.  Hepatitis B shots (up to age 59), if you've never had them before.  COVID-19 shot: Get this shot when it's due.  Flu shot: Get a flu shot every year.  Tetanus shot: Get a tetanus shot every 10 years.  Pneumococcal, hepatitis A, and RSV shots: Ask your care team if you need these based on your risk.  Shingles shot (for age 50 and up)  General health tests  Diabetes screening:  Starting at age 35, Get screened for diabetes at least every 3 years.  If you are younger than age 35, ask your care team if you should be screened for diabetes.  Cholesterol test: At age 39, start having a cholesterol test every 5 years, or more often if advised.  Bone density scan (DEXA): At age 50, ask your care team if you should have this scan for osteoporosis (brittle bones).  Hepatitis C: Get tested at least once in your life.  STIs (sexually  transmitted infections)  Before age 24: Ask your care team if you should be screened for STIs.  After age 24: Get screened for STIs if you're at risk. You are at risk for STIs (including HIV) if:  You are sexually active with more than one person.  You don't use condoms every time.  You or a partner was diagnosed with a sexually transmitted infection.  If you are at risk for HIV, ask about PrEP medicine to prevent HIV.  Get tested for HIV at least once in your life, whether you are at risk for HIV or not.  Cancer screening tests  Cervical cancer screening: If you have a cervix, begin getting regular cervical cancer screening tests starting at age 21.  Breast cancer scan (mammogram): If you've ever had breasts, begin having regular mammograms starting at age 40. This is a scan to check for breast cancer.  Colon cancer screening: It is important to start screening for colon cancer at age 45.  Have a colonoscopy test every 10 years (or more often if you're at risk) Or, ask your provider about stool tests like a FIT test every year or Cologuard test every 3 years.  To learn more about your testing options, visit:   .  For help making a decision, visit:   https://bit.ly/af03756.  Prostate cancer screening test: If you have a prostate, ask your care team if a prostate cancer screening test (PSA) at age 55 is right for you.  Lung cancer screening: If you are a current or former smoker ages 50 to 80, ask your care team if ongoing lung cancer screenings are right for you.  For informational purposes only. Not to replace the advice of your health care provider. Copyright   2023 [facility]. All rights reserved. Clinically reviewed by the [facility]. [employer] 616522 - REV 01/24.  Learning About Activities of Daily Living  What are activities of daily living?     Activities of daily living (ADLs) are the basic self-care tasks you do every day. These include eating, bathing, dressing,  and moving around.  As you age, and if you have health problems, you may find that it's harder to do some of these tasks. If so, your doctor can suggest ideas that may help.  To measure what kind of help you may need, your doctor will ask how well you are able to do ADLs. Let your doctor know if there are any tasks that you are having trouble doing. This is an important first step to getting help. And when you have the help you need, you can stay as independent as possible.  How will a doctor assess your ADLs?  Asking about ADLs is part of a routine health checkup your doctor will likely do as you age. Your health check might be done in a doctor's office, in your home, or at a hospital. The goal is to find out if you are having any problems that could make it hard to care for yourself or that make it unsafe for you to be on your own.  To measure your ADLs, your doctor will ask how hard it is for you to do routine tasks. Your doctor may also want to know if you have changed the way you do a task because of a health problem. Your doctor may watch how you:  Walk back and forth.  Keep your balance while you stand or walk.  Move from sitting to standing or from a bed to a chair.  Button or unbutton a shirt or sweater.  Remove and put on your shoes.  It's common to feel a little worried or anxious if you find you can't do all the things you used to be able to do. Talking with your doctor about ADLs is a way to make sure you're as safe as possible and able to care for yourself as well as you can. You may want to bring a caregiver, friend, or family member to your checkup. They can help you talk to your doctor.  Follow-up care is a key part of your treatment and safety. Be sure to make and go to all appointments, and call your doctor if you are having problems. It's also a good idea to know your test results and keep a list of the medicines you take.  Current as of: October 24, 2023  Content Version: 14.3    2024 [facility]  [employer].   Care instructions adapted under license by your healthcare professional. If you have questions about a medical condition or this instruction, always ask your healthcare professional. [facility] [employer] disclaims any warranty or liability for your use of this information.

## 2025-01-29 LAB
ALMOND IGE QN: <0.1 KU(A)/L
ANA SER QL IF: NEGATIVE
CASHEW NUT IGE QN: <0.1 KU(A)/L
CODFISH IGE QN: <0.1 KU(A)/L
COW MILK IGE QN: <0.1 KU(A)/L
EGG WHITE IGE QN: <0.1 KU(A)/L
HAZELNUT IGE QN: <0.1 KU(A)/L
IGA SERPL-MCNC: 225 MG/DL (ref 84–499)
IGE SERPL-ACNC: 5 KU/L (ref 0–114)
PEANUT IGE QN: <0.1 KU(A)/L
SALMON IGE QN: <0.1 KU(A)/L
SCALLOP IGE QN: <0.1 KU(A)/L
SESAME SEED IGE QN: <0.1 KU(A)/L
SHRIMP IGE QN: <0.1 KU(A)/L
SOYBEAN IGE QN: <0.1 KU(A)/L
TUNA IGE QN: <0.1 KU(A)/L
WALNUT IGE QN: <0.1 KU(A)/L
WHEAT IGE QN: <0.1 KU(A)/L

## 2025-01-30 LAB
CCP AB SER IA-ACNC: 1.8 U/ML
GLIADIN IGA SER-ACNC: 1.7 U/ML
GLIADIN IGG SER-ACNC: 0.9 U/ML
TTG IGA SER-ACNC: 1.6 U/ML
TTG IGG SER-ACNC: 0.7 U/ML

## 2025-01-31 ENCOUNTER — HOSPITAL ENCOUNTER (OUTPATIENT)
Dept: BONE DENSITY | Facility: CLINIC | Age: 65
Discharge: HOME | End: 2025-01-31
Attending: FAMILY MEDICINE | Admitting: FAMILY MEDICINE
Payer: COMMERCIAL

## 2025-01-31 DIAGNOSIS — Z78.0 ASYMPTOMATIC POSTMENOPAUSAL STATUS: ICD-10-CM

## 2025-01-31 PROCEDURE — 77080 DXA BONE DENSITY AXIAL: CPT

## 2025-03-07 NOTE — TELEPHONE ENCOUNTER
Bel Cruz is a 57 year old female who calls with post dental procedure weakness.    NURSING ASSESSMENT:  Description:  Spoke with patient,  Woke up worn out.  Limbs felt heavy.   Had dental procedure: Crown put on  Couldn't get her in for this week and was dealing with an oral tooth infection.   Looking for advice on what to try or do.   Onset/duration: was seen by oral surgeon yesterday: drained the infection  Overnight the Infection has gone down and is improving.  Pain scale (0-10)   4/10 varies: controlled with OTC and RX: 800 mg ibprofen with tylenol / Given hydrocodone  Hasn't eaten a lot this past week. Drinking ok, still urinating  Has noticed a complete change in energy level. Feels more worn out since yesterday. Has been forcing herself to move and eat  On Clindamycin, when asked about allergies to this medication her response was: no rashes, only nausea.   No fever: now 98.1    Does have root canal planned for tomorrow    Allergies:   Allergies   Allergen Reactions     Amoxicillin      tightness in throat     Cephalosporins      ceftin     Clindamycin Base      rash     Erythromycin      Welts     Macrolides      Sulfa Drugs      bactrim     MEDICATIONS:   Taking medication(s) as prescribed? Yes  Taking over the counter medication(s?) Yes  Any medication side effects? No significant side effects    Any barriers to taking medication(s) as prescribed?  No  Medication(s) improving/managing symptoms?  Yes  Medication reconciliation completed: Yes    NURSING PLAN: Nursing advice to patient continue home cares, if worsening needs to be seen.  try gatorade or propel also  Soft foods    RECOMMENDED DISPOSITION:  Home care advice -   Will comply with recommendation: Yes  If further questions/concerns or if symptoms do not improve, worsen or new symptoms develop, call your PCP or [facility] as soon as possible.    Guideline used:weakness, mouth problems  Telephone Triage Protocols for Nurses,  Fifth Edition, Sindi Galo, RN, BSN       110

## 2025-03-27 ENCOUNTER — ANCILLARY PROCEDURE (OUTPATIENT)
Dept: GENERAL RADIOLOGY | Facility: OTHER | Age: 65
End: 2025-03-27
Attending: PHYSICIAN ASSISTANT
Payer: COMMERCIAL

## 2025-03-27 ENCOUNTER — OFFICE VISIT (OUTPATIENT)
Dept: FAMILY MEDICINE | Facility: OTHER | Age: 65
End: 2025-03-27
Payer: COMMERCIAL

## 2025-03-27 VITALS
DIASTOLIC BLOOD PRESSURE: 88 MMHG | HEART RATE: 86 BPM | SYSTOLIC BLOOD PRESSURE: 130 MMHG | HEIGHT: 69 IN | OXYGEN SATURATION: 97 % | WEIGHT: 186 LBS | TEMPERATURE: 97.7 F | RESPIRATION RATE: 15 BRPM | BODY MASS INDEX: 27.55 KG/M2

## 2025-03-27 DIAGNOSIS — Z23 NEED FOR SHINGLES VACCINE: ICD-10-CM

## 2025-03-27 DIAGNOSIS — L50.9 URTICARIA: ICD-10-CM

## 2025-03-27 DIAGNOSIS — L50.9 HIVES: ICD-10-CM

## 2025-03-27 DIAGNOSIS — M25.561 RIGHT KNEE PAIN, UNSPECIFIED CHRONICITY: Primary | ICD-10-CM

## 2025-03-27 DIAGNOSIS — M25.561 RIGHT KNEE PAIN, UNSPECIFIED CHRONICITY: ICD-10-CM

## 2025-03-27 ASSESSMENT — PAIN SCALES - GENERAL: PAINLEVEL_OUTOF10: SEVERE PAIN (7)

## 2025-03-27 NOTE — PROGRESS NOTES
"  Assessment & Plan     Right knee pain, unspecified chronicity -= concerns for meniscal injury  Positive Marti's test on the right raises concern for medial meniscal involvement.  Physical therapy to evaluate and treat.  X-rays pending at time of dictation.  Follow-up with orthopedic specialist in the near future is an option as well.  Would consider an MRI in 4 to 6 weeks if she fails physical therapy.  - XR Knee Bilateral 1/2 Views; Future  - Physical Therapy  Referral; Future    Hives  Urticaria  Etiology uncertain.  Advised over-the-counter allergy medications and observation.  She has made lots of changes with respect to The potentials of food and contact dermatitis elimination.  Follow-up with primary care as needed.    Need for shingles vaccine  Advise consideration of this in the future.          BMI  Estimated body mass index is 27.47 kg/m  as calculated from the following:    Height as of this encounter: 1.753 m (5' 9\").    Weight as of this encounter: 84.4 kg (186 lb).   Weight management plan: Patient was referred to their PCP to discuss a diet and exercise plan.    Subjective   Laura is a 64 year old, presenting for the following health issues:  Pain      3/27/2025     3:41 PM   Additional Questions   Roomed by gabriele     Pain    History of Present Illness       Reason for visit:  Hives for 2 weeks. Severe pain in the back of my thigh to my knee when i sleep or sit in cerain chairs. Increase in painfulness  Symptom onset:  More than a month  Symptoms include:  Radiating pain from right knee. Specifically in and above my knee in back. Knee is sensitive to direct pressure. Also hices all over my torso inspite of taking benadryl and Claritan. They comd and go.  Symptom intensity:  Severe  Symptom progression:  Worsening  Had these symptoms before:  No  What makes it worse:  Lying down and do etimes sitting  What makes it better:  Standing   Laura is taking medications regularly.        Review of " "Systems  Constitutional, HEENT, cardiovascular, pulmonary, GI, , musculoskeletal, neuro, skin, endocrine and psych systems are negative, except as otherwise noted.      Objective    /88 (BP Location: Right arm, Cuff Size: Adult Regular)   Pulse 86   Temp 97.7  F (36.5  C) (Temporal)   Resp 15   Ht 1.753 m (5' 9\")   Wt 84.4 kg (186 lb)   LMP 07/18/2005   SpO2 97%   BMI 27.47 kg/m    Body mass index is 27.47 kg/m .  Physical Exam   GENERAL: alert and no distress  RESP: lungs clear to auscultation - no rales, rhonchi or wheezes  CV: regular rate and rhythm, normal S1 S2, no S3 or S4, no murmur, click or rub, no peripheral edema  MS: no gross musculoskeletal defects noted, no edema  MS: tenderness to palpation the knee joint more medially than laterally today., gait normal, no ataxia, and positive Marti's test to the right medial knee.  SKIN: no suspicious lesions or rashes to exposed visible skin today.  She shows me some pictures of fairly classic urticaria to the right chest wall underneath the right breast and to her left chin from over the past couple of days.  NEURO: Normal strength and tone, mentation intact and speech normal  PSYCH: mentation appears normal, affect normal/bright          Signed Electronically by: Antoine Schneider PA-C    "

## 2025-04-03 ENCOUNTER — E-VISIT (OUTPATIENT)
Dept: FAMILY MEDICINE | Facility: OTHER | Age: 65
End: 2025-04-03
Payer: COMMERCIAL

## 2025-04-03 ENCOUNTER — MYC MEDICAL ADVICE (OUTPATIENT)
Dept: FAMILY MEDICINE | Facility: OTHER | Age: 65
End: 2025-04-03

## 2025-04-03 ENCOUNTER — TELEPHONE (OUTPATIENT)
Dept: FAMILY MEDICINE | Facility: OTHER | Age: 65
End: 2025-04-03

## 2025-04-03 DIAGNOSIS — L50.9 HIVES: Primary | ICD-10-CM

## 2025-04-03 RX ORDER — FAMOTIDINE 20 MG/1
20 TABLET, FILM COATED ORAL 2 TIMES DAILY
Qty: 30 TABLET | Refills: 1 | Status: SHIPPED | OUTPATIENT
Start: 2025-04-03

## 2025-04-03 RX ORDER — CETIRIZINE HYDROCHLORIDE 10 MG/1
10 TABLET ORAL 2 TIMES DAILY
Qty: 60 TABLET | Refills: 1 | Status: SHIPPED | OUTPATIENT
Start: 2025-04-03

## 2025-04-03 NOTE — TELEPHONE ENCOUNTER
Called to clarify with patient. She is sending this additional information to go along with her e-visit. Routing to PCP to review e-visit and this additional information.     Latrice CURRANN, RN

## 2025-04-03 NOTE — TELEPHONE ENCOUNTER
Order/Referral Request    Who is requesting: pt    Orders being requested: PT    Reason service is needed/diagnosis:     When are orders needed by: asap    Has this been discussed with Provider: Yes    Does patient have a preference on a Group/Provider/Facility? [phone]    Does patient have an appointment scheduled?: Yes: 4/17    Where to send orders: Fax [phone]    Could we send this information to you in [employer] or would you prefer to receive a phone call?:   Patient would like to be contacted via [employer]

## 2025-04-17 ENCOUNTER — TRANSFERRED RECORDS (OUTPATIENT)
Dept: HEALTH INFORMATION MANAGEMENT | Facility: CLINIC | Age: 65
End: 2025-04-17
Payer: COMMERCIAL

## 2025-04-18 ENCOUNTER — TELEPHONE (OUTPATIENT)
Dept: ALLERGY | Facility: OTHER | Age: 65
End: 2025-04-18
Payer: COMMERCIAL

## 2025-04-18 NOTE — TELEPHONE ENCOUNTER
[facility] Call Center    Phone Message    May a detailed message be left on voicemail: no     Reason for Call: Other: Is taking Zyrtec right now.  Hives are all over/ lump in throat- Call: [phone] Laura     Action Taken: Other: ER Allergy     Travel Screening: Not Applicable     Date of Service:

## 2025-04-21 NOTE — TELEPHONE ENCOUNTER
Spoke with patient.  Let her know she does not need to stop her zyrtec for her new patient appointment with Dr Anderson if it causes her hives to flare up.  Clarified she is not having trouble swallowing or breathing at this time, she says she is concerned because it has happened in the past.  Recommended if she feels that she develops throat swelling, trouble swallowing or breathing she should go in to ED.  She understands.    Rebekah Gautam MSN, RN   [facility], 4/21/2025 9:09 AM

## 2025-04-23 NOTE — PATIENT INSTRUCTIONS
Cetirizine 10 mg by mouth once daily in the morning  Fexofenadine (Allegra) 180 mg by mouth at bedtime  Famotidine 20 mg by mouth twice daily.          Dr Anderson [phone]    All visits for food challenges, venom allergy testing, and medication/drug challenges MUST be scheduled through the allergy clinic nurse. Please send a Airc[employer] message or call the allergy scheduling line and ask to speak with Dr Anderson's team for scheduling these appointments. Appointments for these visits that are made through the schedulers or via [employer] may be cancelled or rescheduled.    Allergy Shot Room ([employer] [facility] [facility]): [phone]    Pulmonary Function [phone]    Prescription Assistance  If you need assistance with your prescriptions (cost, coverage, etc) please contact: [facility] [phone]

## 2025-04-23 NOTE — PROGRESS NOTES
SUBJECTIVE:                                                                   Bel Cruz is a 64-year-old adult presents today to our Allergy Clinic at [facility]; Laura is being seen in consultation at the request of Dr.Tori CHRISTOPHER Falcon for an evaluation of urticaria.   The patient, developed hives approximately six weeks ago (March), primarily affecting the trunk and arms, while generally sparing the extremities, face, and neck. Hives were noted on the face only once.    They report occasional episodes of globus sensation, described as a lump with a scratchy feeling without significant tightening, which improves with oral antihistamines.  The urticaria typically resolves within hours after taking cetirizine. There are no associated symptoms such as bruising, other discoloration, recurrent fevers, unexpected weight loss, emesis, or diarrhea. Hives can occur early in the morning, even before eating.    The patient currently takes cetirizine 10 mg by mouth once daily, but symptom control is inconsistent. Attempts to increase cetirizine to 10 mg twice daily resulted in bothersome somnolence, so they prefer to limit intake to once daily.  History is notable for Graves' disease. They have not noticed any worsening of hives with NSAID use.  The patient showed me several pictures of skin lesions on their smartphone, which are consistent with urticaria.      Patient Active Problem List   Diagnosis    Hypothyroidism    Generalized anxiety disorder    Atrophic vaginitis    History of colonic polyps    Irritable bowel syndrome with diarrhea    Hives    Urticaria    Right knee pain, unspecified chronicity       Past Medical History:   Diagnosis Date    Allergic rhinitis due to other allergen     Benign essential hypertension 6/10/2020    Esophageal reflux     GENERALIZED ANXIETY DIS 6/27/2007    Panic disorder without agoraphobia 1990    Trochanteric bursitis of right hip 8/2/2019    Unspecified  hypothyroidism     Graves - s/p ablation      Problem (# of Occurrences) Relation (Name,Age of Onset)    Hypertension (1) Father (Garo Quevedo)    Thyroid Disease (2) Father (Garo Quevedo): Hashimotos, Son (Kalpesh Cruz)    Hyperlipidemia (1) Mother (Aniya Epstein)    Sjogren's (2) Paternal Grandmother, Paternal Aunt    Neuropathy (1) Mother (Aniya Epstein)          Past Surgical History:   Procedure Laterality Date    BIOPSY  1999    Breast biopsy    COLONOSCOPY  7/26/2011    Procedure:COMBINED COLONOSCOPY, REMOVE TUMOR/POLYP/LESION BY SNARE; single polyp removal; Surgeon:YUE LIMON; Location:[facility]    COLONOSCOPY Left 6/24/2016    Procedure: COMBINED COLONOSCOPY, SINGLE OR MULTIPLE BIOPSY/POLYPECTOMY BY BIOPSY;  Surgeon: Joseph Keyes MD;  Location: [facility]    COLONOSCOPY WITH CO2 INSUFFLATION N/A 6/24/2016    Procedure: COLONOSCOPY WITH CO2 INSUFFLATION;  Surgeon: Joseph Keyes MD;  Location: [facility] OR    ESOPHAGOSCOPY, GASTROSCOPY, DUODENOSCOPY (EGD), COMBINED N/A 5/8/2017    Procedure: COMBINED ESOPHAGOSCOPY, GASTROSCOPY, DUODENOSCOPY (EGD), BIOPSY SINGLE OR MULTIPLE;  ESOPHAGOSCOPY, GASTROSCOPY, DUODENOSCOPY (EGD) wiith multiple biopsies;  Surgeon: Chavez Land MD;  Location:  GI    EYE SURGERY  6/2/20    Cataract Surgery both eyes    GYN SURGERY  2005    Ablation of uterus.    HC HYSTEROSCOPY DIAGOSTIC (SEPARATE PROC)  8/10/2004    Hysteroscopy, D&C, Endometrial ablation    PELVIS LAPAROSCOPY,DX  1996    Pelvic pain - adhesions and mild endometriosis    US BREAST CLIP PLACEMENT W BIOPSY LEFT       Social History     Socioeconomic History    Marital status:      Spouse name: Barrera    Number of children: 2    Years of education: 15    Highest education level: None   Occupational History    Occupation:      Employer:    Tobacco Use    Smoking status: Never    Smokeless tobacco: Never    Tobacco comments:     no smokers in household    Vaping Use    Vaping status: Never Used   Substance and Sexual Activity    Alcohol use: No    Drug use: No    Sexual activity: Not Currently     Partners: Male     Birth control/protection: None     Comment: vasectomy   Other Topics Concern     Service No    Blood Transfusions No    Caffeine Concern No     Comment:  no pop, 1 cup coffee every am    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    Hobby Hazards No    Sleep Concern No    Stress Concern No    Weight Concern No    Special Diet No    Back Care Yes    Exercise Yes     Comment: 3-4 times a week    Bike Helmet Yes     Comment: biking     Seat Belt Yes    Self-Exams Yes     Comment: knows BSE, periodically, mammogram done 9/2/09    Parent/sibling w/ CABG, MI or angioplasty before 65F 55M? No   Social History Narrative    April 29, 2025        ENVIRONMENTAL HISTORY: [place] The [place] is heated with a forced air. They does have central air conditioning. The patient's bedroom is furnished with carpeting in bedroom and fabric window coverings.  Pets inside the house include 1 cat(s). There is no history of cockroach or mice infestation. There is/are 0 smokers in the house.  The house does have a damp basement.      Social Drivers of Health     Financial Resource Strain: Low Risk  (1/26/2025)    Financial Resource Strain     Within the past 12 months, have you or your family members you live with been unable to get utilities (heat, electricity) when it was really needed?: No   Food Insecurity: Low Risk  (1/26/2025)    Food Insecurity     Within the past 12 months, did you worry that your food would run out before you got money to buy more?: No     Within the past 12 months, did the food you bought just not last and you didn t have money to get more?: No   Transportation Needs: Low Risk  (1/26/2025)    Transportation Needs     Within the past 12 months, has lack of transportation kept you  from medical appointments, getting your medicines, non-medical meetings or appointments, work, or from getting things that you need?: No   Physical Activity: Insufficiently Active (1/26/2025)    Exercise Vital Sign     Days of Exercise per Week: 3 days     Minutes of Exercise per Session: 30 min   Stress: No Stress Concern Present (1/26/2025)    [employer] - Occupational Stress Questionnaire     Feeling of Stress : Not at all   Social Connections: Unknown (1/26/2025)    Social Connection and Isolation Panel [NHANES]     Frequency of Social Gatherings with Friends and Family: Three times a week   Interpersonal Safety: Low Risk  (1/28/2025)    Interpersonal Safety     Do you feel physically and emotionally safe where you currently live?: Yes     Within the past 12 months, have you been hit, slapped, kicked or otherwise physically hurt by someone?: No     Within the past 12 months, have you been humiliated or emotionally abused in other ways by your partner or ex-partner?: No   Housing Stability: Low Risk  (1/26/2025)    Housing Stability     Do you have housing? : Yes     Are you worried about losing your housing?: No               Current Outpatient Medications:     cetirizine (ZYRTEC) 10 MG tablet, Take 1 tablet (10 mg) by mouth 2 times daily., Disp: 60 tablet, Rfl: 1    DULoxetine (CYMBALTA) 30 MG capsule, Take 1 capsule (30 mg) by mouth daily., Disp: 90 capsule, Rfl: 3    EPINEPHrine (ANY BX GENERIC EQUIV) 0.3 MG/0.3ML injection 2-pack, Inject 0.3 mLs (0.3 mg) into the muscle as needed for anaphylaxis. May repeat one time in 5-15 minutes if response to initial dose is inadequate., Disp: 2 each, Rfl: 2    fish oil-omega-3 fatty acids 1000 MG capsule, Take 2 g by mouth daily., Disp: , Rfl:     levothyroxine (SYNTHROID/LEVOTHROID) 112 MCG tablet, Take 1 tablet (112 mcg) by mouth daily., Disp: 90 tablet, Rfl: 3    vitamin B complex with vitamin C (VITAMIN  B COMPLEX) tablet, Take 1 tablet  by mouth daily., Disp: , Rfl:     famotidine (PEPCID) 20 MG tablet, Take 1 tablet (20 mg) by mouth 2 times daily. (Patient not taking: Reported on 4/29/2025), Disp: 30 tablet, Rfl: 1  Immunization History   Administered Date(s) Administered    COVID-19 Monovalent 18+ (Moderna) 02/04/2021, 04/21/2021, 01/03/2022    HEPA 07/07/1999, 02/21/2000    HepB 07/07/1999, 08/06/1999, 02/11/2000    Influenza (IIV3) PF 10/20/1997, 10/16/1998, 11/09/1999, 12/02/2002, 10/05/2010, 10/04/2011, 10/02/2012, 10/01/2013, 10/08/2014, 11/01/2015    Influenza (prior to 2024) 10/27/2009    Influenza Vaccine 18-64 (Flublok) 12/19/2019    Influenza Vaccine >6 months,quad, PF 09/30/2014, 10/10/2016, 09/27/2017, 10/11/2022, 10/26/2023    Influenza Vaccine, 6+MO IM (QUADRIVALENT W/PRESERVATIVES) 09/29/2015, 09/27/2017, 09/25/2018, 10/14/2021    Influenza,INJ,MDCK,PF,Quad >6mo(Flucelvax) 10/16/2020    TD,PF 7+ (Tenivac) 04/17/1996, 01/17/2017    TDAP (Adacel,Boostrix) 08/08/2006    Typhoid IM 08/01/1999     Allergies   Allergen Reactions    Amoxicillin      tightness in throat    Cephalosporins      ceftin    Clindamycin Base      Nausea, lightheadeness    Erythromycin      Welts    Macrolides And Ketolides     Sulfa Antibiotics Swelling     bactrim    Sulfamethoxazole     Trimethoprim      OBJECTIVE:                                                                 BP (!) 151/96   Pulse 72   Wt 85.8 kg (189 lb 2.5 oz)   LMP 07/18/2005   SpO2 100%   BMI 27.93 kg/m              Physical Exam  Vitals and nursing note reviewed.   Constitutional:       General: Laura is not in acute distress.     Appearance: Laura is not ill-appearing, toxic-appearing or diaphoretic.   HENT:      Head: Normocephalic and atraumatic.      Right Ear: Tympanic membrane, ear canal and external ear normal.      Left Ear: Tympanic membrane, ear canal and external ear normal.      Nose: No mucosal edema, congestion or rhinorrhea.      Right Turbinates: Not enlarged, swollen  or pale.      Left Turbinates: Not enlarged, swollen or pale.      Mouth/Throat:      Lips: Pink.      Mouth: Mucous membranes are moist.      Pharynx: Oropharynx is clear. No pharyngeal swelling, oropharyngeal exudate, posterior oropharyngeal erythema or uvula swelling.   Eyes:      General:         Right eye: No discharge.         Left eye: No discharge.      Conjunctiva/sclera: Conjunctivae normal.   Pulmonary:      Effort: Pulmonary effort is normal. No respiratory distress.      Breath sounds: Normal breath sounds and air entry. No stridor, decreased air movement or transmitted upper airway sounds. No decreased breath sounds, wheezing, rhonchi or rales.   Skin:     General: Skin is warm.      Comments: No visible urticaria or angioedema   Neurological:      Mental Status: Laura is alert and oriented to person, place, and time.   Psychiatric:         Mood and Affect: Mood normal.         Behavior: Behavior normal.            ASSESSMENT/PLAN:         Urticaria  Clinical presentation seems to be consistent with chronic spontaneous urticaria. Chronic urticaria is not due to a specific allergen.  The average duration of the disease is 2 to 5 years.  Reviewed exacerbating and concerning signs of chronic urticaria.    -Ordered CBC with differential, comprehensive metabolic panel, thyroid studies, including thyroid antibody levels, urticaria induced basophil activation, and IgE to alpha gal.  Globus sensation is unusual for chronic urticaria.  Considering that, I ordered baseline serum tryptase level, and epinephrine autoinjector.  Anaphylaxis action plan was reviewed and provided.    -Start cetirizine 10 mg by mouth in the morning and fexofenadine 180 mg by mouth at bedtime.  -Start famotidine 20 mg by mouth twice daily.  Depending on symptom control, we may need to discuss adding hydroxyzine as needed and/or montelukast daily.  If the patient is still symptomatic, will likely need omalizumab.    - Urticaria Induced  Basophil Activation  - CBC with Platelets & Differential  - Comprehensive metabolic panel  - Anti thyroglobulin antibody  - Thyroid peroxidase antibody  - Galactose alpha 1 3 Galactose IgE  - EPINEPHrine (ANY BX GENERIC EQUIV) 0.3 MG/0.3ML injection 2-pack  Dispense: 2 each; Refill: 2  - Tryptase      Follow-up in 2 months or sooner if needed.    Thank you for allowing us to participate in the care of this patient. Please feel free to contact us if there are any questions or concerns about the patient.    Disclaimer: This note consists of symbols derived from keyboarding, dictation and/or voice recognition software. As a result, there may be errors in the script that have gone undetected. Please consider this when interpreting information found in this chart.    Consent was obtained from the patient to use an AI documentation tool in the creation of this note.    Paramjit Anderson MD, FAAAAI, FACAAI  Allergy and Asthma     [facility]

## 2025-04-29 ENCOUNTER — OFFICE VISIT (OUTPATIENT)
Dept: ALLERGY | Facility: OTHER | Age: 65
End: 2025-04-29
Attending: FAMILY MEDICINE
Payer: COMMERCIAL

## 2025-04-29 VITALS
BODY MASS INDEX: 27.93 KG/M2 | OXYGEN SATURATION: 100 % | DIASTOLIC BLOOD PRESSURE: 96 MMHG | HEART RATE: 72 BPM | WEIGHT: 189.15 LBS | SYSTOLIC BLOOD PRESSURE: 151 MMHG

## 2025-04-29 DIAGNOSIS — L50.9 URTICARIA: Primary | ICD-10-CM

## 2025-04-29 LAB
ALBUMIN SERPL BCG-MCNC: 4.5 G/DL (ref 3.5–5.2)
ALP SERPL-CCNC: 86 U/L (ref 40–150)
ALT SERPL W P-5'-P-CCNC: 23 U/L (ref 0–70)
ANION GAP SERPL CALCULATED.3IONS-SCNC: 5 MMOL/L (ref 7–15)
AST SERPL W P-5'-P-CCNC: 24 U/L (ref 0–45)
BASOPHILS # BLD AUTO: 0 10E3/UL (ref 0–0.2)
BASOPHILS NFR BLD AUTO: 0 %
BILIRUB SERPL-MCNC: 0.3 MG/DL
BUN SERPL-MCNC: 13.8 MG/DL (ref 8–23)
CALCIUM SERPL-MCNC: 9.9 MG/DL (ref 8.8–10.4)
CHLORIDE SERPL-SCNC: 103 MMOL/L (ref 98–107)
CREAT SERPL-MCNC: 0.89 MG/DL (ref 0.51–1.17)
EGFRCR SERPLBLD CKD-EPI 2021: 72 ML/MIN/1.73M2
EOSINOPHIL # BLD AUTO: 0 10E3/UL (ref 0–0.7)
EOSINOPHIL NFR BLD AUTO: 0 %
ERYTHROCYTE [DISTWIDTH] IN BLOOD BY AUTOMATED COUNT: 12.3 % (ref 10–15)
GLUCOSE SERPL-MCNC: 99 MG/DL (ref 70–99)
HCO3 SERPL-SCNC: 32 MMOL/L (ref 22–29)
HCT VFR BLD AUTO: 43.2 % (ref 35–53)
HGB BLD-MCNC: 14.7 G/DL (ref 11.7–17.7)
IMM GRANULOCYTES # BLD: 0 10E3/UL
IMM GRANULOCYTES NFR BLD: 0 %
LYMPHOCYTES # BLD AUTO: 1.3 10E3/UL (ref 0.8–5.3)
LYMPHOCYTES NFR BLD AUTO: 27 %
MCH RBC QN AUTO: 29.6 PG (ref 26.5–33)
MCHC RBC AUTO-ENTMCNC: 34 G/DL (ref 31.5–36.5)
MCV RBC AUTO: 87 FL (ref 78–100)
MONOCYTES # BLD AUTO: 0.3 10E3/UL (ref 0–1.3)
MONOCYTES NFR BLD AUTO: 6 %
NEUTROPHILS # BLD AUTO: 3.3 10E3/UL (ref 1.6–8.3)
NEUTROPHILS NFR BLD AUTO: 67 %
PLATELET # BLD AUTO: 190 10E3/UL (ref 150–450)
POTASSIUM SERPL-SCNC: 4 MMOL/L (ref 3.4–5.3)
PROT SERPL-MCNC: 7.9 G/DL (ref 6.4–8.3)
RBC # BLD AUTO: 4.96 10E6/UL (ref 3.8–5.9)
SODIUM SERPL-SCNC: 140 MMOL/L (ref 135–145)
WBC # BLD AUTO: 5 10E3/UL (ref 4–11)

## 2025-04-29 PROCEDURE — 36415 COLL VENOUS BLD VENIPUNCTURE: CPT | Performed by: ALLERGY & IMMUNOLOGY

## 2025-04-29 PROCEDURE — 99204 OFFICE O/P NEW MOD 45 MIN: CPT | Performed by: ALLERGY & IMMUNOLOGY

## 2025-04-29 PROCEDURE — 3080F DIAST BP >= 90 MM HG: CPT | Performed by: ALLERGY & IMMUNOLOGY

## 2025-04-29 PROCEDURE — 85025 COMPLETE CBC W/AUTO DIFF WBC: CPT | Performed by: ALLERGY & IMMUNOLOGY

## 2025-04-29 PROCEDURE — 3077F SYST BP >= 140 MM HG: CPT | Performed by: ALLERGY & IMMUNOLOGY

## 2025-04-29 PROCEDURE — 86376 MICROSOMAL ANTIBODY EACH: CPT | Performed by: ALLERGY & IMMUNOLOGY

## 2025-04-29 PROCEDURE — 88184 FLOWCYTOMETRY/ TC 1 MARKER: CPT | Mod: 90 | Performed by: ALLERGY & IMMUNOLOGY

## 2025-04-29 PROCEDURE — 99000 SPECIMEN HANDLING OFFICE-LAB: CPT | Performed by: ALLERGY & IMMUNOLOGY

## 2025-04-29 PROCEDURE — 86800 THYROGLOBULIN ANTIBODY: CPT | Performed by: ALLERGY & IMMUNOLOGY

## 2025-04-29 PROCEDURE — 86003 ALLG SPEC IGE CRUDE XTRC EA: CPT | Mod: 90 | Performed by: ALLERGY & IMMUNOLOGY

## 2025-04-29 PROCEDURE — 80053 COMPREHEN METABOLIC PANEL: CPT | Performed by: ALLERGY & IMMUNOLOGY

## 2025-04-29 PROCEDURE — 88185 FLOWCYTOMETRY/TC ADD-ON: CPT | Mod: 90 | Performed by: ALLERGY & IMMUNOLOGY

## 2025-04-29 RX ORDER — FEXOFENADINE HCL 180 MG/1
180 TABLET ORAL AT BEDTIME
Qty: 30 TABLET | Refills: 5 | Status: SHIPPED | OUTPATIENT
Start: 2025-04-29

## 2025-04-29 RX ORDER — EPINEPHRINE 0.3 MG/.3ML
0.3 INJECTION SUBCUTANEOUS PRN
Qty: 2 EACH | Refills: 2 | Status: SHIPPED | OUTPATIENT
Start: 2025-04-29

## 2025-04-29 NOTE — LETTER
ANAPHYLAXIS ALLERGY PLAN    Name: Bel Cruz      :  1960    Allergy to:  spontaneous urticaria    Weight: 189 lbs 2.47 oz           Asthma:  No      Do not depend on antihistamines or inhalers (bronchodilators) to treat a severe reaction; USE EPINEPHRINE      MEDICATIONS/DOSES  Epinephrine:  EpiPen/Adrenaclick/Auvi-Q   Epinephrine dose:  0.3 mg IM  Antihistamine:  Zyrtec (Cetirizine)  Antihistamine dose:  20 mg   Other (e.g., inhaler-bronchodilator if wheezing):  none       ANAPHYLAXIS ALLERGY PLAN (Page 2)  Patient:  Bel Cruz  :  1960         Electronically signed on 2025 by:  Paramjit Anderson MD  Parent/Guardian Authorization Signature:  ___________________________ Date:    FORM PROVIDED [employer]Y OF FOOD ALLERGY RESEARCH & EDUCATION (FARE) ([email or web address]) 2017

## 2025-04-29 NOTE — LETTER
4/29/2025      Bel Cruz  [address] [facility] [address]      Dear Colleague,    Thank you for referring your patient, Bel Cruz, to the [facility]. Please see a copy of my visit note below.    SUBJECTIVE:                                                                   Bel Cruz is a 64-year-old adult presents today to our Allergy Clinic at [facility]; Laura is being seen in consultation at the request of Dr.Tori CHRISTOPHER Falcon for an evaluation of urticaria.   The patient, developed hives approximately six weeks ago (March), primarily affecting the trunk and arms, while generally sparing the extremities, face, and neck. Hives were noted on the face only once.    They report occasional episodes of globus sensation, described as a lump with a scratchy feeling without significant tightening, which improves with oral antihistamines.  The urticaria typically resolves within hours after taking cetirizine. There are no associated symptoms such as bruising, other discoloration, recurrent fevers, unexpected weight loss, emesis, or diarrhea. Hives can occur early in the morning, even before eating.    The patient currently takes cetirizine 10 mg by mouth once daily, but symptom control is inconsistent. Attempts to increase cetirizine to 10 mg twice daily resulted in bothersome somnolence, so they prefer to limit intake to once daily.  History is notable for Graves' disease. They have not noticed any worsening of hives with NSAID use.  The patient showed me several pictures of skin lesions on their smartphone, which are consistent with urticaria.      Patient Active Problem List   Diagnosis     Hypothyroidism     Generalized anxiety disorder     Atrophic vaginitis     History of colonic polyps     Irritable bowel syndrome with diarrhea     Hives     Urticaria     Right knee pain, unspecified chronicity       Past Medical History:   Diagnosis Date      Allergic rhinitis due to other allergen      Benign essential hypertension 6/10/2020     Esophageal reflux      GENERALIZED ANXIETY DIS 6/27/2007     Panic disorder without agoraphobia 1990     Trochanteric bursitis of right hip 8/2/2019     Unspecified hypothyroidism     Graves - s/p ablation      Problem (# of Occurrences) Relation (Name,Age of Onset)    Hypertension (1) Father (Garo Queevdo)    Thyroid Disease (2) Father (Garo Quevedo): Hashimotos, Son (Kalpesh Cruz)    Hyperlipidemia (1) Mother (Aniya Epstein)    Sjogren's (2) Paternal Grandmother, Paternal Aunt    Neuropathy (1) Mother (Aniya Epstein)          Past Surgical History:   Procedure Laterality Date     BIOPSY  1999    Breast biopsy     COLONOSCOPY  7/26/2011    Procedure:COMBINED COLONOSCOPY, REMOVE TUMOR/POLYP/LESION BY SNARE; single polyp removal; Surgeon:YUE LIMON; Location:[facility]     COLONOSCOPY Left 6/24/2016    Procedure: COMBINED COLONOSCOPY, SINGLE OR MULTIPLE BIOPSY/POLYPECTOMY BY BIOPSY;  Surgeon: Joseph Keyes MD;  Location: [facility]     COLONOSCOPY WITH CO2 INSUFFLATION N/A 6/24/2016    Procedure: COLONOSCOPY WITH CO2 INSUFFLATION;  Surgeon: Joseph Keyes MD;  Location: [facility]     ESOPHAGOSCOPY, GASTROSCOPY, DUODENOSCOPY (EGD), COMBINED N/A 5/8/2017    Procedure: COMBINED ESOPHAGOSCOPY, GASTROSCOPY, DUODENOSCOPY (EGD), BIOPSY SINGLE OR MULTIPLE;  ESOPHAGOSCOPY, GASTROSCOPY, DUODENOSCOPY (EGD) wiith multiple biopsies;  Surgeon: Chavez Land MD;  Location:  GI     EYE SURGERY  6/2/20    Cataract Surgery both eyes     GYN SURGERY  2005    Ablation of uterus.     HC HYSTEROSCOPY DIAGOSTIC (SEPARATE PROC)  8/10/2004    Hysteroscopy, D&C, Endometrial ablation     PELVIS LAPAROSCOPY,DX  1996    Pelvic pain - adhesions and mild endometriosis     US BREAST CLIP PLACEMENT W BIOPSY LEFT       Social History     Socioeconomic History     Marital status:      Spouse name: Barrera     Number of children:  2     Years of education: 15     Highest education level: None   Occupational History     Occupation:      Employer: [id number]   Tobacco Use     Smoking status: Never     Smokeless tobacco: Never     Tobacco comments:     no smokers in household   Vaping Use     Vaping status: Never Used   Substance and Sexual Activity     Alcohol use: No     Drug use: No     Sexual activity: Not Currently     Partners: Male     Birth control/protection: None     Comment: vasectomy   Other Topics Concern      Service No     Blood Transfusions No     Caffeine Concern No     Comment:  no pop, 1 cup coffee every am     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     Hobby Hazards No     Sleep Concern No     Stress Concern No     Weight Concern No     Special Diet No     Back Care Yes     Exercise Yes     Comment: 3-4 times a week     Bike Helmet Yes     Comment: biking      Seat Belt Yes     Self-Exams Yes     Comment: knows BSE, periodically, mammogram done 9/2/09     Parent/sibling w/ CABG, MI or angioplasty before 65F 55M? No   Social History Narrative    April 29, 2025        ENVIRONMENTAL HISTORY: [place] The [place] is heated with a forced air. They does have central air conditioning. The patient's bedroom is furnished with carpeting in bedroom and fabric window coverings.  Pets inside the house include 1 cat(s). There is no history of cockroach or mice infestation. There is/are 0 smokers in the house.  The house does have a damp basement.      Social Drivers of Health     Financial Resource Strain: Low Risk  (1/26/2025)    Financial Resource Strain      Within the past 12 months, have you or your family members you live with been unable to get utilities (heat, electricity) when it was really needed?: No   Food Insecurity: Low Risk  (1/26/2025)    Food Insecurity      Within the past 12 months, did you worry that your food would run out  before you got money to buy more?: No      Within the past 12 months, did the food you bought just not last and you didn t have money to get more?: No   Transportation Needs: Low Risk  (1/26/2025)    Transportation Needs      Within the past 12 months, has lack of transportation kept you from medical appointments, getting your medicines, non-medical meetings or appointments, work, or from getting things that you need?: No   Physical Activity: Insufficiently Active (1/26/2025)    Exercise Vital Sign      Days of Exercise per Week: 3 days      Minutes of Exercise per Session: 30 min   Stress: No Stress Concern Present (1/26/2025)    [employer] - Occupational Stress Questionnaire      Feeling of Stress : Not at all   Social Connections: Unknown (1/26/2025)    Social Connection and Isolation Panel [NHANES]      Frequency of Social Gatherings with Friends and Family: Three times a week   Interpersonal Safety: Low Risk  (1/28/2025)    Interpersonal Safety      Do you feel physically and emotionally safe where you currently live?: Yes      Within the past 12 months, have you been hit, slapped, kicked or otherwise physically hurt by someone?: No      Within the past 12 months, have you been humiliated or emotionally abused in other ways by your partner or ex-partner?: No   Housing Stability: Low Risk  (1/26/2025)    Housing Stability      Do you have housing? : Yes      Are you worried about losing your housing?: No               Current Outpatient Medications:      cetirizine (ZYRTEC) 10 MG tablet, Take 1 tablet (10 mg) by mouth 2 times daily., Disp: 60 tablet, Rfl: 1     DULoxetine (CYMBALTA) 30 MG capsule, Take 1 capsule (30 mg) by mouth daily., Disp: 90 capsule, Rfl: 3     EPINEPHrine (ANY BX GENERIC EQUIV) 0.3 MG/0.3ML injection 2-pack, Inject 0.3 mLs (0.3 mg) into the muscle as needed for anaphylaxis. May repeat one time in 5-15 minutes if response to initial dose is inadequate., Disp: 2  each, Rfl: 2     fish oil-omega-3 fatty acids 1000 MG capsule, Take 2 g by mouth daily., Disp: , Rfl:      levothyroxine (SYNTHROID/LEVOTHROID) 112 MCG tablet, Take 1 tablet (112 mcg) by mouth daily., Disp: 90 tablet, Rfl: 3     vitamin B complex with vitamin C (VITAMIN  B COMPLEX) tablet, Take 1 tablet by mouth daily., Disp: , Rfl:      famotidine (PEPCID) 20 MG tablet, Take 1 tablet (20 mg) by mouth 2 times daily. (Patient not taking: Reported on 4/29/2025), Disp: 30 tablet, Rfl: 1  Immunization History   Administered Date(s) Administered     COVID-19 Monovalent 18+ (Moderna) 02/04/2021, 04/21/2021, 01/03/2022     HEPA 07/07/1999, 02/21/2000     HepB 07/07/1999, 08/06/1999, 02/11/2000     Influenza (IIV3) PF 10/20/1997, 10/16/1998, 11/09/1999, 12/02/2002, 10/05/2010, 10/04/2011, 10/02/2012, 10/01/2013, 10/08/2014, 11/01/2015     Influenza (prior to 2024) 10/27/2009     Influenza Vaccine 18-64 (Flublok) 12/19/2019     Influenza Vaccine >6 months,quad, PF 09/30/2014, 10/10/2016, 09/27/2017, 10/11/2022, 10/26/2023     Influenza Vaccine, 6+MO IM (QUADRIVALENT W/PRESERVATIVES) 09/29/2015, 09/27/2017, 09/25/2018, 10/14/2021     Influenza,INJ,MDCK,PF,Quad >6mo(Flucelvax) 10/16/2020     TD,PF 7+ (Tenivac) 04/17/1996, 01/17/2017     TDAP (Adacel,Boostrix) 08/08/2006     Typhoid IM 08/01/1999     Allergies   Allergen Reactions     Amoxicillin      tightness in throat     Cephalosporins      ceftin     Clindamycin Base      Nausea, lightheadeness     Erythromycin      Welts     Macrolides And Ketolides      Sulfa Antibiotics Swelling     bactrim     Sulfamethoxazole      Trimethoprim      OBJECTIVE:                                                                 BP (!) 151/96   Pulse 72   Wt 85.8 kg (189 lb 2.5 oz)   LMP 07/18/2005   SpO2 100%   BMI 27.93 kg/m              Physical Exam  Vitals and nursing note reviewed.   Constitutional:       General: Laura is not in acute distress.     Appearance: Laura is not  ill-appearing, toxic-appearing or diaphoretic.   HENT:      Head: Normocephalic and atraumatic.      Right Ear: Tympanic membrane, ear canal and external ear normal.      Left Ear: Tympanic membrane, ear canal and external ear normal.      Nose: No mucosal edema, congestion or rhinorrhea.      Right Turbinates: Not enlarged, swollen or pale.      Left Turbinates: Not enlarged, swollen or pale.      Mouth/Throat:      Lips: Pink.      Mouth: Mucous membranes are moist.      Pharynx: Oropharynx is clear. No pharyngeal swelling, oropharyngeal exudate, posterior oropharyngeal erythema or uvula swelling.   Eyes:      General:         Right eye: No discharge.         Left eye: No discharge.      Conjunctiva/sclera: Conjunctivae normal.   Pulmonary:      Effort: Pulmonary effort is normal. No respiratory distress.      Breath sounds: Normal breath sounds and air entry. No stridor, decreased air movement or transmitted upper airway sounds. No decreased breath sounds, wheezing, rhonchi or rales.   Skin:     General: Skin is warm.      Comments: No visible urticaria or angioedema   Neurological:      Mental Status: Laura is alert and oriented to person, place, and time.   Psychiatric:         Mood and Affect: Mood normal.         Behavior: Behavior normal.            ASSESSMENT/PLAN:         Urticaria  Clinical presentation seems to be consistent with chronic spontaneous urticaria. Chronic urticaria is not due to a specific allergen.  The average duration of the disease is 2 to 5 years.  Reviewed exacerbating and concerning signs of chronic urticaria.    -Ordered CBC with differential, comprehensive metabolic panel, thyroid studies, including thyroid antibody levels, urticaria induced basophil activation, and IgE to alpha gal.  Globus sensation is unusual for chronic urticaria.  Considering that, I ordered baseline serum tryptase level, and epinephrine autoinjector.  Anaphylaxis action plan was reviewed and provided.    -Start  cetirizine 10 mg by mouth in the morning and fexofenadine 180 mg by mouth at bedtime.  -Start famotidine 20 mg by mouth twice daily.  Depending on symptom control, we may need to discuss adding hydroxyzine as needed and/or montelukast daily.  If the patient is still symptomatic, will likely need omalizumab.    - Urticaria Induced Basophil Activation  - CBC with Platelets & Differential  - Comprehensive metabolic panel  - Anti thyroglobulin antibody  - Thyroid peroxidase antibody  - Galactose alpha 1 3 Galactose IgE  - EPINEPHrine (ANY BX GENERIC EQUIV) 0.3 MG/0.3ML injection 2-pack  Dispense: 2 each; Refill: 2  - Tryptase      Follow-up in 2 months or sooner if needed.    Thank you for allowing us to participate in the care of this patient. Please feel free to contact us if there are any questions or concerns about the patient.    Disclaimer: This note consists of symbols derived from keyboarding, dictation and/or voice recognition software. As a result, there may be errors in the script that have gone undetected. Please consider this when interpreting information found in this chart.    Consent was obtained from the patient to use an AI documentation tool in the creation of this note.    Paramjit Anderson MD, FAAAAI, FACAAI  Allergy and Asthma     [facility]        Again, thank you for allowing me to participate in the care of your patient.        Sincerely,        Paramjit Anderson MD    Electronically signed

## 2025-04-29 NOTE — NURSING NOTE
RN reviewed Anaphylaxis Action Plan with patient. Educated on the symptoms and treatment of anaphylaxis. Went through the different ways that a reaction can present, and the body systems that it can affect. Patient verbalized understanding.     Writer demonstrated how to use an EpiPen auto-injector.  Patient instructed to form a fist around the auto-injector, remove blue safety release by pulling straight up, then firmly push orange tip against outer thigh so it clicks, holding for 3 seconds.  Patient advised that once used, needle will not be exposed, as orange tip extends.  Patient advised to call 911 or go to emergency department after epi-pen use for further monitoring.       Rebekah Gautam MSN, RN   [facility], 4/29/2025 11:41 AM

## 2025-04-30 LAB
THYROGLOB AB SERPL IA-ACNC: <20 IU/ML
THYROPEROXIDASE AB SERPL-ACNC: <10 IU/ML

## 2025-05-01 LAB
ALPHA-GAL IGE QN: <0.1 KU/L
DEPRECATED MISC ALLERGEN IGE RAST QL: NORMAL

## 2025-05-13 ENCOUNTER — TRANSFERRED RECORDS (OUTPATIENT)
Dept: HEALTH INFORMATION MANAGEMENT | Facility: CLINIC | Age: 65
End: 2025-05-13
Payer: COMMERCIAL

## 2025-05-13 LAB
AST SERPL-CCNC: 22 U/L (ref 10–35)
CREATININE (EXTERNAL): 0.91 MG/DL (ref 0.5–1.05)

## 2025-05-28 ENCOUNTER — OFFICE VISIT (OUTPATIENT)
Dept: ALLERGY | Facility: OTHER | Age: 65
End: 2025-05-28
Payer: COMMERCIAL

## 2025-05-28 VITALS
BODY MASS INDEX: 27.58 KG/M2 | OXYGEN SATURATION: 98 % | DIASTOLIC BLOOD PRESSURE: 80 MMHG | HEART RATE: 86 BPM | WEIGHT: 186.73 LBS | SYSTOLIC BLOOD PRESSURE: 131 MMHG

## 2025-05-28 DIAGNOSIS — L50.1 CHRONIC IDIOPATHIC URTICARIA: Primary | ICD-10-CM

## 2025-05-28 PROCEDURE — 99213 OFFICE O/P EST LOW 20 MIN: CPT | Performed by: ALLERGY & IMMUNOLOGY

## 2025-05-28 PROCEDURE — 3079F DIAST BP 80-89 MM HG: CPT | Performed by: ALLERGY & IMMUNOLOGY

## 2025-05-28 PROCEDURE — 3075F SYST BP GE 130 - 139MM HG: CPT | Performed by: ALLERGY & IMMUNOLOGY

## 2025-05-28 RX ORDER — HYDROXYZINE HYDROCHLORIDE 25 MG/1
25 TABLET, FILM COATED ORAL EVERY 8 HOURS PRN
Qty: 90 TABLET | Refills: 1 | Status: SHIPPED | OUTPATIENT
Start: 2025-05-28

## 2025-05-28 NOTE — PATIENT INSTRUCTIONS
Continue famotidine 20 mg by mouth twice daily.    Fexofenadine 360 (2 tabs) in the morning.   Cetirizine 20 mg by mouth at bedtime.     Take hydroxyzine 25 mg by mouth every 8 hours as needed for hives breakthrough      Dr Anderson [phone]    All visits for food challenges, venom allergy testing, and medication/drug challenges MUST be scheduled through the allergy clinic nurse. Please send a Zumbl message or call the allergy scheduling line and ask to speak with Dr Anderson's team for scheduling these appointments. Appointments for these visits that are made through the schedulers or via Zumbl may be cancelled or rescheduled.    Allergy Shot Scheduling ([place]): [phone]    Pulmonary Function [phone]    Prescription Assistance  If you need assistance with your prescriptions (cost, coverage, etc) please contact: [facility] [phone]

## 2025-05-28 NOTE — LETTER
5/28/2025      Bel Cruz  [address] [facility] [id number]      Dear Colleague,    Thank you for referring your patient, Bel Cruz, to the [facility]. Please see a copy of my visit note below.    SUBJECTIVE:                                                                   Bel Cruz presents today to our Allergy Clinic at [facility] for a follow up visit. Laura is a 64 year old adult with chronic spontaneous urticaria.    History of urticaria that started in March 2025. In April 2025, CBC with differential within normal limits, comprehensive metabolic panel within normal limits, thyroid antibody levels within normal limits, serum tryptase level within normal limits, negative serum IgE for alpha gal.  Mildly elevated Urticaria induced basophil activation index.  The patient reports that their chronic urticaria had been well controlled with daily cetirizine. However, over the past week, they have noticed an increase in hives, primarily affecting the thighs and arms. Despite continuing their regimen of cetirizine 10 mg nightly, fexofenadine 180 mg each morning, and famotidine 20 mg twice daily, they have experienced daily hives for the past 4 to 5 days.    Bel also describes intermittent sensations of a lump in the throat on several occasions. These episodes have been brief and mild, and they have not felt the need to use their epinephrine autoinjector.    Patient Active Problem List   Diagnosis     Hypothyroidism     Generalized anxiety disorder     Atrophic vaginitis     History of colonic polyps     Irritable bowel syndrome with diarrhea     Hives     Urticaria     Right knee pain, unspecified chronicity       Past Medical History:   Diagnosis Date     Allergic rhinitis due to other allergen      Benign essential hypertension 6/10/2020     Esophageal reflux      GENERALIZED ANXIETY DIS 6/27/2007     Panic disorder without agoraphobia 1990      Trochanteric bursitis of right hip 8/2/2019     Unspecified hypothyroidism     Graves - s/p ablation      Problem (# of Occurrences) Relation (Name,Age of Onset)    Hypertension (1) Father (Garo Quevedo)    Thyroid Disease (2) Father (Garo Quevedo): Hashimotos, Son (Kalpesh Cruz)    Hyperlipidemia (1) Mother (Aniya Epstein)    Sjogren's (2) Paternal Grandmother, Paternal Aunt    Neuropathy (1) Mother (Aniya Epstein)          Past Surgical History:   Procedure Laterality Date     BIOPSY  1999    Breast biopsy     COLONOSCOPY  7/26/2011    Procedure:COMBINED COLONOSCOPY, REMOVE TUMOR/POLYP/LESION BY SNARE; single polyp removal; Surgeon:YUE LIMON; Location: GI     COLONOSCOPY Left 6/24/2016    Procedure: COMBINED COLONOSCOPY, SINGLE OR MULTIPLE BIOPSY/POLYPECTOMY BY BIOPSY;  Surgeon: Joseph Keyes MD;  Location: [facility] OR     COLONOSCOPY WITH CO2 INSUFFLATION N/A 6/24/2016    Procedure: COLONOSCOPY WITH CO2 INSUFFLATION;  Surgeon: Joseph Keyes MD;  Location: [facility] OR     ESOPHAGOSCOPY, GASTROSCOPY, DUODENOSCOPY (EGD), COMBINED N/A 5/8/2017    Procedure: COMBINED ESOPHAGOSCOPY, GASTROSCOPY, DUODENOSCOPY (EGD), BIOPSY SINGLE OR MULTIPLE;  ESOPHAGOSCOPY, GASTROSCOPY, DUODENOSCOPY (EGD) wiith multiple biopsies;  Surgeon: Chavez Land MD;  Location:  GI     EYE SURGERY  6/2/20    Cataract Surgery both eyes     GYN SURGERY  2005    Ablation of uterus.     HC HYSTEROSCOPY DIAGOSTIC (SEPARATE PROC)  8/10/2004    Hysteroscopy, D&C, Endometrial ablation     PELVIS LAPAROSCOPY,DX  1996    Pelvic pain - adhesions and mild endometriosis     US BREAST CLIP PLACEMENT W BIOPSY LEFT       Social History     Socioeconomic History     Marital status:      Spouse name: Barrera     Number of children: 2     Years of education: 15     Highest education level: None   Occupational History     Occupation:      Employer:    Tobacco Use     Smoking status: Never      Smokeless tobacco: Never     Tobacco comments:     no smokers in household   Vaping Use     Vaping status: Never Used   Substance and Sexual Activity     Alcohol use: No     Drug use: No     Sexual activity: Not Currently     Partners: Male     Birth control/protection: None     Comment: vasectomy   Other Topics Concern      Service No     Blood Transfusions No     Caffeine Concern No     Comment:  no pop, 1 cup coffee every am     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     Hobby Hazards No     Sleep Concern No     Stress Concern No     Weight Concern No     Special Diet No     Back Care Yes     Exercise Yes     Comment: 3-4 times a week     Bike Helmet Yes     Comment: biking      Seat Belt Yes     Self-Exams Yes     Comment: knows BSE, periodically, mammogram done 9/2/09     Parent/sibling w/ CABG, MI or angioplasty before 65F 55M? No   Social History Narrative    April 29, 2025        ENVIRONMENTAL HISTORY: [place] is heated with a forced air. They does have central air conditioning. The patient's bedroom is furnished with carpeting in bedroom and fabric window coverings.  Pets inside the house include 1 cat(s). There is no history of cockroach or mice infestation. There is/are 0 smokers in the house.  The house does have a damp basement.      Social Drivers of Health     Financial Resource Strain: Low Risk  (1/26/2025)    Financial Resource Strain      Within the past 12 months, have you or your family members you live with been unable to get utilities (heat, electricity) when it was really needed?: No   Food Insecurity: Low Risk  (1/26/2025)    Food Insecurity      Within the past 12 months, did you worry that your food would run out before you got money to buy more?: No      Within the past 12 months, did the food you bought just not last and you didn t have money to get more?: No   Transportation Needs: Low Risk   (1/26/2025)    Transportation Needs      Within the past 12 months, has lack of transportation kept you from medical appointments, getting your medicines, non-medical meetings or appointments, work, or from getting things that you need?: No   Physical Activity: Insufficiently Active (1/26/2025)    Exercise Vital Sign      Days of Exercise per Week: 3 days      Minutes of Exercise per Session: 30 min   Stress: No Stress Concern Present (1/26/2025)    [employer] - Occupational Stress Questionnaire      Feeling of Stress : Not at all   Social Connections: Unknown (1/26/2025)    Social Connection and Isolation Panel [NHANES]      Frequency of Social Gatherings with Friends and Family: Three times a week   Interpersonal Safety: Low Risk  (1/28/2025)    Interpersonal Safety      Do you feel physically and emotionally safe where you currently live?: Yes      Within the past 12 months, have you been hit, slapped, kicked or otherwise physically hurt by someone?: No      Within the past 12 months, have you been humiliated or emotionally abused in other ways by your partner or ex-partner?: No   Housing Stability: Low Risk  (1/26/2025)    Housing Stability      Do you have housing? : Yes      Are you worried about losing your housing?: No             Current Outpatient Medications:      cetirizine (ZYRTEC) 10 MG tablet, Take 1 tablet (10 mg) by mouth 2 times daily. (Patient taking differently: Take 10 mg by mouth 2 times daily. Once daily), Disp: 60 tablet, Rfl: 1     DULoxetine (CYMBALTA) 30 MG capsule, Take 1 capsule (30 mg) by mouth daily., Disp: 90 capsule, Rfl: 3     EPINEPHrine (ANY BX GENERIC EQUIV) 0.3 MG/0.3ML injection 2-pack, Inject 0.3 mLs (0.3 mg) into the muscle as needed for anaphylaxis. May repeat one time in 5-15 minutes if response to initial dose is inadequate., Disp: 2 each, Rfl: 2     famotidine (PEPCID) 20 MG tablet, Take 1 tablet (20 mg) by mouth 2 times daily., Disp: 30  tablet, Rfl: 1     fexofenadine (ALLEGRA) 180 MG tablet, Take 1 tablet (180 mg) by mouth at bedtime., Disp: 30 tablet, Rfl: 5     fish oil-omega-3 fatty acids 1000 MG capsule, Take 2 g by mouth daily., Disp: , Rfl:      hydrOXYzine HCl (ATARAX) 25 MG tablet, Take 1 tablet (25 mg) by mouth every 8 hours as needed for itching or other (hives)., Disp: 90 tablet, Rfl: 1     levothyroxine (SYNTHROID/LEVOTHROID) 112 MCG tablet, Take 1 tablet (112 mcg) by mouth daily., Disp: 90 tablet, Rfl: 3     vitamin B complex with vitamin C (VITAMIN  B COMPLEX) tablet, Take 1 tablet by mouth daily., Disp: , Rfl:   Immunization History   Administered Date(s) Administered     COVID-19 Monovalent 18+ (Moderna) 02/04/2021, 04/21/2021, 01/03/2022     HEPA 07/07/1999, 02/21/2000     HepB 07/07/1999, 08/06/1999, 02/11/2000     Influenza (IIV3) PF 10/20/1997, 10/16/1998, 11/09/1999, 12/02/2002, 10/05/2010, 10/04/2011, 10/02/2012, 10/01/2013, 10/08/2014, 11/01/2015     Influenza (prior to 2024) 10/27/2009     Influenza Vaccine 18-64 (Flublok) 12/19/2019     Influenza Vaccine >6 months,quad, PF 09/30/2014, 10/10/2016, 09/27/2017, 10/11/2022, 10/26/2023     Influenza Vaccine, 6+MO IM (QUADRIVALENT W/PRESERVATIVES) 09/29/2015, 09/27/2017, 09/25/2018, 10/14/2021     Influenza,INJ,MDCK,PF,Quad >6mo(Flucelvax) 10/16/2020     TD,PF 7+ (Tenivac) 04/17/1996, 01/17/2017     TDAP (Adacel,Boostrix) 08/08/2006     Typhoid IM 08/01/1999     Allergies   Allergen Reactions     Amoxicillin      tightness in throat     Cephalosporins      ceftin     Clindamycin Base      Nausea, lightheadeness     Erythromycin      Welts     Macrolides And Ketolides      Sulfa Antibiotics Swelling     bactrim     Sulfamethoxazole      Trimethoprim      OBJECTIVE:                                                                 /80   Pulse 86   Wt 84.7 kg (186 lb 11.7 oz)   LMP 07/18/2005   SpO2 98%   BMI 27.58 kg/m          Physical Exam  Vitals and nursing note  reviewed.   Constitutional:       General: Laura is not in acute distress.     Appearance: Laura is not ill-appearing, toxic-appearing or diaphoretic.   HENT:      Head: Normocephalic and atraumatic.      Right Ear: Tympanic membrane, ear canal and external ear normal.      Left Ear: Tympanic membrane, ear canal and external ear normal.      Nose: No mucosal edema, congestion or rhinorrhea.      Right Turbinates: Not enlarged, swollen or pale.      Left Turbinates: Not enlarged, swollen or pale.      Mouth/Throat:      Lips: Pink.      Mouth: Mucous membranes are moist.      Pharynx: Oropharynx is clear. No pharyngeal swelling, oropharyngeal exudate, posterior oropharyngeal erythema or uvula swelling.   Eyes:      General:         Right eye: No discharge.         Left eye: No discharge.      Conjunctiva/sclera: Conjunctivae normal.   Pulmonary:      Effort: Pulmonary effort is normal. No respiratory distress.      Breath sounds: Normal breath sounds and air entry. No stridor, decreased air movement or transmitted upper airway sounds. No decreased breath sounds, wheezing, rhonchi or rales.   Skin:     General: Skin is warm.      Comments: Multiple urticarial lesions, ranging from dime- to quarter-sized, noted on the posterior thighs bilaterally. On the right forearm, and right side of the upper back, there are several dime-sized, flat, fading urticarial lesions.   Neurological:      Mental Status: Laura is alert and oriented to person, place, and time.   Psychiatric:         Mood and Affect: Mood normal.         Behavior: Behavior normal.         ASSESSMENT/PLAN:         Chronic idiopathic urticaria    The patient is experiencing a recent flare of chronic urticaria, which is not well-controlled on the current regimen.    - Continue famotidine 20 mg by mouth twice daily.  - Increase fexofenadine to 360 mg by mouth in the morning.  - Increase cetirizine to 20 mg by mouth at night.  - Add hydroxyzine 25 mg by mouth every 8  hours as needed for breakthrough hives.  - Depending on symptom control, anticipate initiating omalizumab. This was briefly discussed with the patient during today s visit.    - hydrOXYzine HCl (ATARAX) 25 MG tablet  Dispense: 90 tablet; Refill: 1  - Adult Allergy Clinic Follow-Up Order     Follow-up in 4 weeks or sooner if needed.    Thank you for allowing us to participate in the care of this patient. Please feel free to contact us if there are any questions or concerns about the patient.    Disclaimer: This note consists of symbols derived from keyboarding, dictation and/or voice recognition software. As a result, there may be errors in the script that have gone undetected. Please consider this when interpreting information found in this chart.    Consent was obtained from the patient to use an AI documentation tool in the creation of this note.     Paramjit Anderson MD, FAAAAI, FACAAI  Allergy, Asthma and Immunology     [facility]      Again, thank you for allowing me to participate in the care of your patient.        Sincerely,        Paramjit Anderson MD    Electronically signed

## 2025-05-28 NOTE — PROGRESS NOTES
SUBJECTIVE:                                                                   Bel Cruz presents today to our Allergy Clinic at [facility] for a follow up visit. Laura is a 64 year old adult with chronic spontaneous urticaria.    History of urticaria that started in March 2025. In April 2025, CBC with differential within normal limits, comprehensive metabolic panel within normal limits, thyroid antibody levels within normal limits, serum tryptase level within normal limits, negative serum IgE for alpha gal.  Mildly elevated Urticaria induced basophil activation index.  The patient reports that their chronic urticaria had been well controlled with daily cetirizine. However, over the past week, they have noticed an increase in hives, primarily affecting the thighs and arms. Despite continuing their regimen of cetirizine 10 mg nightly, fexofenadine 180 mg each morning, and famotidine 20 mg twice daily, they have experienced daily hives for the past 4 to 5 days.    Bel also describes intermittent sensations of a lump in the throat on several occasions. These episodes have been brief and mild, and they have not felt the need to use their epinephrine autoinjector.    Patient Active Problem List   Diagnosis    Hypothyroidism    Generalized anxiety disorder    Atrophic vaginitis    History of colonic polyps    Irritable bowel syndrome with diarrhea    Hives    Urticaria    Right knee pain, unspecified chronicity       Past Medical History:   Diagnosis Date    Allergic rhinitis due to other allergen     Benign essential hypertension 6/10/2020    Esophageal reflux     GENERALIZED ANXIETY DIS 6/27/2007    Panic disorder without agoraphobia 1990    Trochanteric bursitis of right hip 8/2/2019    Unspecified hypothyroidism     Graves - s/p ablation      Problem (# of Occurrences) Relation (Name,Age of Onset)    Hypertension (1) Father (Garo Quevedo)    Thyroid Disease (2) Father (Garo Quevedo):  [place]os, Son (Kalpesh Cruz)    Hyperlipidemia (1) Mother (Aniya Epstein)    Sjogren's (2) Paternal Grandmother, Paternal Aunt    Neuropathy (1) Mother (Aniya Epstein)          Past Surgical History:   Procedure Laterality Date    BIOPSY  1999    Breast biopsy    COLONOSCOPY  7/26/2011    Procedure:COMBINED COLONOSCOPY, REMOVE TUMOR/POLYP/LESION BY SNARE; single polyp removal; Surgeon:YUE LIMON; Location:[facility]    COLONOSCOPY Left 6/24/2016    Procedure: COMBINED COLONOSCOPY, SINGLE OR MULTIPLE BIOPSY/POLYPECTOMY BY BIOPSY;  Surgeon: Joseph Keyes MD;  Location: [facility]    COLONOSCOPY WITH CO2 INSUFFLATION N/A 6/24/2016    Procedure: COLONOSCOPY WITH CO2 INSUFFLATION;  Surgeon: Joseph Keyes MD;  Location: [facility]    ESOPHAGOSCOPY, GASTROSCOPY, DUODENOSCOPY (EGD), COMBINED N/A 5/8/2017    Procedure: COMBINED ESOPHAGOSCOPY, GASTROSCOPY, DUODENOSCOPY (EGD), BIOPSY SINGLE OR MULTIPLE;  ESOPHAGOSCOPY, GASTROSCOPY, DUODENOSCOPY (EGD) wiith multiple biopsies;  Surgeon: Chavez Land MD;  Location:  GI    EYE SURGERY  6/2/20    Cataract Surgery both eyes    GYN SURGERY  2005    Ablation of uterus.    HC HYSTEROSCOPY DIAGOSTIC (SEPARATE PROC)  8/10/2004    Hysteroscopy, D&C, Endometrial ablation    PELVIS LAPAROSCOPY,DX  1996    Pelvic pain - adhesions and mild endometriosis    US BREAST CLIP PLACEMENT W BIOPSY LEFT       Social History     Socioeconomic History    Marital status:      Spouse name: Barrera    Number of children: 2    Years of education: 15    Highest education level: None   Occupational History    Occupation:      Employer:    Tobacco Use    Smoking status: Never    Smokeless tobacco: Never    Tobacco comments:     no smokers in household   Vaping Use    Vaping status: Never Used   Substance and Sexual Activity    Alcohol use: No    Drug use: No    Sexual activity: Not Currently     Partners: Male     Birth control/protection: None      Comment: vasectomy   Other Topics Concern     Service No    Blood Transfusions No    Caffeine Concern No     Comment:  no pop, 1 cup coffee every am    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    Hobby Hazards No    Sleep Concern No    Stress Concern No    Weight Concern No    Special Diet No    Back Care Yes    Exercise Yes     Comment: 3-4 times a week    Bike Helmet Yes     Comment: biking     Seat Belt Yes    Self-Exams Yes     Comment: knows BSE, periodically, mammogram done 9/2/09    Parent/sibling w/ CABG, MI or angioplasty before 65F 55M? No   Social History Narrative    April 29, 2025        ENVIRONMENTAL HISTORY: [place] is heated with a forced air. They does have central air conditioning. The patient's bedroom is furnished with carpeting in bedroom and fabric window coverings.  Pets inside the house include 1 cat(s). There is no history of cockroach or mice infestation. There is/are 0 smokers in the house.  The house does have a damp basement.      Social Drivers of Health     Financial Resource Strain: Low Risk  (1/26/2025)    Financial Resource Strain     Within the past 12 months, have you or your family members you live with been unable to get utilities (heat, electricity) when it was really needed?: No   Food Insecurity: Low Risk  (1/26/2025)    Food Insecurity     Within the past 12 months, did you worry that your food would run out before you got money to buy more?: No     Within the past 12 months, did the food you bought just not last and you didn t have money to get more?: No   Transportation Needs: Low Risk  (1/26/2025)    Transportation Needs     Within the past 12 months, has lack of transportation kept you from medical appointments, getting your medicines, non-medical meetings or appointments, work, or from getting things that you need?: No   Physical Activity: Insufficiently Active (1/26/2025)     Exercise Vital Sign     Days of Exercise per Week: 3 days     Minutes of Exercise per Session: 30 min   Stress: No Stress Concern Present (1/26/2025)    [employer] [place] [employer] - Occupational Stress Questionnaire     Feeling of Stress : Not at all   Social Connections: Unknown (1/26/2025)    Social Connection and Isolation Panel [NHANES]     Frequency of Social Gatherings with Friends and Family: Three times a week   Interpersonal Safety: Low Risk  (1/28/2025)    Interpersonal Safety     Do you feel physically and emotionally safe where you currently live?: Yes     Within the past 12 months, have you been hit, slapped, kicked or otherwise physically hurt by someone?: No     Within the past 12 months, have you been humiliated or emotionally abused in other ways by your partner or ex-partner?: No   Housing Stability: Low Risk  (1/26/2025)    Housing Stability     Do you have housing? : Yes     Are you worried about losing your housing?: No             Current Outpatient Medications:     cetirizine (ZYRTEC) 10 MG tablet, Take 1 tablet (10 mg) by mouth 2 times daily. (Patient taking differently: Take 10 mg by mouth 2 times daily. Once daily), Disp: 60 tablet, Rfl: 1    DULoxetine (CYMBALTA) 30 MG capsule, Take 1 capsule (30 mg) by mouth daily., Disp: 90 capsule, Rfl: 3    EPINEPHrine (ANY BX GENERIC EQUIV) 0.3 MG/0.3ML injection 2-pack, Inject 0.3 mLs (0.3 mg) into the muscle as needed for anaphylaxis. May repeat one time in 5-15 minutes if response to initial dose is inadequate., Disp: 2 each, Rfl: 2    famotidine (PEPCID) 20 MG tablet, Take 1 tablet (20 mg) by mouth 2 times daily., Disp: 30 tablet, Rfl: 1    fexofenadine (ALLEGRA) 180 MG tablet, Take 1 tablet (180 mg) by mouth at bedtime., Disp: 30 tablet, Rfl: 5    fish oil-omega-3 fatty acids 1000 MG capsule, Take 2 g by mouth daily., Disp: , Rfl:     hydrOXYzine HCl (ATARAX) 25 MG tablet, Take 1 tablet (25 mg) by mouth every 8 hours as needed for  itching or other (hives)., Disp: 90 tablet, Rfl: 1    levothyroxine (SYNTHROID/LEVOTHROID) 112 MCG tablet, Take 1 tablet (112 mcg) by mouth daily., Disp: 90 tablet, Rfl: 3    vitamin B complex with vitamin C (VITAMIN  B COMPLEX) tablet, Take 1 tablet by mouth daily., Disp: , Rfl:   Immunization History   Administered Date(s) Administered    COVID-19 Monovalent 18+ (Moderna) 02/04/2021, 04/21/2021, 01/03/2022    HEPA 07/07/1999, 02/21/2000    HepB 07/07/1999, 08/06/1999, 02/11/2000    Influenza (IIV3) PF 10/20/1997, 10/16/1998, 11/09/1999, 12/02/2002, 10/05/2010, 10/04/2011, 10/02/2012, 10/01/2013, 10/08/2014, 11/01/2015    Influenza (prior to 2024) 10/27/2009    Influenza Vaccine 18-64 (Flublok) 12/19/2019    Influenza Vaccine >6 months,quad, PF 09/30/2014, 10/10/2016, 09/27/2017, 10/11/2022, 10/26/2023    Influenza Vaccine, 6+MO IM (QUADRIVALENT W/PRESERVATIVES) 09/29/2015, 09/27/2017, 09/25/2018, 10/14/2021    Influenza,INJ,MDCK,PF,Quad >6mo(Flucelvax) 10/16/2020    TD,PF 7+ (Tenivac) 04/17/1996, 01/17/2017    TDAP (Adacel,Boostrix) 08/08/2006    Typhoid IM 08/01/1999     Allergies   Allergen Reactions    Amoxicillin      tightness in throat    Cephalosporins      ceftin    Clindamycin Base      Nausea, lightheadeness    Erythromycin      Welts    Macrolides And Ketolides     Sulfa Antibiotics Swelling     bactrim    Sulfamethoxazole     Trimethoprim      OBJECTIVE:                                                                 /80   Pulse 86   Wt 84.7 kg (186 lb 11.7 oz)   LMP 07/18/2005   SpO2 98%   BMI 27.58 kg/m          Physical Exam  Vitals and nursing note reviewed.   Constitutional:       General: Laura is not in acute distress.     Appearance: Laura is not ill-appearing, toxic-appearing or diaphoretic.   HENT:      Head: Normocephalic and atraumatic.      Right Ear: Tympanic membrane, ear canal and external ear normal.      Left Ear: Tympanic membrane, ear canal and external ear normal.      Nose:  No mucosal edema, congestion or rhinorrhea.      Right Turbinates: Not enlarged, swollen or pale.      Left Turbinates: Not enlarged, swollen or pale.      Mouth/Throat:      Lips: Pink.      Mouth: Mucous membranes are moist.      Pharynx: Oropharynx is clear. No pharyngeal swelling, oropharyngeal exudate, posterior oropharyngeal erythema or uvula swelling.   Eyes:      General:         Right eye: No discharge.         Left eye: No discharge.      Conjunctiva/sclera: Conjunctivae normal.   Pulmonary:      Effort: Pulmonary effort is normal. No respiratory distress.      Breath sounds: Normal breath sounds and air entry. No stridor, decreased air movement or transmitted upper airway sounds. No decreased breath sounds, wheezing, rhonchi or rales.   Skin:     General: Skin is warm.      Comments: Multiple urticarial lesions, ranging from dime- to quarter-sized, noted on the posterior thighs bilaterally. On the right forearm, and right side of the upper back, there are several dime-sized, flat, fading urticarial lesions.   Neurological:      Mental Status: Laura is alert and oriented to person, place, and time.   Psychiatric:         Mood and Affect: Mood normal.         Behavior: Behavior normal.         ASSESSMENT/PLAN:         Chronic idiopathic urticaria    The patient is experiencing a recent flare of chronic urticaria, which is not well-controlled on the current regimen.    - Continue famotidine 20 mg by mouth twice daily.  - Increase fexofenadine to 360 mg by mouth in the morning.  - Increase cetirizine to 20 mg by mouth at night.  - Add hydroxyzine 25 mg by mouth every 8 hours as needed for breakthrough hives.  - Depending on symptom control, anticipate initiating omalizumab. This was briefly discussed with the patient during today s visit.    - hydrOXYzine HCl (ATARAX) 25 MG tablet  Dispense: 90 tablet; Refill: 1  - Adult Allergy Clinic Follow-Up Order     Follow-up in 4 weeks or sooner if needed.    Thank you  for allowing us to participate in the care of this patient. Please feel free to contact us if there are any questions or concerns about the patient.    Disclaimer: This note consists of symbols derived from keyboarding, dictation and/or voice recognition software. As a result, there may be errors in the script that have gone undetected. Please consider this when interpreting information found in this chart.    Consent was obtained from the patient to use an AI documentation tool in the creation of this note.     Paramjit Anderson MD, FAAAAI, FACAAI  Allergy, Asthma and Immunology     [facility]

## 2025-06-01 ENCOUNTER — APPOINTMENT (OUTPATIENT)
Dept: GENERAL RADIOLOGY | Facility: CLINIC | Age: 65
End: 2025-06-01
Attending: STUDENT IN AN ORGANIZED HEALTH CARE EDUCATION/TRAINING PROGRAM
Payer: COMMERCIAL

## 2025-06-01 ENCOUNTER — APPOINTMENT (OUTPATIENT)
Dept: CT IMAGING | Facility: CLINIC | Age: 65
End: 2025-06-01
Attending: STUDENT IN AN ORGANIZED HEALTH CARE EDUCATION/TRAINING PROGRAM
Payer: COMMERCIAL

## 2025-06-01 ENCOUNTER — HOSPITAL ENCOUNTER (EMERGENCY)
Facility: CLINIC | Age: 65
Discharge: HOME | End: 2025-06-01
Attending: STUDENT IN AN ORGANIZED HEALTH CARE EDUCATION/TRAINING PROGRAM | Admitting: STUDENT IN AN ORGANIZED HEALTH CARE EDUCATION/TRAINING PROGRAM
Payer: COMMERCIAL

## 2025-06-01 VITALS
TEMPERATURE: 98 F | DIASTOLIC BLOOD PRESSURE: 94 MMHG | HEART RATE: 70 BPM | OXYGEN SATURATION: 94 % | RESPIRATION RATE: 20 BRPM | SYSTOLIC BLOOD PRESSURE: 140 MMHG

## 2025-06-01 DIAGNOSIS — R07.9 CHEST PAIN, UNSPECIFIED TYPE: ICD-10-CM

## 2025-06-01 DIAGNOSIS — R94.31 NONSPECIFIC ABNORMAL ELECTROCARDIOGRAM (ECG) (EKG): ICD-10-CM

## 2025-06-01 LAB
ALBUMIN SERPL BCG-MCNC: 4 G/DL (ref 3.5–5.2)
ALP SERPL-CCNC: 97 U/L (ref 40–150)
ALT SERPL W P-5'-P-CCNC: 18 U/L (ref 0–70)
ANION GAP SERPL CALCULATED.3IONS-SCNC: 13 MMOL/L (ref 7–15)
AST SERPL W P-5'-P-CCNC: 26 U/L (ref 0–45)
ATRIAL RATE - MUSE: 70 BPM
BASOPHILS # BLD AUTO: 0 10E3/UL (ref 0–0.2)
BASOPHILS NFR BLD AUTO: 0 %
BILIRUB SERPL-MCNC: 0.3 MG/DL
BUN SERPL-MCNC: 15.9 MG/DL (ref 8–23)
CALCIUM SERPL-MCNC: 9.9 MG/DL (ref 8.8–10.4)
CHLORIDE SERPL-SCNC: 99 MMOL/L (ref 98–107)
CREAT SERPL-MCNC: 0.9 MG/DL (ref 0.51–1.17)
D DIMER PPP FEU-MCNC: 2.1 UG/ML FEU (ref 0–0.5)
DIASTOLIC BLOOD PRESSURE - MUSE: NORMAL MMHG
EGFRCR SERPLBLD CKD-EPI 2021: 71 ML/MIN/1.73M2
EOSINOPHIL # BLD AUTO: 0 10E3/UL (ref 0–0.7)
EOSINOPHIL NFR BLD AUTO: 0 %
ERYTHROCYTE [DISTWIDTH] IN BLOOD BY AUTOMATED COUNT: 12.4 % (ref 10–15)
GLUCOSE SERPL-MCNC: 107 MG/DL (ref 70–99)
HCO3 SERPL-SCNC: 24 MMOL/L (ref 22–29)
HCT VFR BLD AUTO: 41.7 % (ref 35–53)
HGB BLD-MCNC: 14.3 G/DL (ref 11.7–17.7)
HOLD SPECIMEN: NORMAL
HOLD SPECIMEN: NORMAL
IMM GRANULOCYTES # BLD: 0 10E3/UL
IMM GRANULOCYTES NFR BLD: 0 %
INTERPRETATION ECG - MUSE: NORMAL
LYMPHOCYTES # BLD AUTO: 1.2 10E3/UL (ref 0.8–5.3)
LYMPHOCYTES NFR BLD AUTO: 27 %
MCH RBC QN AUTO: 29.2 PG (ref 26.5–33)
MCHC RBC AUTO-ENTMCNC: 34.3 G/DL (ref 31.5–36.5)
MCV RBC AUTO: 85 FL (ref 78–100)
MONOCYTES # BLD AUTO: 0.4 10E3/UL (ref 0–1.3)
MONOCYTES NFR BLD AUTO: 9 %
NEUTROPHILS # BLD AUTO: 2.9 10E3/UL (ref 1.6–8.3)
NEUTROPHILS NFR BLD AUTO: 64 %
NRBC # BLD AUTO: 0 10E3/UL
NRBC BLD AUTO-RTO: 0 /100
P AXIS - MUSE: 45 DEGREES
PLATELET # BLD AUTO: 183 10E3/UL (ref 150–450)
POTASSIUM SERPL-SCNC: 4.1 MMOL/L (ref 3.4–5.3)
PR INTERVAL - MUSE: 140 MS
PROT SERPL-MCNC: 7.5 G/DL (ref 6.4–8.3)
QRS DURATION - MUSE: 86 MS
QT - MUSE: 416 MS
QTC - MUSE: 449 MS
R AXIS - MUSE: 51 DEGREES
RBC # BLD AUTO: 4.89 10E6/UL (ref 3.8–5.9)
SODIUM SERPL-SCNC: 136 MMOL/L (ref 135–145)
SYSTOLIC BLOOD PRESSURE - MUSE: NORMAL MMHG
T AXIS - MUSE: 41 DEGREES
TROPONIN T SERPL HS-MCNC: 6 NG/L
TROPONIN T SERPL HS-MCNC: <6 NG/L
VENTRICULAR RATE- MUSE: 70 BPM
WBC # BLD AUTO: 4.5 10E3/UL (ref 4–11)

## 2025-06-01 PROCEDURE — 85379 FIBRIN DEGRADATION QUANT: CPT | Performed by: STUDENT IN AN ORGANIZED HEALTH CARE EDUCATION/TRAINING PROGRAM

## 2025-06-01 PROCEDURE — 250N000013 HC RX MED GY IP 250 OP 250 PS 637: Performed by: STUDENT IN AN ORGANIZED HEALTH CARE EDUCATION/TRAINING PROGRAM

## 2025-06-01 PROCEDURE — 85025 COMPLETE CBC W/AUTO DIFF WBC: CPT | Performed by: STUDENT IN AN ORGANIZED HEALTH CARE EDUCATION/TRAINING PROGRAM

## 2025-06-01 PROCEDURE — 71275 CT ANGIOGRAPHY CHEST: CPT

## 2025-06-01 PROCEDURE — 93005 ELECTROCARDIOGRAM TRACING: CPT | Performed by: STUDENT IN AN ORGANIZED HEALTH CARE EDUCATION/TRAINING PROGRAM

## 2025-06-01 PROCEDURE — 36415 COLL VENOUS BLD VENIPUNCTURE: CPT | Performed by: STUDENT IN AN ORGANIZED HEALTH CARE EDUCATION/TRAINING PROGRAM

## 2025-06-01 PROCEDURE — 84484 ASSAY OF TROPONIN QUANT: CPT | Performed by: STUDENT IN AN ORGANIZED HEALTH CARE EDUCATION/TRAINING PROGRAM

## 2025-06-01 PROCEDURE — 82040 ASSAY OF SERUM ALBUMIN: CPT | Performed by: STUDENT IN AN ORGANIZED HEALTH CARE EDUCATION/TRAINING PROGRAM

## 2025-06-01 PROCEDURE — 99284 EMERGENCY DEPT VISIT MOD MDM: CPT | Performed by: STUDENT IN AN ORGANIZED HEALTH CARE EDUCATION/TRAINING PROGRAM

## 2025-06-01 PROCEDURE — 71046 X-RAY EXAM CHEST 2 VIEWS: CPT

## 2025-06-01 PROCEDURE — 250N000011 HC RX IP 250 OP 636: Performed by: STUDENT IN AN ORGANIZED HEALTH CARE EDUCATION/TRAINING PROGRAM

## 2025-06-01 PROCEDURE — 99285 EMERGENCY DEPT VISIT HI MDM: CPT | Mod: 25 | Performed by: STUDENT IN AN ORGANIZED HEALTH CARE EDUCATION/TRAINING PROGRAM

## 2025-06-01 PROCEDURE — 250N000009 HC RX 250: Performed by: STUDENT IN AN ORGANIZED HEALTH CARE EDUCATION/TRAINING PROGRAM

## 2025-06-01 PROCEDURE — 93010 ELECTROCARDIOGRAM REPORT: CPT | Performed by: STUDENT IN AN ORGANIZED HEALTH CARE EDUCATION/TRAINING PROGRAM

## 2025-06-01 RX ORDER — IOPAMIDOL 755 MG/ML
500 INJECTION, SOLUTION INTRAVASCULAR ONCE
Status: COMPLETED | OUTPATIENT
Start: 2025-06-01 | End: 2025-06-01

## 2025-06-01 RX ORDER — MAGNESIUM HYDROXIDE/ALUMINUM HYDROXICE/SIMETHICONE 120; 1200; 1200 MG/30ML; MG/30ML; MG/30ML
15 SUSPENSION ORAL ONCE
Status: COMPLETED | OUTPATIENT
Start: 2025-06-01 | End: 2025-06-01

## 2025-06-01 RX ORDER — LIDOCAINE HYDROCHLORIDE 20 MG/ML
5 SOLUTION OROPHARYNGEAL ONCE
Status: COMPLETED | OUTPATIENT
Start: 2025-06-01 | End: 2025-06-01

## 2025-06-01 RX ORDER — SUCRALFATE 1 G/1
1 TABLET ORAL 4 TIMES DAILY
Qty: 60 TABLET | Refills: 0 | Status: SHIPPED | OUTPATIENT
Start: 2025-06-01 | End: 2025-06-16

## 2025-06-01 RX ADMIN — ALUMINUM HYDROXIDE, MAGNESIUM HYDROXIDE, AND SIMETHICONE 15 ML: 200; 200; 20 SUSPENSION ORAL at 02:29

## 2025-06-01 RX ADMIN — IOPAMIDOL 70 ML: 755 INJECTION, SOLUTION INTRAVENOUS at 04:17

## 2025-06-01 RX ADMIN — LIDOCAINE HYDROCHLORIDE 5 ML: 20 SOLUTION ORAL at 02:29

## 2025-06-01 RX ADMIN — SODIUM CHLORIDE 70 ML: 9 INJECTION, SOLUTION INTRAVENOUS at 04:17

## 2025-06-01 ASSESSMENT — ENCOUNTER SYMPTOMS
RHINORRHEA: 0
VOMITING: 0
APPETITE CHANGE: 0
EYE REDNESS: 0
ARTHRALGIAS: 0
ABDOMINAL PAIN: 0
CHILLS: 0
SHORTNESS OF BREATH: 0
DYSURIA: 0
DIARRHEA: 0
COUGH: 0
NECK STIFFNESS: 0
HEMATURIA: 0
FATIGUE: 0
SORE THROAT: 0
HEADACHES: 0
DIZZINESS: 0
ACTIVITY CHANGE: 0
FEVER: 0
MYALGIAS: 0
NAUSEA: 0

## 2025-06-01 ASSESSMENT — COLUMBIA-SUICIDE SEVERITY RATING SCALE - C-SSRS
2. HAVE YOU ACTUALLY HAD ANY THOUGHTS OF KILLING YOURSELF IN THE PAST MONTH?: NO
1. IN THE PAST MONTH, HAVE YOU WISHED YOU WERE DEAD OR WISHED YOU COULD GO TO SLEEP AND NOT WAKE UP?: NO
6. HAVE YOU EVER DONE ANYTHING, STARTED TO DO ANYTHING, OR PREPARED TO DO ANYTHING TO END YOUR LIFE?: NO

## 2025-06-01 ASSESSMENT — ACTIVITIES OF DAILY LIVING (ADL)
ADLS_ACUITY_SCORE: 41

## 2025-06-01 NOTE — ED TRIAGE NOTES
"Presents with chest pain that started at 10 pm. States it hurts worse with swallowing. \"Comes in waves\"     Triage Assessment (Adult)       Row Name 06/01/25 0134          Triage Assessment    Airway WDL WDL        Respiratory WDL    Respiratory WDL WDL        Skin Circulation/Temperature WDL    Skin Circulation/Temperature WDL WDL        Cardiac WDL    Cardiac WDL WDL        Peripheral/Neurovascular WDL    Peripheral Neurovascular WDL WDL                     "

## 2025-06-01 NOTE — DISCHARGE INSTRUCTIONS
No signs of PE on your imaging.  No other signs of vascular abnormalities on that imaging either.  Her troponin was less than 6 and EKG was reassuring.  Your symptoms were more consistent with a possible gastroenteritis.  Please follow-up with your primary care team on Monday or Tuesday to reevaluate to ensure continue improvement.  We sent some sucralfate to your pharmacy will help coat your belly if this is an underlying gastritis dyspepsia or peptic ulcer pathology that may require endoscopy.

## 2025-06-01 NOTE — ED PROVIDER NOTES
History     Chief Complaint   Patient presents with    Chest Pain     HPI  Bel Cruz is a 64 year old adult who senting with chest pain since 10:00 last night.  It has been intermittent centralized and upper epigastric discomfort.  She does not have any abdominal pain.  She has not any nausea or vomiting or fevers chills or diaphoresis.  She has no medication changes.  She is otherwise has no acute complaints about shortness of breath or cough.  The pain does radiate up into her throat episodically.  It is burning sensation like.  She is not any diarrhea or rashes.  She is suffering from urticaria and recently increase her Zyrtec and famotidine dosing.  No cardiac or pulmonary history.    Allergies:  Allergies   Allergen Reactions    Amoxicillin      tightness in throat    Cephalosporins      ceftin    Clindamycin Base      Nausea, lightheadeness    Erythromycin      Welts    Macrolides And Ketolides     Sulfa Antibiotics Swelling     bactrim    Sulfamethoxazole     Trimethoprim        Problem List:    Patient Active Problem List    Diagnosis Date Noted    Hives 03/27/2025     Priority: Medium    Urticaria 03/27/2025     Priority: Medium    Right knee pain, unspecified chronicity 03/27/2025     Priority: Medium    Irritable bowel syndrome with diarrhea 01/28/2025     Priority: Medium    History of colonic polyps 08/19/2022     Priority: Medium    Atrophic vaginitis 12/15/2015     Priority: Medium    Generalized anxiety disorder 06/27/2007     Priority: Medium    Hypothyroidism 08/02/2005     Priority: Medium        Past Medical History:    Past Medical History:   Diagnosis Date    Allergic rhinitis due to other allergen     Benign essential hypertension 6/10/2020    Esophageal reflux     GENERALIZED ANXIETY DIS 6/27/2007    Panic disorder without agoraphobia 1990    Trochanteric bursitis of right hip 8/2/2019    Unspecified hypothyroidism        Past Surgical History:    Past Surgical History:   Procedure  Laterality Date    BIOPSY  1999    Breast biopsy    COLONOSCOPY  7/26/2011    Procedure:COMBINED COLONOSCOPY, REMOVE TUMOR/POLYP/LESION BY SNARE; single polyp removal; Surgeon:YUE LIMON; Location:[facility]    COLONOSCOPY Left 6/24/2016    Procedure: COMBINED COLONOSCOPY, SINGLE OR MULTIPLE BIOPSY/POLYPECTOMY BY BIOPSY;  Surgeon: Joseph Keyes MD;  Location: [facility]    COLONOSCOPY WITH CO2 INSUFFLATION N/A 6/24/2016    Procedure: COLONOSCOPY WITH CO2 INSUFFLATION;  Surgeon: Joseph Keyes MD;  Location: [facility]    ESOPHAGOSCOPY, GASTROSCOPY, DUODENOSCOPY (EGD), COMBINED N/A 5/8/2017    Procedure: COMBINED ESOPHAGOSCOPY, GASTROSCOPY, DUODENOSCOPY (EGD), BIOPSY SINGLE OR MULTIPLE;  ESOPHAGOSCOPY, GASTROSCOPY, DUODENOSCOPY (EGD) wiith multiple biopsies;  Surgeon: Chavez Land MD;  Location:  GI    EYE SURGERY  6/2/20    Cataract Surgery both eyes    GYN SURGERY  2005    Ablation of uterus.    HC HYSTEROSCOPY DIAGOSTIC (SEPARATE PROC)  8/10/2004    Hysteroscopy, D&C, Endometrial ablation    PELVIS LAPAROSCOPY,DX  1996    Pelvic pain - adhesions and mild endometriosis    US BREAST CLIP PLACEMENT W BIOPSY LEFT         Family History:    Family History   Problem Relation Age of Onset    Hyperlipidemia Mother     Neuropathy Mother     Thyroid Disease Father         Hashimotos    Hypertension Father     Sjogren's Paternal Grandmother     Thyroid Disease Son     Sjogren's Paternal Aunt        Social History:  Marital Status:   [2]  Social History     Tobacco Use    Smoking status: Never    Smokeless tobacco: Never    Tobacco comments:     no smokers in household   Vaping Use    Vaping status: Never Used   Substance Use Topics    Alcohol use: No    Drug use: No        Medications:    cetirizine (ZYRTEC) 10 MG tablet  DULoxetine (CYMBALTA) 30 MG capsule  EPINEPHrine (ANY BX GENERIC EQUIV) 0.3 MG/0.3ML injection 2-pack  famotidine (PEPCID) 20 MG tablet  fexofenadine (ALLEGRA) 180 MG  tablet  fish oil-omega-3 fatty acids 1000 MG capsule  hydrOXYzine HCl (ATARAX) 25 MG tablet  levothyroxine (SYNTHROID/LEVOTHROID) 112 MCG tablet  sucralfate (CARAFATE) 1 GM tablet  vitamin B complex with vitamin C (VITAMIN  B COMPLEX) tablet          Review of Systems   Constitutional:  Negative for activity change, appetite change, chills, fatigue and fever.   HENT:  Negative for congestion, rhinorrhea and sore throat.    Eyes:  Negative for redness.   Respiratory:  Negative for cough and shortness of breath.    Cardiovascular:  Positive for chest pain.   Gastrointestinal:  Negative for abdominal pain, diarrhea, nausea and vomiting.   Genitourinary:  Negative for dysuria and hematuria.   Musculoskeletal:  Negative for arthralgias, myalgias and neck stiffness.   Skin:  Negative for rash.   Neurological:  Negative for dizziness and headaches.   All other systems reviewed and are negative.      Physical Exam   BP: (!) 183/108  Pulse: 68  Temp: 98  F (36.7  C)  Resp: 16  SpO2: 100 %      Physical Exam  Vitals and nursing note reviewed.   Constitutional:       General: Laura is not in acute distress.     Appearance: Normal appearance. Laura is well-developed and normal weight. Laura is not diaphoretic.   HENT:      Head: Atraumatic.      Mouth/Throat:      Mouth: Mucous membranes are moist.   Eyes:      General: No scleral icterus.     Conjunctiva/sclera: Conjunctivae normal.   Cardiovascular:      Rate and Rhythm: Normal rate and regular rhythm.      Heart sounds: Normal heart sounds.   Pulmonary:      Effort: Pulmonary effort is normal. No tachypnea or respiratory distress.      Breath sounds: Normal breath sounds. No decreased breath sounds or wheezing.   Abdominal:      General: Abdomen is flat.   Musculoskeletal:      Cervical back: Neck supple.      Right lower leg: No edema.      Left lower leg: No edema.   Skin:     General: Skin is warm.      Findings: No rash.   Neurological:      General: No focal deficit present.       Mental Status: Laura is alert and oriented to person, place, and time.   Psychiatric:         Mood and Affect: Mood normal.         ED Course        Procedures           EKG self interpreted at 1:43 AM.  Ventricular rate at 70 bpm with apparent follow-up 140 QRS is 86 and a QTc of 449.  She does have some changes in V1 V2 however these were present on previous EKGs with some minimal elevation.  She has some T wave flattening in V3 V4 V5 V6 and lead III and aVF without obvious signs of elevations depressions in any leads.  No ectopic beats or axis deviation.  Abnormal from baseline.         Results for orders placed or performed during the hospital encounter of 06/01/25 (from the past 24 hours)   EKG 12 lead   Result Value Ref Range    Systolic Blood Pressure  mmHg    Diastolic Blood Pressure  mmHg    Ventricular Rate 70 BPM    Atrial Rate 70 BPM    IA Interval 140 ms    QRS Duration 86 ms     ms    QTc 449 ms    P Axis 45 degrees    R AXIS 51 degrees    T Axis 41 degrees    Interpretation ECG       Sinus rhythm  Possible Left atrial enlargement  Nonspecific T wave abnormality  Abnormal ECG  No previous ECGs available     CBC with platelets differential    Narrative    The following orders were created for panel order CBC with platelets differential.  Procedure                               Abnormality         Status                     ---------                               -----------         ------                     CBC with platelets and ...[[id number]]                      Final result                 Please view results for these tests on the individual orders.   Troponin T, High Sensitivity   Result Value Ref Range    Troponin T, High Sensitivity 6 <=22 ng/L   Comprehensive metabolic panel   Result Value Ref Range    Sodium 136 135 - 145 mmol/L    Potassium 4.1 3.4 - 5.3 mmol/L    Carbon Dioxide (CO2) 24 22 - 29 mmol/L    Anion Gap 13 7 - 15 mmol/L    Urea Nitrogen 15.9 8.0 - 23.0 mg/dL     "Creatinine 0.90 0.51 - 1.17 mg/dL    GFR Estimate 71 >60 mL/min/1.73m2    Calcium 9.9 8.8 - 10.4 mg/dL    Chloride 99 98 - 107 mmol/L    Glucose 107 (H) 70 - 99 mg/dL    Alkaline Phosphatase 97 40 - 150 U/L    AST 26 0 - 45 U/L    ALT 18 0 - 70 U/L    Protein Total 7.5 6.4 - 8.3 g/dL    Albumin 4.0 3.5 - 5.2 g/dL    Bilirubin Total 0.3 <=1.2 mg/dL    Narrative    The generation of reference intervals for this test is currently based on binary male or female sex. If the electronic health record information indicates another gender identity or if Legal Sex is recorded as \"Unknown\", both male and female reference intervals are provided where applicable, and should be considered according to the individual's appropriate clinical context.   CBC with platelets and differential   Result Value Ref Range    WBC Count 4.5 4.0 - 11.0 10e3/uL    RBC Count 4.89 3.80 - 5.90 10e6/uL    Hemoglobin 14.3 11.7 - 17.7 g/dL    Hematocrit 41.7 35.0 - 53.0 %    MCV 85 78 - 100 fL    MCH 29.2 26.5 - 33.0 pg    MCHC 34.3 31.5 - 36.5 g/dL    RDW 12.4 10.0 - 15.0 %    Platelet Count 183 150 - 450 10e3/uL    % Neutrophils 64 %    % Lymphocytes 27 %    % Monocytes 9 %    % Eosinophils 0 %    % Basophils 0 %    % Immature Granulocytes 0 %    NRBCs per 100 WBC 0 <1 /100    Absolute Neutrophils 2.9 1.6 - 8.3 10e3/uL    Absolute Lymphocytes 1.2 0.8 - 5.3 10e3/uL    Absolute Monocytes 0.4 0.0 - 1.3 10e3/uL    Absolute Eosinophils 0.0 0.0 - 0.7 10e3/uL    Absolute Basophils 0.0 0.0 - 0.2 10e3/uL    Absolute Immature Granulocytes 0.0 <=0.4 10e3/uL    Absolute NRBCs 0.0 10e3/uL    Narrative    The generation of reference intervals for this test is currently based on binary male or female sex. If the electronic health record information indicates another gender identity or if Legal Sex is recorded as \"Unknown\", both male and female reference intervals are provided where applicable, and should be considered according to the individual's appropriate " clinical context.   Chest XR,  PA & LAT    Narrative    EXAM: XR CHEST 2 VIEWS  LOCATION: [facility]  DATE: 6/1/2025    INDICATION: Chest pain.  COMPARISON: Chest x-ray on 5/10/2013.      Impression    IMPRESSION: PA and lateral views of chest were obtained. Mild basilar pulmonary opacities likely atelectasis. No significant pleural effusion or pneumothorax.   D dimer quantitative   Result Value Ref Range    D-Dimer Quantitative 2.10 (H) 0.00 - 0.50 ug/mL FEU    Narrative    This D-dimer assay is intended for use in conjunction with a clinical pretest probability assessment model to exclude pulmonary embolism (PE) and deep venous thrombosis (DVT) in outpatients suspected of PE or DVT. The cut-off value is 0.50 ug/mL FEU.    For patients 50 years of age or older, the application of age-adjusted cut-off values for D-Dimer may increase the specificity without significant effect on sensitivity. The literature suggested calculation age adjusted cut-off in ug/L = age in years x 10 ug/L. The results in this laboratory are reported as ug/mL rather than ug/L. The calculation for age adjusted cut off in ug/mL= age in years x 0.01 ug/mL. For example, the cut off for a 76 year old male is 76 x 0.01 ug/mL = 0.76 ug/mL (760 ug/L).    M Shad et al. Age adjusted D-dimer cut-off levels to rule out pulmonary embolism: The ADJUST-PE Study. CANELO 2014;311:1720-8471.; HJ Zak et al. Diagnostic accuracy of conventional or age adjusted D-dimer cutoff values in older patients with suspected venous thromboembolism. Systemic review and meta-analysis. BMJ 2013:346:f2492.   Extra Tube    Narrative    The following orders were created for panel order Extra Tube.  Procedure                               Abnormality         Status                     ---------                               -----------         ------                     Extra Green Top (Lithiu...[[id number]]                      Final result                Extra Purple Top Tube[[id number]]                           Final result                 Please view results for these tests on the individual orders.   Extra Green Top (Lithium Heparin) Tube   Result Value Ref Range    Hold Specimen JIC    Extra Purple Top Tube   Result Value Ref Range    Hold Specimen JIC    Troponin T, High Sensitivity   Result Value Ref Range    Troponin T, High Sensitivity <6 <=22 ng/L   CT Chest Pulmonary Embolism w Contrast    Narrative    EXAM: CT CHEST PULMONARY EMBOLISM W CONTRAST  LOCATION: [facility]  DATE: 6/1/2025    INDICATION: ches tpain elevated dimer  COMPARISON: Chest radiograph today.  TECHNIQUE: CT chest pulmonary angiogram during arterial phase injection of IV contrast. Multiplanar reformats and MIP reconstructions were performed. Dose reduction techniques were used.   CONTRAST: 70 mL Isovue 370    FINDINGS:  ANGIOGRAM CHEST: No pulmonary embolus. No aortic aneurysm or dissection.    LUNGS AND PLEURA: Mild scarring at the lung apices. Mild atelectasis in the lower lungs and right upper lobe.    MEDIASTINUM/AXILLAE: Normal.    CORONARY ARTERY CALCIFICATION: Mild.    UPPER ABDOMEN: Cholelithiasis.    MUSCULOSKELETAL: Normal.      Impression    IMPRESSION:  No pulmonary embolus or other acute process in the chest.       Medications   alum & mag hydroxide-simethicone (MAALOX) suspension 15 mL (15 mLs Oral $Given 6/1/25 0229)   lidocaine (viscous) (XYLOCAINE) 2 % solution 5 mL (5 mLs Mouth/Throat $Given 6/1/25 0229)   iopamidol (ISOVUE-370) solution 500 mL (70 mLs Intravenous $Given 6/1/25 0417)   sodium chloride 0.9 % bag for CT scan flush (70 mLs Intravenous $Given 6/1/25 0417)       Assessments & Plan (with Medical Decision Making)     I have reviewed the nursing notes.    I have reviewed the findings, diagnosis, plan and need for follow up with the patient.      Medical Decision Making    Bel Cruz is a 64 year old adult who  senting with chest pain since 10:00 last night.  It has been intermittent centralized and upper epigastric discomfort.  She does not have any abdominal pain.  She has not any nausea or vomiting or fevers chills or diaphoresis.  She has no medication changes.  She is otherwise has no acute complaints about shortness of breath or cough.  The pain does radiate up into her throat episodically.  It is burning sensation like.  She is not any diarrhea or rashes.  She is suffering from urticaria and recently increase her Zyrtec and famotidine dosing.  No cardiac or pulmonary history.    Patient's vitals are reassuring with a blood pressure of 141/91 temperature of 98 pulse of 71 and oxygen saturation of 98% room air.  EKG is reassuring but there are some mild changes present with nonspecific T wave changes.  Her initial troponin is reassuring at 6 CBC and CMP unremarkable.  Chest x-ray without signs of obvious pneumonia or fluid consolidations or abnormalities.  Patient had almost complete resolution of burning chest discomfort with the GI cocktail.  Repeat troponin showing.      Low concern for ACS like pathology however there are new EKG changes even though troponins are unremarkable will benefit from outpatient follow-up with cardiology to have this further addressed.  Ultimately CT returned negative for any acute intrapulmonary abnormalities or vascular abnormalities including PE.  Repeat troponin less than 6.  We discussed findings with patient and importance of outpatient follow-up and provided sucralfate to patient for concerns of likely contributing gastritis versus dyspepsia versus reflux.  No other acute concerns currently present or need to have further evaluation completed and patient discharged home    New Prescriptions    SUCRALFATE (CARAFATE) 1 GM TABLET    Take 1 tablet (1 g) by mouth 4 times daily for 15 days.       Final diagnoses:   Chest pain, unspecified type   Nonspecific abnormal electrocardiogram  (ECG) (EKG)       6/1/2025   [facility] EMERGENCY DEPT       Jerardo Conteh MD  06/01/25 0572

## 2025-06-02 ENCOUNTER — PATIENT OUTREACH (OUTPATIENT)
Dept: CARE COORDINATION | Facility: CLINIC | Age: 65
End: 2025-06-02
Payer: COMMERCIAL

## 2025-06-02 ENCOUNTER — TELEPHONE (OUTPATIENT)
Dept: CARDIOLOGY | Facility: CLINIC | Age: 65
End: 2025-06-02
Payer: COMMERCIAL

## 2025-06-02 NOTE — TELEPHONE ENCOUNTER
[facility] Call Center    Phone Message    May a detailed message be left on voicemail: yes     Reason for Call: Other: please call pt back to schedule a new cardiology post hospital follow up appt in [place].      Action Taken: Other: cardiology     Travel Screening: Not Applicable      Thank you!  [facility]        Date of Service:

## 2025-06-04 ENCOUNTER — PATIENT OUTREACH (OUTPATIENT)
Dept: CARE COORDINATION | Facility: CLINIC | Age: 65
End: 2025-06-04
Payer: COMMERCIAL

## 2025-06-09 ENCOUNTER — MYC MEDICAL ADVICE (OUTPATIENT)
Dept: ALLERGY | Facility: OTHER | Age: 65
End: 2025-06-09
Payer: COMMERCIAL

## 2025-06-09 DIAGNOSIS — L50.9 HIVES: ICD-10-CM

## 2025-06-09 DIAGNOSIS — L50.9 URTICARIA: ICD-10-CM

## 2025-06-12 ENCOUNTER — OFFICE VISIT (OUTPATIENT)
Dept: FAMILY MEDICINE | Facility: OTHER | Age: 65
End: 2025-06-12
Payer: COMMERCIAL

## 2025-06-12 VITALS
HEART RATE: 78 BPM | DIASTOLIC BLOOD PRESSURE: 70 MMHG | TEMPERATURE: 96.6 F | BODY MASS INDEX: 27.55 KG/M2 | RESPIRATION RATE: 20 BRPM | OXYGEN SATURATION: 96 % | WEIGHT: 186 LBS | HEIGHT: 69 IN | SYSTOLIC BLOOD PRESSURE: 112 MMHG

## 2025-06-12 DIAGNOSIS — L50.9 URTICARIA: ICD-10-CM

## 2025-06-12 DIAGNOSIS — R94.31 NONSPECIFIC ST-T WAVE ELECTROCARDIOGRAPHIC CHANGES: ICD-10-CM

## 2025-06-12 DIAGNOSIS — K21.9 GASTROESOPHAGEAL REFLUX DISEASE, UNSPECIFIED WHETHER ESOPHAGITIS PRESENT: Primary | ICD-10-CM

## 2025-06-12 RX ORDER — OMEPRAZOLE 40 MG/1
40 CAPSULE, DELAYED RELEASE ORAL DAILY
Qty: 90 CAPSULE | Refills: 1 | Status: SHIPPED | OUTPATIENT
Start: 2025-06-12

## 2025-06-12 NOTE — PROGRESS NOTES
Assessment & Plan     Gastroesophageal reflux disease, unspecified whether esophagitis present  With her negative cardiac evaluation and response to GI medications this seems most likely GI in nature.  She will continue on her famotidine but will add omeprazole.  Because of the severity of her pain we will also refer her for upper endoscopy.    - omeprazole (PRILOSEC) 40 MG DR capsule; Take 1 capsule (40 mg) by mouth daily.  - Adult GI  Referral - Procedure Only; Future    Nonspecific ST-T wave electrocardiographic changes  Reviewed EKG from the ER as well as her last one in 2020.  Did give copies of these to the patient.  Did discuss the ST flattening in the lateral leads.  She will follow-up with cardiology.  She has multiple questions regarding her allergy medications and changes.    Urticaria  Recommend that she discuss these with her allergist.  It appears they are considering trying her on Xolair.    MED REC REQUIRED  Post Medication Reconciliation Status:  Patient was not discharged from an inpatient facility or TCU      Subjective   Laura is a 64 year old, presenting for the following health issues:  ER F/U and Hives      6/12/2025     7:48 AM   Additional Questions   Roomed by doris MCCONNELL        ED/UC Followup:    Facility:  [facility]  Date of visit: 6/1/25  Reason for visit: chest pain  Current Status: good, no chest pain since Saturday. She is sleeping sitting up, as that seems to help her so she wont get the chest pain.    Patient has been suffering with hives and following with allergy.  They have been changing up her medication.  However she is still getting hives.  She then developed some epigastric and midsternal chest pain that radiated to her jaw and was there for several hours and she went to the emergency department.  EKG had some flattening of her T waves but no evidence of acute MI.  Troponins were negative.  She had an elevated D-dimer but her chest CT was negative for  "clot.  Her pain improved with a GI cocktail.  She was already taking Pepcid for her hives.  Carafate was added and she finds the Carafate helpful .  She then found out if she lays down at night she gets recurrence of the chest pain but she does better if she sleeps sitting up.  Her hives worsened and she was placed on steroids.  She had another episode of several hours of chest pain but she did not go back to the emergency department as it felt similar to the past.  She states that the pain also radiates around her chest at her diaphragm level and feels like it is spasming.  She has a cardiology appointment next month secondary to her change in her EKGs.      Objective    /70 (BP Location: Right arm, Patient Position: Sitting, Cuff Size: Adult Regular)   Pulse 78   Temp (!) 96.6  F (35.9  C) (Temporal)   Resp 20   Ht 1.753 m (5' 9\")   Wt 84.4 kg (186 lb)   LMP 07/18/2005   SpO2 96%   BMI 27.47 kg/m    Body mass index is 27.47 kg/m .  Physical Exam   Gen: no apparent distress  Chest/CV: S1 and S2 normal, no murmurs, clicks, gallops or rubs. Regular rate and rhythm. Chest is clear; no wheezes or rales. No edema  Abd: The abdomen is soft without tenderness, guarding, mass, rebound or organomegaly. Bowel sounds are normal. No CVA tenderness or inguinal adenopathy noted.           Signed Electronically by: Lala Falcon MD    "

## 2025-06-13 NOTE — TELEPHONE ENCOUNTER
Routing refill request to provider for review/approval because:  famotidine (PEPCID) 20 MG tablet was previously prescribed by another provider. Patient is requesting this from Dr. Anderson.    fexofenadine (ALLEGRA) 180 MG tablet -patient is above 64 years old.    Patient is requesting 90 day supplies.    LOV: with Dr. Anderson on 05/28/2025, Follow-up in 4 weeks      Appointment scheduled with Dr. Anderson on 07/22/2025    BINA WheelerN, RN, PHN      Requested Prescriptions   Pending Prescriptions Disp Refills    famotidine (PEPCID) 20 MG tablet 180 tablet 0     Sig: Take 1 tablet (20 mg) by mouth 2 times daily.       H2 Blockers Protocol Passed - 6/13/2025  1:28 PM        Passed - Patient is age 12 or older        Passed - Medication is active on med list and the sig matches. RN to manually verify dose and sig if red X/fail.     If the protocol passes (green check), you do not need to verify med dose and sig.    A prescription matches if they are the same clinical intention.    For Example: once daily and every morning are the same.    The protocol can not identify upper and lower case letters as matching and will fail.     For Example: Take 1 tablet (50 mg) by mouth daily     TAKE 1 TABLET (50 MG) BY MOUTH DAILY    For all fails (red x), verify dose and sig.    If the refill does match what is on file, the RN can still proceed to approve the refill request.       If they do not match, route to the appropriate provider.             Passed - Medication indicated for associated diagnosis     Medication is associated with one or more of the following diagnoses:  Erosive esophagitis - Gastric hypersecretion   Gastric ulcer   Gastroesophageal reflux disease   Indigestion   Ulcer of duodenum   Esophagitis   Gastritis   Gastrointestinal hemorrhage   Stress ulcer; Prophylaxis   Helicobacter pylori gastrointestinal tract infection - Ulcer of duodenum  Zollinger-Kincaid syndrome  Cystic Fibrosis  Bronchiectasis             Passed - Recent (12 month) or future (90 days) visit with authorizing provider's specialty (provided they have been seen in the past 15 months)     The patient must have completed an in-person or virtual visit within the past 12 months or has a future visit scheduled within the next 90 days with the authorizing provider s specialty.  Urgent care and e-visits do not qualify as an office visit for this protocol.            fexofenadine (ALLEGRA) 180 MG tablet 90 tablet 0     Sig: Take 1 tablet (180 mg) by mouth at bedtime.       Antihistamines Protocol Failed - 6/13/2025  1:28 PM        Failed - Patient is 3-64 years of age     Apply weight-based dosing for peds patients age 3 - 12 years of age.    Forward request to provider for patients under the age of 3 or over the age of 64.          Passed - Medication is active on med list and the sig matches. RN to manually verify dose and sig if red X/fail.     If the protocol passes (green check), you do not need to verify med dose and sig.    A prescription matches if they are the same clinical intention.    For Example: once daily and every morning are the same.    The protocol can not identify upper and lower case letters as matching and will fail.     For Example: Take 1 tablet (50 mg) by mouth daily     TAKE 1 TABLET (50 MG) BY MOUTH DAILY    For all fails (red x), verify dose and sig.    If the refill does match what is on file, the RN can still proceed to approve the refill request.       If they do not match, route to the appropriate provider.             Passed - Recent (12 month) or future (90 days) visit with authorizing provider's specialty (provided they have been seen in the past 15 months)     The patient must have completed an in-person or virtual visit within the past 12 months or has a future visit scheduled within the next 90 days with the authorizing provider s specialty.  Urgent care and e-visits do not qualify as an office visit for this protocol.           Passed - Medication indicated for associated diagnosis     The medication is associated with one or more of the following diagnoses:  Allergies  Rhinitis  Upper respiratory tract allergy  Urticaria  Itching  Cystic Fibrosis  Bronchiectasis

## 2025-06-15 RX ORDER — FEXOFENADINE HCL 180 MG/1
180 TABLET ORAL AT BEDTIME
Qty: 90 TABLET | Refills: 0 | Status: SHIPPED | OUTPATIENT
Start: 2025-06-15

## 2025-06-15 RX ORDER — FAMOTIDINE 20 MG/1
20 TABLET, FILM COATED ORAL 2 TIMES DAILY
Qty: 180 TABLET | Refills: 0 | Status: SHIPPED | OUTPATIENT
Start: 2025-06-15

## 2025-06-16 ENCOUNTER — HOSPITAL ENCOUNTER (OUTPATIENT)
Facility: CLINIC | Age: 65
End: 2025-06-16
Attending: SURGERY | Admitting: SURGERY
Payer: COMMERCIAL

## 2025-06-16 ENCOUNTER — TELEPHONE (OUTPATIENT)
Dept: GASTROENTEROLOGY | Facility: CLINIC | Age: 65
End: 2025-06-16
Payer: COMMERCIAL

## 2025-06-16 NOTE — TELEPHONE ENCOUNTER
"Endoscopy Scheduling Screen    Caller: patient    Have you had any respiratory illness or flu-like symptoms in the last 10 days?  No    What is your communication preference for Instructions and/or Bowel Prep?   Tamihart    What insurance is in the chart?  Other:  [employer]ARE    Ordering/Referring Provider: CHRISTOPHER TOPETE   (If ordering provider performs procedure, schedule with ordering provider unless otherwise instructed. )    BMI: Estimated body mass index is 27.47 kg/m  as calculated from the following:    Height as of 6/12/25: 1.753 m (5' 9\").    Weight as of 6/12/25: 84.4 kg (186 lb).     Sedation Ordered  moderate sedation.   If patient BMI > 50 do not schedule in [facility].    If patient BMI > 45 do not schedule at [facility].    Are you taking methadone or Suboxone?  NO, No RN review required.    Have you been diagnosed and are being treated for severe PTSD or severe anxiety?  NO, No RN review required.    Are you taking any prescription medications for pain 3 or more times per week?   NO, No RN review required.    Do you have a history of malignant hyperthermia?  No    (Females) Are you currently pregnant?   No     Have you been diagnosed or told you have pulmonary hypertension?   No    Do you have an LVAD?  No    Have you been told you have moderate to severe sleep apnea?  No.    Have you been told you have COPD, asthma, or any other lung disease?  No    Has your doctor ordered any cardiac tests like echo, angiogram, stress test, ablation, or EKG, that you have not completed yet?  No    Do you  have a history of any heart conditions?  No     Have you ever had or are you waiting for an organ transplant?  No. Continue scheduling, no site restrictions.    Have you had a stroke or transient ischemic attack (TIA aka \"mini stroke\") in the last 2 years?   No.    Have you been diagnosed with or been told you have cirrhosis of the liver?   No.    Are you currently on dialysis?   No    Do you need assistance " "transferring?   No    BMI: Estimated body mass index is 27.47 kg/m  as calculated from the following:    Height as of 6/12/25: 1.753 m (5' 9\").    Weight as of 6/12/25: 84.4 kg (186 lb).     Is patients BMI > 40 and scheduling location [facility]?  No    Do you take an injectable or oral medication for weight loss or diabetes (excluding insulin)?  No    Do you take the medication Naltrexone?  No    Do you take blood thinners?  No       Prep   Are you currently on dialysis or do you have chronic kidney disease?  No    Do you have a diagnosis of diabetes?  No    Do you have a diagnosis of cystic fibrosis (CF)?  No    On a regular basis do you go 3 -5 days between bowel movements?  No    BMI > 40?  No    Preferred Pharmacy:    [id number] - [place], [place] [facility] [address] [address] [facility] [address]  [address] [facility] [address]  Phone: [phone] Fax: [phone]      Final Scheduling Details     Procedure scheduled  Upper endoscopy (EGD)    Surgeon:  TOMMY     Date of procedure:  7/3/25     Pre-OP / PAC:   No - Not required for this site.    Location  [facility] - Per order.    Sedation   MAC/Deep Sedation Per location.      Patient Reminders:   You will receive a call from a Nurse to review instructions and health history.  This assessment must be completed prior to your procedure.  Failure to complete the Nurse assessment may result in the procedure being cancelled.      On the day of your procedure, please designate an adult(s) who can drive you home stay with you for the next 24 hours. The medicines used in the exam will make you sleepy. You will not be able to drive.      You cannot take public transportation, ride share services, or non-medical taxi service without a responsible caregiver.  Medical transport services are allowed with the requirement that a responsible caregiver will receive you at your destination.  We require that drivers and caregivers are confirmed prior to your " procedure.

## 2025-06-18 ENCOUNTER — TELEPHONE (OUTPATIENT)
Dept: GASTROENTEROLOGY | Facility: CLINIC | Age: 65
End: 2025-06-18
Payer: COMMERCIAL

## 2025-06-18 NOTE — TELEPHONE ENCOUNTER
Pre visit planning completed.      Procedure details:    Patient scheduled for Upper endoscopy (EGD) on 7/3/25.     Arrival time: 1115. Procedure time 1215    Facility location: [facility]; [address] [place] [address] , [place] [address]. Check in location: Main entrance at Surgery registation desk.    Sedation type: MAC    Pre op exam needed? No.    Indication for procedure: Gastroesophageal reflux disease, unspecified whether esophagitis present       Chart review:     Electronic implanted devices? No    Recent diagnosis of diverticulitis within the last 6 weeks? No      Medication review:    Diabetic? No    Anticoagulants? No    Weight loss medication/injectable? No GLP-1 medication per patient's medication list. Nursing to verify with pre-assessment call.    Other medication HOLDING recommendations:  EGD: Sucralfate (Carafate): HOLD 1 day before procedure.      Prep for procedure:         Pro[facility] and instructions [facility] completed for upcoming procedure.   (Please see previous telephone encounter notes for complete details)      Sade Mendez RN  Endoscopy Procedure Pre Assessment   [phone] option 3

## 2025-06-18 NOTE — TELEPHONE ENCOUNTER
Pre assessment completed for upcoming procedure.   (Please see previous telephone encounter notes for complete details)    Procedure details:    Procedure date 7/3/25, arrival time 1115 and facility location reviewed.    Pre op exam needed? No.    Designated  policy reviewed. Instructed to have someone stay 24  hours post procedure.       Medication review:    Medications reviewed. Please see supporting documentation below. Holding recommendations discussed (if applicable).   Sucralfate (Carafate): HOLD 1 day before procedure.      Prep for procedure:     Procedure prep instructions reviewed.        Any additional information needed:  N/A      Patient verbalized understanding and had no questions or concerns at this time @1051.      Sade Mendez RN  Endoscopy Procedure Pre Assessment   [phone] option 3

## 2025-06-23 ENCOUNTER — TELEPHONE (OUTPATIENT)
Dept: ALLERGY | Facility: OTHER | Age: 65
End: 2025-06-23
Payer: COMMERCIAL

## 2025-06-23 NOTE — TELEPHONE ENCOUNTER
Patient called in and his having continued severe urticaria, despite taking Allegra 360mg BID and pepcid 20mg BID.  She was not able to tolerate hydroxyzine - said she was having severe heartburn while on it, although she says she is still experiencing this, so unsure if the hydroxyzine was really the culprit.  She had briefly discussed Xolair with Dr Anderson and is interested in starting this medication.  Scheduled her to come in tomorrow and discuss in depth with provider.    Rebekah Gautam MSN, RN   [facility], 6/23/2025 8:41 AM

## 2025-06-24 ENCOUNTER — OFFICE VISIT (OUTPATIENT)
Dept: ALLERGY | Facility: OTHER | Age: 65
End: 2025-06-24
Payer: COMMERCIAL

## 2025-06-24 VITALS
HEART RATE: 84 BPM | OXYGEN SATURATION: 99 % | SYSTOLIC BLOOD PRESSURE: 124 MMHG | BODY MASS INDEX: 28.03 KG/M2 | DIASTOLIC BLOOD PRESSURE: 80 MMHG | WEIGHT: 189.82 LBS

## 2025-06-24 DIAGNOSIS — L50.1 CHRONIC IDIOPATHIC URTICARIA: Primary | ICD-10-CM

## 2025-06-24 PROCEDURE — 3074F SYST BP LT 130 MM HG: CPT | Performed by: ALLERGY & IMMUNOLOGY

## 2025-06-24 PROCEDURE — 99214 OFFICE O/P EST MOD 30 MIN: CPT | Performed by: ALLERGY & IMMUNOLOGY

## 2025-06-24 PROCEDURE — 3079F DIAST BP 80-89 MM HG: CPT | Performed by: ALLERGY & IMMUNOLOGY

## 2025-06-24 RX ORDER — PREDNISONE 20 MG/1
40 TABLET ORAL DAILY
Qty: 10 TABLET | Refills: 0 | Status: SHIPPED | OUTPATIENT
Start: 2025-06-24 | End: 2025-06-24

## 2025-06-24 RX ORDER — PREDNISONE 20 MG/1
TABLET ORAL
Qty: 20 TABLET | Refills: 0 | Status: SHIPPED | OUTPATIENT
Start: 2025-06-24

## 2025-06-24 NOTE — LETTER
6/24/2025      Bel Cruz  [address] [facility] [address]      Dear Colleague,    Thank you for referring your patient, Bel Cruz, to the [facility]. Please see a copy of my visit note below.    SUBJECTIVE:                                                                   Bel Cruz presents today to our Allergy Clinic at [facility] for a follow up visit. Laura is a 64 year old adult with chronic spontaneous urticaria.  History of urticaria that started in March 2025. In April 2025, CBC with differential within normal limits, comprehensive metabolic panel within normal limits, thyroid antibody levels within normal limits, serum tryptase level within normal limits, negative serum IgE for alpha gal.  Mildly elevated Urticaria induced basophil activation index.  Despite taking fexofenadine 360 mg by mouth twice daily and famotidine 20 mg by mouth twice daily, the patient has been experiencing daily uncontrolled hives for several weeks. They also report intermittent mild angioedema involving the lips.        Patient Active Problem List   Diagnosis     Hypothyroidism     Generalized anxiety disorder     Atrophic vaginitis     Gastroesophageal reflux disease, unspecified whether esophagitis present     History of colonic polyps     Irritable bowel syndrome with diarrhea     Hives     Urticaria     Right knee pain, unspecified chronicity       Past Medical History:   Diagnosis Date     Allergic rhinitis due to other allergen      Benign essential hypertension 6/10/2020     Esophageal reflux      GENERALIZED ANXIETY DIS 6/27/2007     Panic disorder without agoraphobia 1990     Trochanteric bursitis of right hip 8/2/2019     Unspecified hypothyroidism     Graves - s/p ablation      Problem (# of Occurrences) Relation (Name,Age of Onset)    Hypertension (1) Father (Garo Quevedo)    Thyroid Disease (2) Father (Garo Quevedo): Hashimotos, Son (Kalpesh  Gothe)    Hyperlipidemia (1) Mother (Aniya Epstein)    Sjogren's (2) Paternal Grandmother, Paternal Aunt    Neuropathy (1) Mother (Aniya Epstein)          Past Surgical History:   Procedure Laterality Date     BIOPSY  1999    Breast biopsy     COLONOSCOPY  7/26/2011    Procedure:COMBINED COLONOSCOPY, REMOVE TUMOR/POLYP/LESION BY SNARE; single polyp removal; Surgeon:YUE LIMON; Location:[facility]     COLONOSCOPY Left 6/24/2016    Procedure: COMBINED COLONOSCOPY, SINGLE OR MULTIPLE BIOPSY/POLYPECTOMY BY BIOPSY;  Surgeon: Joseph Keyes MD;  Location: [facility] OR     COLONOSCOPY WITH CO2 INSUFFLATION N/A 6/24/2016    Procedure: COLONOSCOPY WITH CO2 INSUFFLATION;  Surgeon: Joseph Keyes MD;  Location: [facility] OR     ESOPHAGOSCOPY, GASTROSCOPY, DUODENOSCOPY (EGD), COMBINED N/A 5/8/2017    Procedure: COMBINED ESOPHAGOSCOPY, GASTROSCOPY, DUODENOSCOPY (EGD), BIOPSY SINGLE OR MULTIPLE;  ESOPHAGOSCOPY, GASTROSCOPY, DUODENOSCOPY (EGD) wiith multiple biopsies;  Surgeon: Chavez Land MD;  Location:  GI     EYE SURGERY  6/2/20    Cataract Surgery both eyes     GYN SURGERY  2005    Ablation of uterus.     HC HYSTEROSCOPY DIAGOSTIC (SEPARATE PROC)  8/10/2004    Hysteroscopy, D&C, Endometrial ablation     PELVIS LAPAROSCOPY,DX  1996    Pelvic pain - adhesions and mild endometriosis     US BREAST CLIP PLACEMENT W BIOPSY LEFT       Social History     Socioeconomic History     Marital status:      Spouse name: Barrera     Number of children: 2     Years of education: 15     Highest education level: None   Occupational History     Occupation:      Employer:    Tobacco Use     Smoking status: Never     Smokeless tobacco: Never     Tobacco comments:     no smokers in household   Vaping Use     Vaping status: Never Used   Substance and Sexual Activity     Alcohol use: No     Drug use: No     Sexual activity: Not Currently     Partners: Male     Birth control/protection: None      Comment: vasectomy   Other Topics Concern      Service No     Blood Transfusions No     Caffeine Concern No     Comment:  no pop, 1 cup coffee every am     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     Hobby Hazards No     Sleep Concern No     Stress Concern No     Weight Concern No     Special Diet No     Back Care Yes     Exercise Yes     Comment: 3-4 times a week     Bike Helmet Yes     Comment: biking      Seat Belt Yes     Self-Exams Yes     Comment: knows BSE, periodically, mammogram done 9/2/09     Parent/sibling w/ CABG, MI or angioplasty before 65F 55M? No   Social History Narrative    June 24, 2025        ENVIRONMENTAL HISTORY: [place] The home is heated with a forced air. They does have central air conditioning. The patient's bedroom is furnished with carpeting in bedroom and fabric window coverings.  Pets inside the house include 1 cat(s). There is no history of cockroach or mice infestation. There is/are 0 smokers in the house.  The house does have a damp basement.      Social Drivers of Health     Financial Resource Strain: Low Risk  (1/26/2025)    Financial Resource Strain      Within the past 12 months, have you or your family members you live with been unable to get utilities (heat, electricity) when it was really needed?: No   Food Insecurity: Low Risk  (1/26/2025)    Food Insecurity      Within the past 12 months, did you worry that your food would run out before you got money to buy more?: No      Within the past 12 months, did the food you bought just not last and you didn t have money to get more?: No   Transportation Needs: Low Risk  (1/26/2025)    Transportation Needs      Within the past 12 months, has lack of transportation kept you from medical appointments, getting your medicines, non-medical meetings or appointments, work, or from getting things that you need?: No   Physical Activity: Insufficiently Active  (1/26/2025)    Exercise Vital Sign      Days of Exercise per Week: 3 days      Minutes of Exercise per Session: 30 min   Stress: No Stress Concern Present (1/26/2025)    [employer] [place] [employer] - Occupational Stress Questionnaire      Feeling of Stress : Not at all   Social Connections: Unknown (1/26/2025)    Social Connection and Isolation Panel [NHANES]      Frequency of Social Gatherings with Friends and Family: Three times a week   Interpersonal Safety: Low Risk  (1/28/2025)    Interpersonal Safety      Do you feel physically and emotionally safe where you currently live?: Yes      Within the past 12 months, have you been hit, slapped, kicked or otherwise physically hurt by someone?: No      Within the past 12 months, have you been humiliated or emotionally abused in other ways by your partner or ex-partner?: No   Housing Stability: Low Risk  (1/26/2025)    Housing Stability      Do you have housing? : Yes      Are you worried about losing your housing?: No             Current Outpatient Medications:      DULoxetine (CYMBALTA) 30 MG capsule, Take 1 capsule (30 mg) by mouth daily., Disp: 90 capsule, Rfl: 3     EPINEPHrine (ANY BX GENERIC EQUIV) 0.3 MG/0.3ML injection 2-pack, Inject 0.3 mLs (0.3 mg) into the muscle as needed for anaphylaxis. May repeat one time in 5-15 minutes if response to initial dose is inadequate., Disp: 2 each, Rfl: 2     famotidine (PEPCID) 20 MG tablet, Take 1 tablet (20 mg) by mouth 2 times daily., Disp: 180 tablet, Rfl: 0     fexofenadine (ALLEGRA) 180 MG tablet, Take 1 tablet (180 mg) by mouth at bedtime., Disp: 90 tablet, Rfl: 0     fish oil-omega-3 fatty acids 1000 MG capsule, Take 2 g by mouth daily., Disp: , Rfl:      levothyroxine (SYNTHROID/LEVOTHROID) 112 MCG tablet, Take 1 tablet (112 mcg) by mouth daily., Disp: 90 tablet, Rfl: 3     omeprazole (PRILOSEC) 40 MG DR capsule, Take 1 capsule (40 mg) by mouth daily., Disp: 90 capsule, Rfl: 1     predniSONE  (DELTASONE) 20 MG tablet, Take 3 tabs by mouth daily x 3 days, then 2 tabs daily x 3 days, then 1 tab daily x 3 days, then 1/2 tab daily x 3 days., Disp: 20 tablet, Rfl: 0     vitamin B complex with vitamin C (VITAMIN  B COMPLEX) tablet, Take 1 tablet by mouth daily., Disp: , Rfl:      hydrOXYzine HCl (ATARAX) 25 MG tablet, Take 1 tablet (25 mg) by mouth every 8 hours as needed for itching or other (hives). (Patient not taking: Reported on 6/24/2025), Disp: 90 tablet, Rfl: 1    Current Facility-Administered Medications:      [START ON 7/8/2025] omalizumab (XOLAIR) injection 300 mg, 300 mg, Subcutaneous, Q28 Days,   Immunization History   Administered Date(s) Administered     COVID-19 Monovalent 18+ (Moderna) 02/04/2021, 04/21/2021, 01/03/2022     HEPA 07/07/1999, 02/21/2000     HepB 07/07/1999, 08/06/1999, 02/11/2000     Influenza (IIV3) PF 10/20/1997, 10/16/1998, 11/09/1999, 12/02/2002, 10/05/2010, 10/04/2011, 10/02/2012, 10/01/2013, 10/08/2014, 11/01/2015     Influenza (prior to 2024) 10/27/2009     Influenza Vaccine 18-64 (Flublok) 12/19/2019     Influenza Vaccine >6 months,quad, PF 09/30/2014, 10/10/2016, 09/27/2017, 10/11/2022, 10/26/2023     Influenza Vaccine, 6+MO IM (QUADRIVALENT W/PRESERVATIVES) 09/29/2015, 09/27/2017, 09/25/2018, 10/14/2021     Influenza,INJ,MDCK,PF,Quad >6mo(Flucelvax) 10/16/2020     TD,PF 7+ (Tenivac) 04/17/1996, 01/17/2017     TDAP (Adacel,Boostrix) 08/08/2006     Typhoid IM 08/01/1999     Allergies   Allergen Reactions     Amoxicillin      tightness in throat     Cephalosporins      ceftin     Clindamycin Base      Nausea, lightheadeness     Erythromycin      Welts     Macrolides And Ketolides      Sulfa Antibiotics Swelling     bactrim     Sulfamethoxazole      Trimethoprim      OBJECTIVE:                                                                 /80 (BP Location: Right arm, Patient Position: Sitting, Cuff Size: Adult Regular)   Pulse 84   Wt 86.1 kg (189 lb 13.1 oz)    LMP 07/18/2005   SpO2 99%   BMI 28.03 kg/m          Physical Exam  Vitals and nursing note reviewed.   Constitutional:       General: Laura is not in acute distress.     Appearance: Laura is not ill-appearing, toxic-appearing or diaphoretic.   HENT:      Head: Normocephalic and atraumatic.      Right Ear: Tympanic membrane, ear canal and external ear normal.      Left Ear: Tympanic membrane, ear canal and external ear normal.      Nose: No mucosal edema, congestion or rhinorrhea.      Right Turbinates: Not enlarged, swollen or pale.      Left Turbinates: Not enlarged, swollen or pale.      Mouth/Throat:      Lips: Pink.      Mouth: Mucous membranes are moist.      Pharynx: Oropharynx is clear. No pharyngeal swelling, oropharyngeal exudate, posterior oropharyngeal erythema or uvula swelling.   Eyes:      General:         Right eye: No discharge.         Left eye: No discharge.      Conjunctiva/sclera: Conjunctivae normal.   Pulmonary:      Effort: Pulmonary effort is normal. No respiratory distress.      Breath sounds: Normal breath sounds and air entry. No stridor, decreased air movement or transmitted upper airway sounds. No decreased breath sounds, wheezing, rhonchi or rales.   Skin:     General: Skin is warm.      Comments: Multiple urticarial lesions, ranging from dime- to quarter-sized, on upper lower extremities, chest, abdomen, and back.   Neurological:      Mental Status: Laura is alert and oriented to person, place, and time.   Psychiatric:         Mood and Affect: Mood normal.         Behavior: Behavior normal.           ASSESSMENT/PLAN:      Chronic idiopathic urticaria    The patient is non-binary and has poorly controlled chronic idiopathic urticaria despite a quadruple dose of antihistamines. They are unable to tolerate hydroxyzine, reporting that it worsens their chest discomfort and GERD symptoms.    Continue fexofenadine 260 mg by mouth at bedtime    Start a trial of levocetirizine 10 mg by mouth once  daily in the morning    Continue famotidine 20 mg by mouth twice daily    Given suboptimal symptom control, we discussed omalizumab therapy in detail. The patient expressed strong interest in proceeding.    We reviewed the commitment involved, the requirement to have an epinephrine auto-injector, and the potential risks--including anaphylaxis and possible cardiovascular events--as well as the potential benefits.    The patient is traveling out of state and requested a backup plan should symptoms worsen. A prednisone taper was prescribed.    - predniSONE (DELTASONE) 20 MG tablet  Dispense: 20 tablet; Refill: 0     Follow-up in 3 months or sooner if needed.    Thank you for allowing us to participate in the care of this patient. Please feel free to contact us if there are any questions or concerns about the patient.    Disclaimer: This note consists of symbols derived from keyboarding, dictation and/or voice recognition software. As a result, there may be errors in the script that have gone undetected. Please consider this when interpreting information found in this chart.    Consent was obtained from the patient to use an AI documentation tool in the creation of this note.     Paramjit Anderson MD, FAAAAI, FACAAI  Allergy, Asthma and Immunology     [facility]      Again, thank you for allowing me to participate in the care of your patient.        Sincerely,        Paramjit Anderson MD    Electronically signed

## 2025-06-24 NOTE — NURSING NOTE
Weekly Urticaria Activity Score     Score  (Itch Severity)  0 None               1 Mild              2 Moderate    3 Severe                Score  (Number of Hives)  0 = No hives  1 = 1-6 hives  2 = 7-12 hives  3 = More than 12 hives        Day    Itch Severity                        # of Hives                          Daily Total     1       0     1     2     3             +           0     1     2     3 =                  6  2       0     1     2     3             +           0     1     2     3 =           6  3       0     1     2     3             +           0     1     2     3 =         6  4       0     1     2     3             +           0     1     2     3 =          6  5      0     1     2     3             +           0     1     2     3 =           6  6     0     1     2     3             +           0     1     2     3 =         6  7      0     1     2     3             +           0     1     2     3 =      6      TOTAL (add up the numbers in the daily total column) = 42

## 2025-06-24 NOTE — PROGRESS NOTES
SUBJECTIVE:                                                                   Bel Cruz presents today to our Allergy Clinic at [facility] for a follow up visit. Laura is a 64 year old adult with chronic spontaneous urticaria.  History of urticaria that started in March 2025. In April 2025, CBC with differential within normal limits, comprehensive metabolic panel within normal limits, thyroid antibody levels within normal limits, serum tryptase level within normal limits, negative serum IgE for alpha gal.  Mildly elevated Urticaria induced basophil activation index.  Despite taking fexofenadine 360 mg by mouth twice daily and famotidine 20 mg by mouth twice daily, the patient has been experiencing daily uncontrolled hives for several weeks. They also report intermittent mild angioedema involving the lips.        Patient Active Problem List   Diagnosis    Hypothyroidism    Generalized anxiety disorder    Atrophic vaginitis    Gastroesophageal reflux disease, unspecified whether esophagitis present    History of colonic polyps    Irritable bowel syndrome with diarrhea    Hives    Urticaria    Right knee pain, unspecified chronicity       Past Medical History:   Diagnosis Date    Allergic rhinitis due to other allergen     Benign essential hypertension 6/10/2020    Esophageal reflux     GENERALIZED ANXIETY DIS 6/27/2007    Panic disorder without agoraphobia 1990    Trochanteric bursitis of right hip 8/2/2019    Unspecified hypothyroidism     Graves - s/p ablation      Problem (# of Occurrences) Relation (Name,Age of Onset)    Hypertension (1) Father (Garo Quevedo)    Thyroid Disease (2) Father (Garo Quevedo): Hashimotos, Son (Kalpesh Cruz)    Hyperlipidemia (1) Mother (Aniya Epstein)    Sjogren's (2) Paternal Grandmother, Paternal Aunt    Neuropathy (1) Mother (Aniya Epstein)          Past Surgical History:   Procedure Laterality Date    BIOPSY  1999    Breast biopsy    COLONOSCOPY   7/26/2011    Procedure:COMBINED COLONOSCOPY, REMOVE TUMOR/POLYP/LESION BY SNARE; single polyp removal; Surgeon:YUE LIMON; Location:[facility]    COLONOSCOPY Left 6/24/2016    Procedure: COMBINED COLONOSCOPY, SINGLE OR MULTIPLE BIOPSY/POLYPECTOMY BY BIOPSY;  Surgeon: Joseph Keyes MD;  Location: [facility]    COLONOSCOPY WITH CO2 INSUFFLATION N/A 6/24/2016    Procedure: COLONOSCOPY WITH CO2 INSUFFLATION;  Surgeon: Joseph Keyes MD;  Location: [facility]    ESOPHAGOSCOPY, GASTROSCOPY, DUODENOSCOPY (EGD), COMBINED N/A 5/8/2017    Procedure: COMBINED ESOPHAGOSCOPY, GASTROSCOPY, DUODENOSCOPY (EGD), BIOPSY SINGLE OR MULTIPLE;  ESOPHAGOSCOPY, GASTROSCOPY, DUODENOSCOPY (EGD) wiith multiple biopsies;  Surgeon: Chavez Land MD;  Location:  GI    EYE SURGERY  6/2/20    Cataract Surgery both eyes    GYN SURGERY  2005    Ablation of uterus.    HC HYSTEROSCOPY DIAGOSTIC (SEPARATE PROC)  8/10/2004    Hysteroscopy, D&C, Endometrial ablation    PELVIS LAPAROSCOPY,DX  1996    Pelvic pain - adhesions and mild endometriosis    US BREAST CLIP PLACEMENT W BIOPSY LEFT       Social History     Socioeconomic History    Marital status:      Spouse name: Barrera    Number of children: 2    Years of education: 15    Highest education level: None   Occupational History    Occupation:      Employer:    Tobacco Use    Smoking status: Never    Smokeless tobacco: Never    Tobacco comments:     no smokers in household   Vaping Use    Vaping status: Never Used   Substance and Sexual Activity    Alcohol use: No    Drug use: No    Sexual activity: Not Currently     Partners: Male     Birth control/protection: None     Comment: vasectomy   Other Topics Concern     Service No    Blood Transfusions No    Caffeine Concern No     Comment:  no pop, 1 cup coffee every am    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    Hobby Hazards No    Sleep Concern No    Stress  Concern No    Weight Concern No    Special Diet No    Back Care Yes    Exercise Yes     Comment: 3-4 times a week    Bike Helmet Yes     Comment: biking     Seat Belt Yes    Self-Exams Yes     Comment: knows BSE, periodically, mammogram done 9/2/09    Parent/sibling w/ CABG, MI or angioplasty before 65F 55M? No   Social History Narrative    June 24, 2025        ENVIRONMENTAL HISTORY: [place] The home is heated with a forced air. They does have central air conditioning. The patient's bedroom is furnished with carpeting in bedroom and fabric window coverings.  Pets inside the house include 1 cat(s). There is no history of cockroach or mice infestation. There is/are 0 smokers in the house.  The house does have a damp basement.      Social Drivers of Health     Financial Resource Strain: Low Risk  (1/26/2025)    Financial Resource Strain     Within the past 12 months, have you or your family members you live with been unable to get utilities (heat, electricity) when it was really needed?: No   Food Insecurity: Low Risk  (1/26/2025)    Food Insecurity     Within the past 12 months, did you worry that your food would run out before you got money to buy more?: No     Within the past 12 months, did the food you bought just not last and you didn t have money to get more?: No   Transportation Needs: Low Risk  (1/26/2025)    Transportation Needs     Within the past 12 months, has lack of transportation kept you from medical appointments, getting your medicines, non-medical meetings or appointments, work, or from getting things that you need?: No   Physical Activity: Insufficiently Active (1/26/2025)    Exercise Vital Sign     Days of Exercise per Week: 3 days     Minutes of Exercise per Session: 30 min   Stress: No Stress Concern Present (1/26/2025)    [employer] [place] [employer] - Occupational Stress Questionnaire     Feeling of Stress : Not at all   Social  Connections: Unknown (1/26/2025)    Social Connection and Isolation Panel [NHANES]     Frequency of Social Gatherings with Friends and Family: Three times a week   Interpersonal Safety: Low Risk  (1/28/2025)    Interpersonal Safety     Do you feel physically and emotionally safe where you currently live?: Yes     Within the past 12 months, have you been hit, slapped, kicked or otherwise physically hurt by someone?: No     Within the past 12 months, have you been humiliated or emotionally abused in other ways by your partner or ex-partner?: No   Housing Stability: Low Risk  (1/26/2025)    Housing Stability     Do you have housing? : Yes     Are you worried about losing your housing?: No             Current Outpatient Medications:     DULoxetine (CYMBALTA) 30 MG capsule, Take 1 capsule (30 mg) by mouth daily., Disp: 90 capsule, Rfl: 3    EPINEPHrine (ANY BX GENERIC EQUIV) 0.3 MG/0.3ML injection 2-pack, Inject 0.3 mLs (0.3 mg) into the muscle as needed for anaphylaxis. May repeat one time in 5-15 minutes if response to initial dose is inadequate., Disp: 2 each, Rfl: 2    famotidine (PEPCID) 20 MG tablet, Take 1 tablet (20 mg) by mouth 2 times daily., Disp: 180 tablet, Rfl: 0    fexofenadine (ALLEGRA) 180 MG tablet, Take 1 tablet (180 mg) by mouth at bedtime., Disp: 90 tablet, Rfl: 0    fish oil-omega-3 fatty acids 1000 MG capsule, Take 2 g by mouth daily., Disp: , Rfl:     levothyroxine (SYNTHROID/LEVOTHROID) 112 MCG tablet, Take 1 tablet (112 mcg) by mouth daily., Disp: 90 tablet, Rfl: 3    omeprazole (PRILOSEC) 40 MG DR capsule, Take 1 capsule (40 mg) by mouth daily., Disp: 90 capsule, Rfl: 1    predniSONE (DELTASONE) 20 MG tablet, Take 3 tabs by mouth daily x 3 days, then 2 tabs daily x 3 days, then 1 tab daily x 3 days, then 1/2 tab daily x 3 days., Disp: 20 tablet, Rfl: 0    vitamin B complex with vitamin C (VITAMIN  B COMPLEX) tablet, Take 1 tablet by mouth daily., Disp: , Rfl:     hydrOXYzine HCl (ATARAX) 25 MG  tablet, Take 1 tablet (25 mg) by mouth every 8 hours as needed for itching or other (hives). (Patient not taking: Reported on 6/24/2025), Disp: 90 tablet, Rfl: 1    Current Facility-Administered Medications:     [START ON 7/8/2025] omalizumab (XOLAIR) injection 300 mg, 300 mg, Subcutaneous, Q28 Days,   Immunization History   Administered Date(s) Administered    COVID-19 Monovalent 18+ (Moderna) 02/04/2021, 04/21/2021, 01/03/2022    HEPA 07/07/1999, 02/21/2000    HepB 07/07/1999, 08/06/1999, 02/11/2000    Influenza (IIV3) PF 10/20/1997, 10/16/1998, 11/09/1999, 12/02/2002, 10/05/2010, 10/04/2011, 10/02/2012, 10/01/2013, 10/08/2014, 11/01/2015    Influenza (prior to 2024) 10/27/2009    Influenza Vaccine 18-64 (Flublok) 12/19/2019    Influenza Vaccine >6 months,quad, PF 09/30/2014, 10/10/2016, 09/27/2017, 10/11/2022, 10/26/2023    Influenza Vaccine, 6+MO IM (QUADRIVALENT W/PRESERVATIVES) 09/29/2015, 09/27/2017, 09/25/2018, 10/14/2021    Influenza,INJ,MDCK,PF,Quad >6mo(Flucelvax) 10/16/2020    TD,PF 7+ (Tenivac) 04/17/1996, 01/17/2017    TDAP (Adacel,Boostrix) 08/08/2006    Typhoid IM 08/01/1999     Allergies   Allergen Reactions    Amoxicillin      tightness in throat    Cephalosporins      ceftin    Clindamycin Base      Nausea, lightheadeness    Erythromycin      Welts    Macrolides And Ketolides     Sulfa Antibiotics Swelling     bactrim    Sulfamethoxazole     Trimethoprim      OBJECTIVE:                                                                 /80 (BP Location: Right arm, Patient Position: Sitting, Cuff Size: Adult Regular)   Pulse 84   Wt 86.1 kg (189 lb 13.1 oz)   LMP 07/18/2005   SpO2 99%   BMI 28.03 kg/m          Physical Exam  Vitals and nursing note reviewed.   Constitutional:       General: Laura is not in acute distress.     Appearance: Laura is not ill-appearing, toxic-appearing or diaphoretic.   HENT:      Head: Normocephalic and atraumatic.      Right Ear: Tympanic membrane, ear canal and  external ear normal.      Left Ear: Tympanic membrane, ear canal and external ear normal.      Nose: No mucosal edema, congestion or rhinorrhea.      Right Turbinates: Not enlarged, swollen or pale.      Left Turbinates: Not enlarged, swollen or pale.      Mouth/Throat:      Lips: Pink.      Mouth: Mucous membranes are moist.      Pharynx: Oropharynx is clear. No pharyngeal swelling, oropharyngeal exudate, posterior oropharyngeal erythema or uvula swelling.   Eyes:      General:         Right eye: No discharge.         Left eye: No discharge.      Conjunctiva/sclera: Conjunctivae normal.   Pulmonary:      Effort: Pulmonary effort is normal. No respiratory distress.      Breath sounds: Normal breath sounds and air entry. No stridor, decreased air movement or transmitted upper airway sounds. No decreased breath sounds, wheezing, rhonchi or rales.   Skin:     General: Skin is warm.      Comments: Multiple urticarial lesions, ranging from dime- to quarter-sized, on upper lower extremities, chest, abdomen, and back.   Neurological:      Mental Status: Laura is alert and oriented to person, place, and time.   Psychiatric:         Mood and Affect: Mood normal.         Behavior: Behavior normal.           ASSESSMENT/PLAN:      Chronic idiopathic urticaria    The patient is non-binary and has poorly controlled chronic idiopathic urticaria despite a quadruple dose of antihistamines. They are unable to tolerate hydroxyzine, reporting that it worsens their chest discomfort and GERD symptoms.    Continue fexofenadine 260 mg by mouth at bedtime    Start a trial of levocetirizine 10 mg by mouth once daily in the morning    Continue famotidine 20 mg by mouth twice daily    Given suboptimal symptom control, we discussed omalizumab therapy in detail. The patient expressed strong interest in proceeding.    We reviewed the commitment involved, the requirement to have an epinephrine auto-injector, and the potential risks--including  anaphylaxis and possible cardiovascular events--as well as the potential benefits.    The patient is traveling out of state and requested a backup plan should symptoms worsen. A prednisone taper was prescribed.    - predniSONE (DELTASONE) 20 MG tablet  Dispense: 20 tablet; Refill: 0     Follow-up in 3 months or sooner if needed.    Thank you for allowing us to participate in the care of this patient. Please feel free to contact us if there are any questions or concerns about the patient.    Disclaimer: This note consists of symbols derived from keyboarding, dictation and/or voice recognition software. As a result, there may be errors in the script that have gone undetected. Please consider this when interpreting information found in this chart.    Consent was obtained from the patient to use an AI documentation tool in the creation of this note.     Paramjit Anderson MD, FAAAAI, FACAAI  Allergy, Asthma and Immunology     [facility]

## 2025-06-24 NOTE — PATIENT INSTRUCTIONS
Xyzal (levocetirizine) 2 pills at bedtime and fexofenadine 2 pills in the morning.  Continue famotidine without changes.     You were counseled regarding the time commitment, auto injectable epinephrine device policy, risks, including anaphylaxis, possible cardiovascular events, and benefits of omalizumab therapy and Laura wishes to proceed.

## 2025-06-25 ENCOUNTER — TELEPHONE (OUTPATIENT)
Dept: ALLERGY | Facility: OTHER | Age: 65
End: 2025-06-25
Payer: COMMERCIAL

## 2025-06-25 NOTE — TELEPHONE ENCOUNTER
Prior Authorization Infusion/Clinic Administered Request    Location: [place]  Diagnosis and ICD:Chronic idiopathic urticaria [L50.1]   Drug/Therapy: Xolair 300 mg every 28 days    Previously Tried and Failed Therapies: fexofenadine 360 mg by mouth twice daily and famotidine 20 mg by mouth twice daily (still experiencing hives), unable to tolerate hydroxyzine.    Date of provider note with supporting information: 6/24/2025    ANGELIC Evans, RN, PHN 6/25/2025 10:10 AM

## 2025-07-01 ENCOUNTER — TRANSFERRED RECORDS (OUTPATIENT)
Dept: ADMINISTRATIVE | Facility: CLINIC | Age: 65
End: 2025-07-01
Payer: COMMERCIAL

## 2025-07-02 RX ORDER — NALOXONE HYDROCHLORIDE 0.4 MG/ML
0.2 INJECTION, SOLUTION INTRAMUSCULAR; INTRAVENOUS; SUBCUTANEOUS
OUTPATIENT
Start: 2025-07-02

## 2025-07-02 RX ORDER — ONDANSETRON 4 MG/1
4 TABLET, ORALLY DISINTEGRATING ORAL EVERY 6 HOURS PRN
OUTPATIENT
Start: 2025-07-02

## 2025-07-02 RX ORDER — FLUMAZENIL 0.1 MG/ML
0.2 INJECTION, SOLUTION INTRAVENOUS
OUTPATIENT
Start: 2025-07-02 | End: 2025-07-03

## 2025-07-02 RX ORDER — LIDOCAINE 40 MG/G
CREAM TOPICAL
OUTPATIENT
Start: 2025-07-02

## 2025-07-02 RX ORDER — NALOXONE HYDROCHLORIDE 0.4 MG/ML
0.4 INJECTION, SOLUTION INTRAMUSCULAR; INTRAVENOUS; SUBCUTANEOUS
OUTPATIENT
Start: 2025-07-02

## 2025-07-02 RX ORDER — ONDANSETRON 2 MG/ML
4 INJECTION INTRAMUSCULAR; INTRAVENOUS
OUTPATIENT
Start: 2025-07-02

## 2025-07-02 RX ORDER — PROCHLORPERAZINE MALEATE 5 MG/1
5 TABLET ORAL EVERY 6 HOURS PRN
OUTPATIENT
Start: 2025-07-02

## 2025-07-02 RX ORDER — ONDANSETRON 2 MG/ML
4 INJECTION INTRAMUSCULAR; INTRAVENOUS EVERY 6 HOURS PRN
OUTPATIENT
Start: 2025-07-02

## 2025-07-03 ENCOUNTER — ANESTHESIA EVENT (OUTPATIENT)
Dept: GASTROENTEROLOGY | Facility: CLINIC | Age: 65
End: 2025-07-03

## 2025-07-03 ENCOUNTER — ANESTHESIA (OUTPATIENT)
Dept: GASTROENTEROLOGY | Facility: CLINIC | Age: 65
End: 2025-07-03

## 2025-07-03 RX ORDER — DEXAMETHASONE SODIUM PHOSPHATE 10 MG/ML
4 INJECTION, SOLUTION INTRAMUSCULAR; INTRAVENOUS
OUTPATIENT
Start: 2025-07-03

## 2025-07-03 RX ORDER — NALOXONE HYDROCHLORIDE 0.4 MG/ML
0.1 INJECTION, SOLUTION INTRAMUSCULAR; INTRAVENOUS; SUBCUTANEOUS
OUTPATIENT
Start: 2025-07-03

## 2025-07-03 RX ORDER — ONDANSETRON 2 MG/ML
4 INJECTION INTRAMUSCULAR; INTRAVENOUS EVERY 30 MIN PRN
OUTPATIENT
Start: 2025-07-03

## 2025-07-03 RX ORDER — ONDANSETRON 4 MG/1
4 TABLET, ORALLY DISINTEGRATING ORAL EVERY 30 MIN PRN
OUTPATIENT
Start: 2025-07-03

## 2025-07-03 NOTE — ANESTHESIA PREPROCEDURE EVALUATION
Anesthesia Pre-Procedure Evaluation    Patient: Bel Cruz   MRN: [id number] : 1960          Procedure : Procedure(s):  Esophagoscopy, gastroscopy, duodenoscopy (EGD), combined         Past Medical History:   Diagnosis Date    Allergic rhinitis due to other allergen     Benign essential hypertension 6/10/2020    Esophageal reflux     GENERALIZED ANXIETY DIS 2007    Panic disorder without agoraphobia     Trochanteric bursitis of right hip 2019    Unspecified hypothyroidism     Graves - s/p ablation      Past Surgical History:   Procedure Laterality Date    BIOPSY      Breast biopsy    COLONOSCOPY  2011    Procedure:COMBINED COLONOSCOPY, REMOVE TUMOR/POLYP/LESION BY SNARE; single polyp removal; Surgeon:YUE LIMON; Location:[facility]    COLONOSCOPY Left 2016    Procedure: COMBINED COLONOSCOPY, SINGLE OR MULTIPLE BIOPSY/POLYPECTOMY BY BIOPSY;  Surgeon: Joseph Keyes MD;  Location: [facility]    COLONOSCOPY WITH CO2 INSUFFLATION N/A 2016    Procedure: COLONOSCOPY WITH CO2 INSUFFLATION;  Surgeon: Joseph Keyes MD;  Location: [facility]    ESOPHAGOSCOPY, GASTROSCOPY, DUODENOSCOPY (EGD), COMBINED N/A 2017    Procedure: COMBINED ESOPHAGOSCOPY, GASTROSCOPY, DUODENOSCOPY (EGD), BIOPSY SINGLE OR MULTIPLE;  ESOPHAGOSCOPY, GASTROSCOPY, DUODENOSCOPY (EGD) wiith multiple biopsies;  Surgeon: Chavez Land MD;  Location:  GI    EYE SURGERY  20    Cataract Surgery both eyes    GYN SURGERY      Ablation of uterus.    HC HYSTEROSCOPY DIAGOSTIC (SEPARATE PROC)  8/10/2004    Hysteroscopy, D&C, Endometrial ablation    PELVIS LAPAROSCOPY,DX      Pelvic pain - adhesions and mild endometriosis    US BREAST CLIP PLACEMENT W BIOPSY LEFT        Allergies   Allergen Reactions    Amoxicillin      tightness in throat    Cephalosporins      ceftin    Clindamycin Base      Nausea, lightheadeness    Erythromycin      Welts    Macrolides And Ketolides     Sulfa  Antibiotics Swelling     bactrim    Sulfamethoxazole     Trimethoprim       Social History     Tobacco Use    Smoking status: Never    Smokeless tobacco: Never    Tobacco comments:     no smokers in household   Substance Use Topics    Alcohol use: No      Wt Readings from Last 1 Encounters:   06/24/25 86.1 kg (189 lb 13.1 oz)        Anesthesia Evaluation   Pt has had prior anesthetic. Type: MAC and General.        ROS/MED HX  ENT/Pulmonary:     (+)           allergic rhinitis,                             Neurologic:  - neg neurologic ROS     Cardiovascular:     (+)  hypertension- -   -  - -                                 Previous cardiac testing   Echo: Date: 2020 Results:  Interpretation Summary     The left ventricle is normal in size.  Left ventricular systolic function is normal.  The visual ejection fraction is estimated at 55-60%.  Normal left ventricular wall motion  The right ventricle is normal in structure, function and size.  No hemodynamically significant valvular abnormalities on 2D or color flow  imaging. There is no comparison study available.    Stress Test:  Date: Results:    ECG Reviewed:  Date: 6/1/25 Results:  Sinus rhythm   Possible Left atrial enlargement   Nonspecific T wave abnormality   Abnormal ECG     Cath:  Date: Results:      METS/Exercise Tolerance:     Hematologic:  - neg hematologic  ROS     Musculoskeletal:  - neg musculoskeletal ROS     GI/Hepatic:     (+) GERD,      Inflammatory bowel disease,             Renal/Genitourinary:  - neg Renal ROS     Endo:     (+)          thyroid problem, hypothyroidism,           Psychiatric/Substance Use:     (+) psychiatric history anxiety       Infectious Disease:  - neg infectious disease ROS     Malignancy:  - neg malignancy ROS     Other:  - neg other ROS            Physical Exam  Airway  Mallampati: II  TM distance: >3 FB  Neck ROM: full  Upper bite lip test: I  Mouth opening: >= 4 cm    Cardiovascular - normal exam  Rhythm: regular  Rate:  "normal rate     Dental   (+) Completely normal teeth      Pulmonary Breath sounds clear to auscultation        Neurological - normal exam  Laura appears awake, alert and oriented x3.    Other Findings       OUTSIDE LABS:  CBC:   Lab Results   Component Value Date    WBC 4.5 06/01/2025    WBC 5.0 04/29/2025    HGB 14.3 06/01/2025    HGB 14.7 04/29/2025    HCT 41.7 06/01/2025    HCT 43.2 04/29/2025     06/01/2025     04/29/2025     BMP:   Lab Results   Component Value Date     06/01/2025     04/29/2025    POTASSIUM 4.1 06/01/2025    POTASSIUM 4.0 04/29/2025    CHLORIDE 99 06/01/2025    CHLORIDE 103 04/29/2025    CO2 24 06/01/2025    CO2 32 (H) 04/29/2025    BUN 15.9 06/01/2025    BUN 13.8 04/29/2025    CR 0.90 06/01/2025    CR 0.89 04/29/2025     (H) 06/01/2025    GLC 99 04/29/2025     COAGS: No results found for: \"PTT\", \"INR\", \"FIBR\"  POC:   Lab Results   Component Value Date    HCG Negative 08/10/2004     HEPATIC:   Lab Results   Component Value Date    ALBUMIN 4.0 06/01/2025    PROTTOTAL 7.5 06/01/2025    ALT 18 06/01/2025    AST 26 06/01/2025    ALKPHOS 97 06/01/2025    BILITOTAL 0.3 06/01/2025    BILIDIRECT <0.1 04/28/2003     OTHER:   Lab Results   Component Value Date    A1C 5.7 (H) 01/03/2023    MELANIE 9.9 06/01/2025    MAG 2.1 01/30/2024    LIPASE 116 12/02/2016    TSH 2.08 01/28/2025    T4 0.65 (L) 05/03/2019    CRP <2.9 02/18/2022    SED 18 01/28/2025       Anesthesia Plan    ASA Status:  2      NPO Status: NPO Appropriate   Anesthesia Type: MAC.  Airway: natural airway.  Induction: intravenous.  Maintenance: TIVA.   Techniques and Equipment:       - Monitoring Plan: standard ASA monitoring     Consents    Anesthesia Plan(s) and associated risks, benefits, and realistic alternatives discussed. Questions answered and patient/representative(s) expressed understanding.     - Discussed: proceduralist     - Discussed with:  Patient        - Pt is DNR/DNI Status: no DNR     Blood " "Consent:      - Discussed with: patient.     Postoperative Care         Comments:                   RUBÉN Marie CRNA    I have reviewed the pertinent notes and labs in the chart from the past 30 days and (re)examined the patient.  Any updates or changes from those notes are reflected in this note.    Clinically Significant Risk Factors Present on Admission                             # Overweight: Estimated body mass index is 28.03 kg/m  as calculated from the following:    Height as of 6/12/25: 1.753 m (5' 9\").    Weight as of 6/24/25: 86.1 kg (189 lb 13.1 oz).                    "

## 2025-07-03 NOTE — DISCHARGE INSTRUCTIONS
[facility]    Home Care Following Endoscopy          Activity:  You have just undergone an endoscopic procedure usually performed with conscious sedation.  Do not work or operate machinery (including a car) for at least 12 hours.    I encourage you to walk and attempt to pass this air as soon as possible.    Diet:  Return to the diet you were on before your procedure but eat lightly for the first 12-24 hours.  Drink plenty of water.  Resume any regular medications unless otherwise advised by your physician.  Please begin any new medication prescribed as a result of your procedure as directed by your physician.   If you had any biopsy or polyp removed please refrain from aspirin or aspirin products for 2 days.  If on Coumadin please restart as instructed by your physician.   Pain:  You may take Tylenol as needed for pain.  Expected Recovery:  You can expect some mild abdominal fullness and/or discomfort due to the air used to inflate your intestinal tract. It is also normal to have a mild sore throat after upper endoscopy.    Call Your Physician if You Have:  After Upper Endoscopy:  Shoulder, back or chest pain.  Difficulty breathing or swallowing.  Vomiting blood.    Any questions or concerns about your recovery, please call [phone] or after hours [facility] ([phone]) Nurse Advice Line.    Follow-up Care:  You did have polyps/biopsy tissue sample(s) removed.  The polyps/biopsy tissue sample(s) will be sent to pathology.    You should receive letter in your My Chart from Dr. Smyth with your results within 1-2 weeks. If you do not participate in My Chart a physical letter will come in the mail in 2-3 weeks.  Please call if you have not received a notification of your results.  If asked to return to clinic please make an appointment 1 week after your procedure.  Call [phone].

## 2025-07-09 NOTE — PROCEDURES
[facility]   [address] [place] [address], [address], [place] [address]     Patient Name: Bel Cruz  Gender:  Female  Exam Date: 07/01/2025 Visit Number:  [id number]  Age: 64 Years YOB: 1960  Attending MD: Cipriano Huynh MD Medical Record#:  [id number]  -----------------------------------------------------------------------------------------------------------------------------   Procedure:    Upper GI Endoscopy   Indications:    Chest Pain   Heartburn  Provider:        Anjana Willis RD   Referring MD: Lala Falcon MD   Primary MD:      Lala Falcon MD  Medications:   Admitting Medication:   0.9% Normal Saline at [facility]   Intra Procedure Medications:   Patient received monitored anesthesia care.     Complications: No immediate complications  ___________________________________________________________________________________________  Procedure:   An examination of the heart and lungs was performed within acceptable limits.  . The patient was therefore deemed a reasonable candidate for sedation.   The risks and benefits were explained to the patient, who appeared to understand. After obtaining informed consent, the scope was passed under direct vision. Throughout the procedure the patient's blood pressure, pulse and oxygen saturations were monitored.  The scope was introduced through the mouth and advanced to the second portion of duodenum.         Findings:   Esophagus:    Normal esophagus.   The z-line is 41 centimeters from the incisors.  Top of the gastric folds is 41 centimeters from the incisors.  *Esophagus Comments:  Biopsies obtained from the esophagus.  Stomach:    Normal stomach.  The diaphragm hiatus is at 41 centimeters from the incisors.  Duodenum:    Normal duodenum.  Impression:   Heartburn  Atypical chest pain    Preliminary Plan:  Recommendation Comments:  Await pathology results.  Pathology Results:  A: ESOPHAGUS, BIOPSY:           1. Squamous mucosa with  no diagnostic abnormalities           2. Negative for reflux changes and eosinophilic esophagitis           3. Negative for columnar mucosa      MICROSCOPIC  A: Performed     Electronically signed by: Mary Campos MD    Interpreted at [facility], [address] [place] [id number]      _Electronically signed by:___________________  Cipriano Huynh MD                 07/01/2025    cc: Lala Falcon MD  cc: Lala Falcon MD

## 2025-07-10 NOTE — TELEPHONE ENCOUNTER
Patient responded to [employer] message and is wanting to check cost. Patient does also have an appointment with Dr. Anderson 7/22/25.    BINA EvansN, RN, PHN 7/10/2025 4:24 PM

## 2025-07-16 NOTE — PROGRESS NOTES
SUBJECTIVE:                                                               Bel Cruz presents today to our Allergy Clinic at [facility] for a follow up visit. Laura is a 64 year old adult with chronic spontaneous urticaria.  History of urticaria that started in March 2025. In April 2025, CBC with differential within normal limits, comprehensive metabolic panel within normal limits, thyroid antibody levels within normal limits, serum tryptase level within normal limits, negative serum IgE for alpha gal. Mildly elevated Urticaria induced basophil activation index.     When last seen in June 2025, the patient s symptoms were not well-controlled despite taking fexofenadine 360 mg by mouth twice daily and famotidine 20 mg by mouth twice daily. At that time, it was agreed to initiate omalizumab; however, the patient did not start it, as [place] were [place] on multiple occasions over the past four weeks.    Since then, the patient experienced a significant flare with lip angioedema and required another course of prednisone. They continue to have hives, though the symptoms are milder than before.    The patient also reports persistent heartburn. Their current regimen includes omeprazole 40 mg by mouth once daily and famotidine 20 mg by mouth twice daily.    An upper endoscopy performed on July 1 showed a normal esophagus, with no findings suggestive of reflux disease or eosinophilic esophagitis.      Patient Active Problem List   Diagnosis    Hypothyroidism    Generalized anxiety disorder    Atrophic vaginitis    Gastroesophageal reflux disease, unspecified whether esophagitis present    History of colonic polyps    Irritable bowel syndrome with diarrhea    Hives    Urticaria    Right knee pain, unspecified chronicity       Past Medical History:   Diagnosis Date    Allergic rhinitis due to other allergen     Benign essential hypertension 6/10/2020    Esophageal reflux     GENERALIZED ANXIETY  DIS 6/27/2007    Panic disorder without agoraphobia 1990    Trochanteric bursitis of right hip 8/2/2019    Unspecified hypothyroidism     Graves - s/p ablation      Problem (# of Occurrences) Relation (Name,Age of Onset)    Hypertension (1) Father (Garo Quevedo)    Thyroid Disease (2) Father (Garo Quevedo): Hashimotos, Son (Kalpesh Cruz)    Hyperlipidemia (1) Mother (Aniya Epstein)    Sjogren's (2) Paternal Grandmother, Paternal Aunt    Neuropathy (1) Mother (Aniya Epstein)          Past Surgical History:   Procedure Laterality Date    BIOPSY  1999    Breast biopsy    COLONOSCOPY  7/26/2011    Procedure:COMBINED COLONOSCOPY, REMOVE TUMOR/POLYP/LESION BY SNARE; single polyp removal; Surgeon:YUE LIMON; Location:[facility]    COLONOSCOPY Left 6/24/2016    Procedure: COMBINED COLONOSCOPY, SINGLE OR MULTIPLE BIOPSY/POLYPECTOMY BY BIOPSY;  Surgeon: Joseph Keyes MD;  Location: [facility] OR    COLONOSCOPY WITH CO2 INSUFFLATION N/A 6/24/2016    Procedure: COLONOSCOPY WITH CO2 INSUFFLATION;  Surgeon: Joseph Keyes MD;  Location: [facility] OR    ESOPHAGOSCOPY, GASTROSCOPY, DUODENOSCOPY (EGD), COMBINED N/A 5/8/2017    Procedure: COMBINED ESOPHAGOSCOPY, GASTROSCOPY, DUODENOSCOPY (EGD), BIOPSY SINGLE OR MULTIPLE;  ESOPHAGOSCOPY, GASTROSCOPY, DUODENOSCOPY (EGD) wiith multiple biopsies;  Surgeon: Chavez Land MD;  Location:  GI    EYE SURGERY  6/2/20    Cataract Surgery both eyes    GYN SURGERY  2005    Ablation of uterus.    HC HYSTEROSCOPY DIAGOSTIC (SEPARATE PROC)  8/10/2004    Hysteroscopy, D&C, Endometrial ablation    PELVIS LAPAROSCOPY,DX  1996    Pelvic pain - adhesions and mild endometriosis    US BREAST CLIP PLACEMENT W BIOPSY LEFT       Social History     Socioeconomic History    Marital status:      Spouse name: Barrera    Number of children: 2    Years of education: 15    Highest education level: None   Occupational History    Occupation:      Employer:     Tobacco Use    Smoking status: Never    Smokeless tobacco: Never    Tobacco comments:     no smokers in household   Vaping Use    Vaping status: Never Used   Substance and Sexual Activity    Alcohol use: No    Drug use: No    Sexual activity: Not Currently     Partners: Male     Birth control/protection: None     Comment: vasectomy   Other Topics Concern     Service No    Blood Transfusions No    Caffeine Concern No     Comment:  no pop, 1 cup coffee every am    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    Hobby Hazards No    Sleep Concern No    Stress Concern No    Weight Concern No    Special Diet No    Back Care Yes    Exercise Yes     Comment: 3-4 times a week    Bike Helmet Yes     Comment: biking     Seat Belt Yes    Self-Exams Yes     Comment: knows BSE, periodically, mammogram done 9/2/09    Parent/sibling w/ CABG, MI or angioplasty before 65F 55M? No   Social History Narrative    July 22, 2025        ENVIRONMENTAL HISTORY: [place] The home is heated with a forced air. They does have central air conditioning. The patient's bedroom is furnished with carpeting in bedroom and fabric window coverings.  Pets inside the house include 1 cat(s). There is no history of cockroach or mice infestation. There is/are 0 smokers in the house.  The house does have a damp basement.      Social Drivers of Health     Financial Resource Strain: Low Risk  (1/26/2025)    Financial Resource Strain     Within the past 12 months, have you or your family members you live with been unable to get utilities (heat, electricity) when it was really needed?: No   Food Insecurity: Low Risk  (1/26/2025)    Food Insecurity     Within the past 12 months, did you worry that your food would run out before you got money to buy more?: No     Within the past 12 months, did the food you bought just not last and you didn t have money to get more?: No   Transportation  Needs: Low Risk  (1/26/2025)    Transportation Needs     Within the past 12 months, has lack of transportation kept you from medical appointments, getting your medicines, non-medical meetings or appointments, work, or from getting things that you need?: No   Physical Activity: Insufficiently Active (1/26/2025)    Exercise Vital Sign     Days of Exercise per Week: 3 days     Minutes of Exercise per Session: 30 min   Stress: No Stress Concern Present (1/26/2025)    [employer] [place] [employer] - Occupational Stress Questionnaire     Feeling of Stress : Not at all   Social Connections: Unknown (1/26/2025)    Social Connection and Isolation Panel [NHANES]     Frequency of Social Gatherings with Friends and Family: Three times a week   Interpersonal Safety: Low Risk  (1/28/2025)    Interpersonal Safety     Do you feel physically and emotionally safe where you currently live?: Yes     Within the past 12 months, have you been hit, slapped, kicked or otherwise physically hurt by someone?: No     Within the past 12 months, have you been humiliated or emotionally abused in other ways by your partner or ex-partner?: No   Housing Stability: Low Risk  (1/26/2025)    Housing Stability     Do you have housing? : Yes     Are you worried about losing your housing?: No             Current Outpatient Medications:     DULoxetine (CYMBALTA) 30 MG capsule, Take 1 capsule (30 mg) by mouth daily., Disp: 90 capsule, Rfl: 3    EPINEPHrine (ANY BX GENERIC EQUIV) 0.3 MG/0.3ML injection 2-pack, Inject 0.3 mLs (0.3 mg) into the muscle as needed for anaphylaxis. May repeat one time in 5-15 minutes if response to initial dose is inadequate., Disp: 2 each, Rfl: 2    famotidine (PEPCID) 20 MG tablet, Take 1 tablet (20 mg) by mouth 2 times daily., Disp: 180 tablet, Rfl: 0    fexofenadine (ALLEGRA) 180 MG tablet, Take 1 tablet (180 mg) by mouth at bedtime., Disp: 90 tablet, Rfl: 0    fish oil-omega-3 fatty acids 1000 MG capsule, Take 2 g  by mouth daily., Disp: , Rfl:     levothyroxine (SYNTHROID/LEVOTHROID) 112 MCG tablet, Take 1 tablet (112 mcg) by mouth daily., Disp: 90 tablet, Rfl: 3    omeprazole (PRILOSEC) 40 MG DR capsule, Take 1 capsule (40 mg) by mouth daily., Disp: 90 capsule, Rfl: 1    vitamin B complex with vitamin C (VITAMIN  B COMPLEX) tablet, Take 1 tablet by mouth daily., Disp: , Rfl:     hydrOXYzine HCl (ATARAX) 25 MG tablet, Take 1 tablet (25 mg) by mouth every 8 hours as needed for itching or other (hives). (Patient not taking: Reported on 7/22/2025), Disp: 90 tablet, Rfl: 1    Current Facility-Administered Medications:     omalizumab (XOLAIR) injection 300 mg, 300 mg, Subcutaneous, Q28 Days,   Immunization History   Administered Date(s) Administered    COVID-19 Monovalent 18+ (Moderna) 02/04/2021, 04/21/2021, 01/03/2022    HEPA 07/07/1999, 02/21/2000    HepB 07/07/1999, 08/06/1999, 02/11/2000    Influenza (IIV3) PF 10/20/1997, 10/16/1998, 11/09/1999, 12/02/2002, 10/05/2010, 10/04/2011, 10/02/2012, 10/01/2013, 10/08/2014, 11/01/2015    Influenza (prior to 2024) 10/27/2009    Influenza Vaccine 18-64 (Flublok) 12/19/2019    Influenza Vaccine >6 months,quad, PF 09/30/2014, 10/10/2016, 09/27/2017, 10/11/2022, 10/26/2023    Influenza Vaccine, 6+MO IM (QUADRIVALENT W/PRESERVATIVES) 09/29/2015, 09/27/2017, 09/25/2018, 10/14/2021    Influenza,INJ,MDCK,PF,Quad >6mo(Flucelvax) 10/16/2020    TD,PF 7+ (Tenivac) 04/17/1996, 01/17/2017    TDAP (Adacel,Boostrix) 08/08/2006    Typhoid IM 08/01/1999     Allergies   Allergen Reactions    Amoxicillin      tightness in throat    Cephalosporins      ceftin    Clindamycin Base      Nausea, lightheadeness    Erythromycin      Welts    Macrolides And Ketolides     Sulfa Antibiotics Swelling     bactrim    Sulfamethoxazole     Trimethoprim      OBJECTIVE:                                                                 /80   Pulse 91   LMP 07/18/2005   SpO2 96%         Physical Exam  Vitals and  nursing note reviewed.   Constitutional:       General: Laura is not in acute distress.     Appearance: Laura is not ill-appearing, toxic-appearing or diaphoretic.   HENT:      Head: Normocephalic and atraumatic.      Right Ear: Tympanic membrane, ear canal and external ear normal.      Left Ear: Tympanic membrane, ear canal and external ear normal.      Nose: No mucosal edema, congestion or rhinorrhea.      Right Turbinates: Not enlarged, swollen or pale.      Left Turbinates: Not enlarged, swollen or pale.      Mouth/Throat:      Lips: Pink.      Mouth: Mucous membranes are moist.      Pharynx: Oropharynx is clear. No pharyngeal swelling, oropharyngeal exudate, posterior oropharyngeal erythema or uvula swelling.   Eyes:      General:         Right eye: No discharge.         Left eye: No discharge.      Conjunctiva/sclera: Conjunctivae normal.   Pulmonary:      Effort: Pulmonary effort is normal. No respiratory distress.      Breath sounds: Normal breath sounds and air entry. No stridor, decreased air movement or transmitted upper airway sounds. No decreased breath sounds, wheezing, rhonchi or rales.   Skin:     General: Skin is warm.      Comments: Multiple urticarial lesions, ranging from dime- to quarter-sized, on upper extremities and back.   Neurological:      Mental Status: Laura is alert and oriented to person, place, and time.   Psychiatric:         Mood and Affect: Mood normal.         Behavior: Behavior normal.          ASSESSMENT/PLAN:      Chronic idiopathic urticaria  Symptoms are suboptimally controlled.  Continue fexofenadine 360 mg by mouth twice daily, famotidine 20 g by mouth twice daily, and initiate omalizumab 300 mg every 28 days.  For GERD, continue famotidine 20 BY mouth twice daily, and increase omeprazole to 40 mg by mouth twice daily as needed.      - Adult Allergy Clinic Follow-Up Order     Follow-up in 3 months or sooner if needed.    Thank you for allowing us to participate in the care of  this patient. Please feel free to contact us if there are any questions or concerns about the patient.    Disclaimer: This note consists of symbols derived from keyboarding, dictation and/or voice recognition software. As a result, there may be errors in the script that have gone undetected. Please consider this when interpreting information found in this chart.    Consent was obtained from the patient to use an AI documentation tool in the creation of this note.     Paramjit Anderson MD, FAAAAI, FACAAI  Allergy, Asthma and Immunology     [facility]

## 2025-07-16 NOTE — PATIENT INSTRUCTIONS
Dr Anderson [phone]    All visits for food challenges, venom allergy testing, and medication/drug challenges MUST be scheduled through the allergy clinic nurse. Please send a [employer] message or call the allergy scheduling line and ask to speak with Dr Anderson's team for scheduling these appointments. Appointments for these visits that are made through the schedulers or via [employer] may be cancelled or rescheduled.    Allergy Shot Scheduling ([place]): [phone]    Pulmonary Function [phone]    Prescription Assistance  If you need assistance with your prescriptions (cost, coverage, etc) please contact: [place] [facility] [phone]

## 2025-07-22 ENCOUNTER — OFFICE VISIT (OUTPATIENT)
Dept: ALLERGY | Facility: OTHER | Age: 65
End: 2025-07-22
Attending: ALLERGY & IMMUNOLOGY
Payer: COMMERCIAL

## 2025-07-22 VITALS — HEART RATE: 91 BPM | SYSTOLIC BLOOD PRESSURE: 120 MMHG | OXYGEN SATURATION: 96 % | DIASTOLIC BLOOD PRESSURE: 80 MMHG

## 2025-07-22 DIAGNOSIS — L50.1 CHRONIC IDIOPATHIC URTICARIA: ICD-10-CM

## 2025-07-22 PROCEDURE — 3074F SYST BP LT 130 MM HG: CPT | Performed by: ALLERGY & IMMUNOLOGY

## 2025-07-22 PROCEDURE — 99213 OFFICE O/P EST LOW 20 MIN: CPT | Performed by: ALLERGY & IMMUNOLOGY

## 2025-07-22 PROCEDURE — 3079F DIAST BP 80-89 MM HG: CPT | Performed by: ALLERGY & IMMUNOLOGY

## 2025-07-22 NOTE — LETTER
7/22/2025      Bel Cruz  [address] [facility] [address]      Dear Colleague,    Thank you for referring your patient, Bel Cruz, to the [facility]. Please see a copy of my visit note below.    SUBJECTIVE:                                                               Bel Cruz presents today to our Allergy Clinic at [facility] for a follow up visit. Laura is a 64 year old adult with chronic spontaneous urticaria.  History of urticaria that started in March 2025. In April 2025, CBC with differential within normal limits, comprehensive metabolic panel within normal limits, thyroid antibody levels within normal limits, serum tryptase level within normal limits, negative serum IgE for alpha gal. Mildly elevated Urticaria induced basophil activation index.     When last seen in June 2025, the patient s symptoms were not well-controlled despite taking fexofenadine 360 mg by mouth twice daily and famotidine 20 mg by mouth twice daily. At that time, it was agreed to initiate omalizumab; however, the patient did not start it, as they were [place] of [place] on multiple occasions over the past four weeks.    Since then, the patient experienced a significant flare with lip angioedema and required another course of prednisone. They continue to have hives, though the symptoms are milder than before.    The patient also reports persistent heartburn. Their current regimen includes omeprazole 40 mg by mouth once daily and famotidine 20 mg by mouth twice daily.    An upper endoscopy performed on July 1 showed a normal esophagus, with no findings suggestive of reflux disease or eosinophilic esophagitis.      Patient Active Problem List   Diagnosis     Hypothyroidism     Generalized anxiety disorder     Atrophic vaginitis     Gastroesophageal reflux disease, unspecified whether esophagitis present     History of colonic polyps     Irritable bowel syndrome with  diarrhea     Hives     Urticaria     Right knee pain, unspecified chronicity       Past Medical History:   Diagnosis Date     Allergic rhinitis due to other allergen      Benign essential hypertension 6/10/2020     Esophageal reflux      GENERALIZED ANXIETY DIS 6/27/2007     Panic disorder without agoraphobia 1990     Trochanteric bursitis of right hip 8/2/2019     Unspecified hypothyroidism     Graves - s/p ablation      Problem (# of Occurrences) Relation (Name,Age of Onset)    Hypertension (1) Father (Garo Quevedo)    Thyroid Disease (2) Father (Garo Quevedo): Hashimotos, Son (Kalpesh Cruz)    Hyperlipidemia (1) Mother (Aniya Epstein)    Sjogren's (2) Paternal Grandmother, Paternal Aunt    Neuropathy (1) Mother (Aniya Epstein)          Past Surgical History:   Procedure Laterality Date     BIOPSY  1999    Breast biopsy     COLONOSCOPY  7/26/2011    Procedure:COMBINED COLONOSCOPY, REMOVE TUMOR/POLYP/LESION BY SNARE; single polyp removal; Surgeon:YUE LIMON; Location: GI     COLONOSCOPY Left 6/24/2016    Procedure: COMBINED COLONOSCOPY, SINGLE OR MULTIPLE BIOPSY/POLYPECTOMY BY BIOPSY;  Surgeon: Joseph Keyes MD;  Location: [facility] OR     COLONOSCOPY WITH CO2 INSUFFLATION N/A 6/24/2016    Procedure: COLONOSCOPY WITH CO2 INSUFFLATION;  Surgeon: Joseph Keyes MD;  Location: [facility] OR     ESOPHAGOSCOPY, GASTROSCOPY, DUODENOSCOPY (EGD), COMBINED N/A 5/8/2017    Procedure: COMBINED ESOPHAGOSCOPY, GASTROSCOPY, DUODENOSCOPY (EGD), BIOPSY SINGLE OR MULTIPLE;  ESOPHAGOSCOPY, GASTROSCOPY, DUODENOSCOPY (EGD) wiith multiple biopsies;  Surgeon: Chavez Land MD;  Location:  GI     EYE SURGERY  6/2/20    Cataract Surgery both eyes     GYN SURGERY  2005    Ablation of uterus.     HC HYSTEROSCOPY DIAGOSTIC (SEPARATE PROC)  8/10/2004    Hysteroscopy, D&C, Endometrial ablation     PELVIS LAPAROSCOPY,DX  1996    Pelvic pain - adhesions and mild endometriosis     US BREAST CLIP PLACEMENT W BIOPSY  LEFT       Social History     Socioeconomic History     Marital status:      Spouse name: Barrera     Number of children: 2     Years of education: 15     Highest education level: None   Occupational History     Occupation:      Employer:    Tobacco Use     Smoking status: Never     Smokeless tobacco: Never     Tobacco comments:     no smokers in household   Vaping Use     Vaping status: Never Used   Substance and Sexual Activity     Alcohol use: No     Drug use: No     Sexual activity: Not Currently     Partners: Male     Birth control/protection: None     Comment: vasectomy   Other Topics Concern      Service No     Blood Transfusions No     Caffeine Concern No     Comment:  no pop, 1 cup coffee every am     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     Hobby Hazards No     Sleep Concern No     Stress Concern No     Weight Concern No     Special Diet No     Back Care Yes     Exercise Yes     Comment: 3-4 times a week     Bike Helmet Yes     Comment: biking      Seat Belt Yes     Self-Exams Yes     Comment: knows BSE, periodically, mammogram done 9/2/09     Parent/sibling w/ CABG, MI or angioplasty before 65F 55M? No   Social History Narrative    July 22, 2025        ENVIRONMENTAL HISTORY: [place] The home is heated with a forced air. They does have central air conditioning. The patient's bedroom is furnished with carpeting in bedroom and fabric window coverings.  Pets inside the house include 1 cat(s). There is no history of cockroach or mice infestation. There is/are 0 smokers in the house.  The house does have a damp basement.      Social Drivers of Health     Financial Resource Strain: Low Risk  (1/26/2025)    Financial Resource Strain      Within the past 12 months, have you or your family members you live with been unable to get utilities (heat, electricity) when it was really needed?: No   Food  Insecurity: Low Risk  (1/26/2025)    Food Insecurity      Within the past 12 months, did you worry that your food would run out before you got money to buy more?: No      Within the past 12 months, did the food you bought just not last and you didn t have money to get more?: No   Transportation Needs: Low Risk  (1/26/2025)    Transportation Needs      Within the past 12 months, has lack of transportation kept you from medical appointments, getting your medicines, non-medical meetings or appointments, work, or from getting things that you need?: No   Physical Activity: Insufficiently Active (1/26/2025)    Exercise Vital Sign      Days of Exercise per Week: 3 days      Minutes of Exercise per Session: 30 min   Stress: No Stress Concern Present (1/26/2025)    [employer] - Occupational Stress Questionnaire      Feeling of Stress : Not at all   Social Connections: Unknown (1/26/2025)    Social Connection and Isolation Panel [NHANES]      Frequency of Social Gatherings with Friends and Family: Three times a week   Interpersonal Safety: Low Risk  (1/28/2025)    Interpersonal Safety      Do you feel physically and emotionally safe where you currently live?: Yes      Within the past 12 months, have you been hit, slapped, kicked or otherwise physically hurt by someone?: No      Within the past 12 months, have you been humiliated or emotionally abused in other ways by your partner or ex-partner?: No   Housing Stability: Low Risk  (1/26/2025)    Housing Stability      Do you have housing? : Yes      Are you worried about losing your housing?: No             Current Outpatient Medications:      DULoxetine (CYMBALTA) 30 MG capsule, Take 1 capsule (30 mg) by mouth daily., Disp: 90 capsule, Rfl: 3     EPINEPHrine (ANY BX GENERIC EQUIV) 0.3 MG/0.3ML injection 2-pack, Inject 0.3 mLs (0.3 mg) into the muscle as needed for anaphylaxis. May repeat one time in 5-15 minutes if response to initial dose is  inadequate., Disp: 2 each, Rfl: 2     famotidine (PEPCID) 20 MG tablet, Take 1 tablet (20 mg) by mouth 2 times daily., Disp: 180 tablet, Rfl: 0     fexofenadine (ALLEGRA) 180 MG tablet, Take 1 tablet (180 mg) by mouth at bedtime., Disp: 90 tablet, Rfl: 0     fish oil-omega-3 fatty acids 1000 MG capsule, Take 2 g by mouth daily., Disp: , Rfl:      levothyroxine (SYNTHROID/LEVOTHROID) 112 MCG tablet, Take 1 tablet (112 mcg) by mouth daily., Disp: 90 tablet, Rfl: 3     omeprazole (PRILOSEC) 40 MG DR capsule, Take 1 capsule (40 mg) by mouth daily., Disp: 90 capsule, Rfl: 1     vitamin B complex with vitamin C (VITAMIN  B COMPLEX) tablet, Take 1 tablet by mouth daily., Disp: , Rfl:      hydrOXYzine HCl (ATARAX) 25 MG tablet, Take 1 tablet (25 mg) by mouth every 8 hours as needed for itching or other (hives). (Patient not taking: Reported on 7/22/2025), Disp: 90 tablet, Rfl: 1    Current Facility-Administered Medications:      omalizumab (XOLAIR) injection 300 mg, 300 mg, Subcutaneous, Q28 Days,   Immunization History   Administered Date(s) Administered     COVID-19 Monovalent 18+ (Moderna) 02/04/2021, 04/21/2021, 01/03/2022     HEPA 07/07/1999, 02/21/2000     HepB 07/07/1999, 08/06/1999, 02/11/2000     Influenza (IIV3) PF 10/20/1997, 10/16/1998, 11/09/1999, 12/02/2002, 10/05/2010, 10/04/2011, 10/02/2012, 10/01/2013, 10/08/2014, 11/01/2015     Influenza (prior to 2024) 10/27/2009     Influenza Vaccine 18-64 (Flublok) 12/19/2019     Influenza Vaccine >6 months,quad, PF 09/30/2014, 10/10/2016, 09/27/2017, 10/11/2022, 10/26/2023     Influenza Vaccine, 6+MO IM (QUADRIVALENT W/PRESERVATIVES) 09/29/2015, 09/27/2017, 09/25/2018, 10/14/2021     Influenza,INJ,MDCK,PF,Quad >6mo(Flucelvax) 10/16/2020     TD,PF 7+ (Tenivac) 04/17/1996, 01/17/2017     TDAP (Adacel,Boostrix) 08/08/2006     Typhoid IM 08/01/1999     Allergies   Allergen Reactions     Amoxicillin      tightness in throat     Cephalosporins      ceftin     Clindamycin  Base      Nausea, lightheadeness     Erythromycin      Welts     Macrolides And Ketolides      Sulfa Antibiotics Swelling     bactrim     Sulfamethoxazole      Trimethoprim      OBJECTIVE:                                                                 /80   Pulse 91   LMP 07/18/2005   SpO2 96%         Physical Exam  Vitals and nursing note reviewed.   Constitutional:       General: Laura is not in acute distress.     Appearance: Laura is not ill-appearing, toxic-appearing or diaphoretic.   HENT:      Head: Normocephalic and atraumatic.      Right Ear: Tympanic membrane, ear canal and external ear normal.      Left Ear: Tympanic membrane, ear canal and external ear normal.      Nose: No mucosal edema, congestion or rhinorrhea.      Right Turbinates: Not enlarged, swollen or pale.      Left Turbinates: Not enlarged, swollen or pale.      Mouth/Throat:      Lips: Pink.      Mouth: Mucous membranes are moist.      Pharynx: Oropharynx is clear. No pharyngeal swelling, oropharyngeal exudate, posterior oropharyngeal erythema or uvula swelling.   Eyes:      General:         Right eye: No discharge.         Left eye: No discharge.      Conjunctiva/sclera: Conjunctivae normal.   Pulmonary:      Effort: Pulmonary effort is normal. No respiratory distress.      Breath sounds: Normal breath sounds and air entry. No stridor, decreased air movement or transmitted upper airway sounds. No decreased breath sounds, wheezing, rhonchi or rales.   Skin:     General: Skin is warm.      Comments: Multiple urticarial lesions, ranging from dime- to quarter-sized, on upper extremities and back.   Neurological:      Mental Status: Laura is alert and oriented to person, place, and time.   Psychiatric:         Mood and Affect: Mood normal.         Behavior: Behavior normal.          ASSESSMENT/PLAN:      Chronic idiopathic urticaria  Symptoms are suboptimally controlled.  Continue fexofenadine 360 mg by mouth twice daily, famotidine 20 g by  mouth twice daily, and initiate omalizumab 300 mg every 28 days.  For GERD, continue famotidine 20 BY mouth twice daily, and increase omeprazole to 40 mg by mouth twice daily as needed.      - Adult Allergy Clinic Follow-Up Order     Follow-up in 3 months or sooner if needed.    Thank you for allowing us to participate in the care of this patient. Please feel free to contact us if there are any questions or concerns about the patient.    Disclaimer: This note consists of symbols derived from keyboarding, dictation and/or voice recognition software. As a result, there may be errors in the script that have gone undetected. Please consider this when interpreting information found in this chart.    Consent was obtained from the patient to use an AI documentation tool in the creation of this note.     Paramjit Anderson MD, FAAAAI, FACAAI  Allergy, Asthma and Immunology     [facility]      Again, thank you for allowing me to participate in the care of your patient.        Sincerely,        Paramjit Anderson MD    Electronically signed

## 2025-07-23 ENCOUNTER — ALLIED HEALTH/NURSE VISIT (OUTPATIENT)
Dept: ALLERGY | Facility: OTHER | Age: 65
End: 2025-07-23
Payer: COMMERCIAL

## 2025-07-23 DIAGNOSIS — L50.1 CHRONIC IDIOPATHIC URTICARIA: Primary | ICD-10-CM

## 2025-07-23 PROCEDURE — 96372 THER/PROPH/DIAG INJ SC/IM: CPT | Performed by: ALLERGY & IMMUNOLOGY

## 2025-07-23 PROCEDURE — 99207 PR NO CHARGE LOS: CPT

## 2025-07-23 NOTE — PROGRESS NOTES
Clinic Administered Medication Documentation      Injectable Medication Documentation      Patient was given 300 mg Xolair. Prior to medication administration, verified patient's identity using patient s name and date of birth. Please see MAR and medication order for additional information. Patient instructed to remain in clinic for 30 minutes, report any adverse reaction to staff immediately, and must check in with staff prior to discharge from clinic.    Was this medication supplied by the patient? No     
eBl Cruz presents to clinic today at the request of Paramjit Anderson MD  (ordering provider) for Xolair (omalizumab) injection(s).       This service provided today was under the care of Paramjit Anderson MD ; the supervising provider of the day; who was available if needed.      Patient presented after waiting 2 hours with no reaction to  injections. Discharged from clinic.    Latrice Ochoa RN    
home

## 2025-07-27 NOTE — PROGRESS NOTES
[facility]           Referring provider:Jerardo Conteh MD     HPI:   Bel Cruz is a 64 year old  adult with PMH significant for    Allergies, GERD.  Mild hyperlipidemia  Mild coronary calcification noted on CT chest 6/1/2025    Patient is being seen for nonspecific EKG changes noted on EKG when she presented to the [facility] ER on 6/1/2025.   Patient had some hives and then developed epigastric and midsternal chest pain that radiated to her jaw.Patient presented to [facility] emergency room 6/1/2025 for chest discomfort.  Workup was overall unremarkable.  CT chest unremarkable except mild coronary calcifications but no PE.  Troponin normal.  Today patient reports  - Chronic urticaria since March 2025, with hives covering the body, ongoing daily itching from head to toe  - End of May 2025: Severe hives led to increase in antihistamines (4 Allegra daily, 2 Pepcid daily, Vistaril at night)  - Two days after increasing antihistamines, developed severe chest pain radiating to jaw, band-like tightness, burning, shortness of breath, and inability to sleep; episode lasted all night while trying to go to bed  - Emergency room visit for chest pain; received antacids and lidocaine, symptoms improved; told heart was fine, diagnosed with heartburn  - Since that episode, daily recurrent chest discomfort and pain, especially in right rib area radiating to back, with intermittent episodes of burning rising to chin and jaw; pain can last hours (e.g., 3 hours last weekend)  - Symptoms worse with lying on back or side, sometimes with bending over or after eating; attempts to modify eating patterns and sleep position have not eliminated symptoms  - Omeprazole 40 mg daily started after ER visit; ongoing use of antacids with incomplete relief  - June 2025: Two courses of prednisone for severe hives, with  temporary improvement in urticaria but persistent chest discomfort  - Upper endoscopy performed July 2, 2025, while on prednisone and asymptomatic for heartburn; no abnormalities found.  Patient was seen at [facility].  - No prior history of chronic hives before March 2025, except for one episode after antibiotic use  - Reports mild burning in lungs with exertion over the years, not new  - Gained weight recently  - Blood pressure reported as usually 125/80 with primary care, but higher at other medical visits  Cardiovascular risk factors:  Age 64 years  Mild elevated cholesterol  Prior cardiac testing:  Echocardiogram in 2020 normal.    Medications, personal, family, and social history reviewed with patient and revised.    PAST MEDICAL HISTORY:  Past Medical History:   Diagnosis Date    Allergic rhinitis due to other allergen     Benign essential hypertension 6/10/2020    Esophageal reflux     GENERALIZED ANXIETY DIS 6/27/2007    Panic disorder without agoraphobia 1990    Trochanteric bursitis of right hip 8/2/2019    Unspecified hypothyroidism     Graves - s/p ablation       CURRENT MEDICATIONS:  Current Outpatient Medications   Medication Sig Dispense Refill    DULoxetine (CYMBALTA) 30 MG capsule Take 1 capsule (30 mg) by mouth daily. 90 capsule 3    EPINEPHrine (ANY BX GENERIC EQUIV) 0.3 MG/0.3ML injection 2-pack Inject 0.3 mLs (0.3 mg) into the muscle as needed for anaphylaxis. May repeat one time in 5-15 minutes if response to initial dose is inadequate. 2 each 2    famotidine (PEPCID) 20 MG tablet Take 1 tablet (20 mg) by mouth 2 times daily. 180 tablet 0    fexofenadine (ALLEGRA) 180 MG tablet Take 1 tablet (180 mg) by mouth at bedtime. 90 tablet 0    fish oil-omega-3 fatty acids 1000 MG capsule Take 2 g by mouth daily.      hydrOXYzine HCl (ATARAX) 25 MG tablet Take 1 tablet (25 mg) by mouth every 8 hours as needed for itching or other (hives). (Patient not taking: Reported on 7/22/2025) 90 tablet 1     levothyroxine (SYNTHROID/LEVOTHROID) 112 MCG tablet Take 1 tablet (112 mcg) by mouth daily. 90 tablet 3    omeprazole (PRILOSEC) 40 MG DR capsule Take 1 capsule (40 mg) by mouth daily. 90 capsule 1    vitamin B complex with vitamin C (VITAMIN  B COMPLEX) tablet Take 1 tablet by mouth daily.         PAST SURGICAL HISTORY:  Past Surgical History:   Procedure Laterality Date    BIOPSY  1999    Breast biopsy    COLONOSCOPY  7/26/2011    Procedure:COMBINED COLONOSCOPY, REMOVE TUMOR/POLYP/LESION BY SNARE; single polyp removal; Surgeon:YUE LIMON; Location:[facility]    COLONOSCOPY Left 6/24/2016    Procedure: COMBINED COLONOSCOPY, SINGLE OR MULTIPLE BIOPSY/POLYPECTOMY BY BIOPSY;  Surgeon: Joseph Keyes MD;  Location: [facility] OR    COLONOSCOPY WITH CO2 INSUFFLATION N/A 6/24/2016    Procedure: COLONOSCOPY WITH CO2 INSUFFLATION;  Surgeon: Joseph Keyes MD;  Location: [facility] OR    ESOPHAGOSCOPY, GASTROSCOPY, DUODENOSCOPY (EGD), COMBINED N/A 5/8/2017    Procedure: COMBINED ESOPHAGOSCOPY, GASTROSCOPY, DUODENOSCOPY (EGD), BIOPSY SINGLE OR MULTIPLE;  ESOPHAGOSCOPY, GASTROSCOPY, DUODENOSCOPY (EGD) wiith multiple biopsies;  Surgeon: Chavez Land MD;  Location:  GI    EYE SURGERY  6/2/20    Cataract Surgery both eyes    GYN SURGERY  2005    Ablation of uterus.    HC HYSTEROSCOPY DIAGOSTIC (SEPARATE PROC)  8/10/2004    Hysteroscopy, D&C, Endometrial ablation    PELVIS LAPAROSCOPY,DX  1996    Pelvic pain - adhesions and mild endometriosis    US BREAST CLIP PLACEMENT W BIOPSY LEFT         ALLERGIES:     Allergies   Allergen Reactions    Amoxicillin      tightness in throat    Cephalosporins      ceftin    Clindamycin Base      Nausea, lightheadeness    Erythromycin      Welts    Macrolides And Ketolides     Sulfa Antibiotics Swelling     bactrim    Sulfamethoxazole     Trimethoprim        FAMILY HISTORY:  Family History   Problem Relation Age of Onset    Hyperlipidemia Mother     Neuropathy Mother     Thyroid  Disease Father         Hashimotos    Hypertension Father     Sjogren's Paternal Grandmother     Thyroid Disease Son     Sjogren's Paternal Aunt          SOCIAL HISTORY:  Social History     Tobacco Use    Smoking status: Never    Smokeless tobacco: Never    Tobacco comments:     no smokers in household   Vaping Use    Vaping status: Never Used   Substance Use Topics    Alcohol use: No    Drug use: No       ROS:   Constitutional: No fever, chills, or sweats. Weight stable.   Cardiovascular: As per HPI.       Exam:  BP (!) 142/91 (BP Location: Right arm, Patient Position: Chair, Cuff Size: Adult Regular)   Pulse 80   Wt 87.1 kg (192 lb)   LMP 07/18/2005   SpO2 98%   BMI 28.35 kg/m    GENERAL APPEARANCE: alert and no distress  HEENT: no icterus, no central cyanosis  LYMPH/NECK: no adenopathy, no asymmetry, JVP not elevated.  RESPIRATORY: lungs clear to auscultation - no rales, rhonchi or wheezes, no use of accessory muscles, no retractions, respirations are unlabored, normal respiratory rate  CARDIOVASCULAR: regular rhythm, normal S1, S2, no S3 or S4 and no murmur, click or rub, precordium quiet with normal PMI.  GI: soft, non tender  EXTREMITIES: no edema  NEURO: alert, normal speech,and affect  SKIN: no ecchymoses, no rashes     I have reviewed the labs and personally reviewed the imaging below and made my comment in the assessment and plan.    Labs:  CBC RESULTS:   Lab Results   Component Value Date    WBC 4.5 06/01/2025    WBC 5.4 06/14/2018    RBC 4.89 06/01/2025    RBC 4.83 06/14/2018    HGB 14.3 06/01/2025    HGB 14.3 06/14/2018    HCT 41.7 06/01/2025    HCT 42.6 06/14/2018    MCV 85 06/01/2025    MCV 88 06/14/2018    MCH 29.2 06/01/2025    MCH 29.6 06/14/2018    MCHC 34.3 06/01/2025    MCHC 33.6 06/14/2018    RDW 12.4 06/01/2025    RDW 12.7 06/14/2018     06/01/2025     06/14/2018       BMP RESULTS:  Lab Results   Component Value Date     06/01/2025     05/15/2020    POTASSIUM 4.1  06/01/2025    POTASSIUM 4.3 01/03/2023    POTASSIUM 4.2 05/15/2020    CHLORIDE 99 06/01/2025    CHLORIDE 107 01/03/2023    CHLORIDE 104 05/15/2020    CO2 24 06/01/2025    CO2 30 01/03/2023    CO2 31 05/15/2020    ANIONGAP 13 06/01/2025    ANIONGAP 3 01/03/2023    ANIONGAP 4 05/15/2020     (H) 06/01/2025     (H) 01/03/2023    GLC 82 05/15/2020    BUN 15.9 06/01/2025    BUN 14 01/03/2023    BUN 12 05/15/2020    CR 0.90 06/01/2025    CR 0.88 05/15/2020    GFRESTIMATED 71 06/01/2025    GFRESTIMATED 71 05/15/2020    GFRESTBLACK 82 05/15/2020    MELANIE 9.9 06/01/2025    MELANIE 9.2 05/15/2020      Echocardiogram 2020:  The left ventricle is normal in size.  Left ventricular systolic function is normal.  The visual ejection fraction is estimated at 55-60%.  Normal left ventricular wall motion  The right ventricle is normal in structure, function and size.  No hemodynamically significant valvular abnormalities on 2D or color flow  imaging. There is no comparison study available.    CT chest 6/1/2025  No PE  Coronary calcification mild    Assessment and Plan:   Patient is being seen today for atypical chest discomfort since June of this year (since she started high dose anti-allergic medications for hives) which has not improved since.  Unlikely cardiac but cannot completely rule out.  Not exertional.    # Mild coronary calcifications  - Explained to patient what mild coronary calcifications meant.  Recommended statins given mildly elevated LDL but the patient has hives all over her body.  Will defer for now until hives settle.    # Nonspecific EKG changes  # Chest discomfort lately since June of this year.  GI ruled out.  -Not exertional.  Given cardiovascular risk factors I recommended exercise stress echocardiogram.  If patient's symptoms do not resolve in few months despite normal stress test we will consider CT coronary angiogram.      # Mild hyperlipidemia  - Will discuss statins in 6 months when she comes back  to recheck.    No medication changes today.  Return to clinic 6 months or earlier as needed.    Roque Motista documentation tool used in the creation of this note.     Total time spent today for this visit is 62 minutes including precharting, face-to-face clinic visit, review of labs/imaging and medical documentation.    Jennie HOANG MD  [facility] Division of Cardiology    Securely message with Eli

## 2025-07-28 ENCOUNTER — OFFICE VISIT (OUTPATIENT)
Dept: CARDIOLOGY | Facility: CLINIC | Age: 65
End: 2025-07-28
Payer: COMMERCIAL

## 2025-07-28 VITALS
DIASTOLIC BLOOD PRESSURE: 86 MMHG | HEART RATE: 74 BPM | OXYGEN SATURATION: 99 % | SYSTOLIC BLOOD PRESSURE: 130 MMHG | BODY MASS INDEX: 28.35 KG/M2 | WEIGHT: 192 LBS

## 2025-07-28 DIAGNOSIS — R07.89 ATYPICAL CHEST PAIN: Primary | ICD-10-CM

## 2025-07-28 DIAGNOSIS — R94.31 NONSPECIFIC ABNORMAL ELECTROCARDIOGRAM (ECG) (EKG): ICD-10-CM

## 2025-07-28 PROCEDURE — 3075F SYST BP GE 130 - 139MM HG: CPT | Performed by: INTERNAL MEDICINE

## 2025-07-28 PROCEDURE — 99205 OFFICE O/P NEW HI 60 MIN: CPT | Performed by: INTERNAL MEDICINE

## 2025-07-28 PROCEDURE — 3078F DIAST BP <80 MM HG: CPT | Performed by: INTERNAL MEDICINE

## 2025-07-28 NOTE — PATIENT INSTRUCTIONS
Thank you for coming to the [facility] @ [facility]; please note the following instructions:    1. Recheck Blood Pressure today    2. Exercise Stress Echocardiogram    3. Follow up in 6 months with Dr. James    4. If you have any chest pain, please update clinic at # [phone]        If you have any questions regarding your visit please contact your care team:     Cardiology  Telephone Number   Karina WORTHY., RN  Margaret RAMOS, RN  Tawny FRAZIER, RN  Niukra ZARATE, RMHUMAIRA HODGES, YOLETTE BARNHART, Clinic Facilitator  Annia RAMOS, Clinic Facilitator [phone] (option 1)   For scheduling appts:     [phone] (select option 1)       For the Device Clinic (Pacemakers and ICD's)  RN's :  Sindi Yung   During business hours: [phone]    *After business hours:  [phone] (select option 4)      Normal test result notifications will be released via M87 or mailed within 7 business days.  All other test results, will be communicated via telephone once reviewed by your cardiologist.    If you need a medication refill please contact your pharmacy.  Please allow 3 business days for your refill to be completed.    As always, thank you for trusting us with your health care needs!    Her/She

## 2025-07-28 NOTE — LETTER
7/28/2025      RE: Bel Cruz  [address]  [facility] [address]       Dear Colleague,    Thank you for the opportunity to participate in the care of your patient, Bel Cruz, at the [facility]. Please see a copy of my visit note below.                                                                                                   General Cardiology [facility]           Referring provider:Jerardo Conteh MD     HPI:   Bel Cruz is a 64 year old  adult with PMH significant for    Allergies, GERD.  Mild hyperlipidemia  Mild coronary calcification noted on CT chest 6/1/2025    Patient is being seen for nonspecific EKG changes noted on EKG when she presented to the [facility] ER on 6/1/2025.   Patient had some hives and then developed epigastric and midsternal chest pain that radiated to her jaw.Patient presented to [facility] emergency room 6/1/2025 for chest discomfort.  Workup was overall unremarkable.  CT chest unremarkable except mild coronary calcifications but no PE.  Troponin normal.  Today patient reports  - Chronic urticaria since March 2025, with hives covering the body, ongoing daily itching from head to toe  - End of May 2025: Severe hives led to increase in antihistamines (4 Allegra daily, 2 Pepcid daily, Vistaril at night)  - Two days after increasing antihistamines, developed severe chest pain radiating to jaw, band-like tightness, burning, shortness of breath, and inability to sleep; episode lasted all night while trying to go to bed  - Emergency room visit for chest pain; received antacids and lidocaine, symptoms improved; told heart was fine, diagnosed with heartburn  - Since that episode, daily recurrent chest discomfort and pain, especially in right rib area radiating to back, with intermittent episodes of burning rising to chin and jaw; pain can last hours (e.g., 3 hours  last weekend)  - Symptoms worse with lying on back or side, sometimes with bending over or after eating; attempts to modify eating patterns and sleep position have not eliminated symptoms  - Omeprazole 40 mg daily started after ER visit; ongoing use of antacids with incomplete relief  - June 2025: Two courses of prednisone for severe hives, with temporary improvement in urticaria but persistent chest discomfort  - Upper endoscopy performed July 2, 2025, while on prednisone and asymptomatic for heartburn; no abnormalities found.  Patient was seen at [facility].  - No prior history of chronic hives before March 2025, except for one episode after antibiotic use  - Reports mild burning in lungs with exertion over the years, not new  - Gained weight recently  - Blood pressure reported as usually 125/80 with primary care, but higher at other medical visits  Cardiovascular risk factors:  Age 64 years  Mild elevated cholesterol  Prior cardiac testing:  Echocardiogram in 2020 normal.    Medications, personal, family, and social history reviewed with patient and revised.    PAST MEDICAL HISTORY:  Past Medical History:   Diagnosis Date     Allergic rhinitis due to other allergen      Benign essential hypertension 6/10/2020     Esophageal reflux      GENERALIZED ANXIETY DIS 6/27/2007     Panic disorder without agoraphobia 1990     Trochanteric bursitis of right hip 8/2/2019     Unspecified hypothyroidism     [facility] - s/p ablation       CURRENT MEDICATIONS:  Current Outpatient Medications   Medication Sig Dispense Refill     DULoxetine (CYMBALTA) 30 MG capsule Take 1 capsule (30 mg) by mouth daily. 90 capsule 3     EPINEPHrine (ANY BX GENERIC EQUIV) 0.3 MG/0.3ML injection 2-pack Inject 0.3 mLs (0.3 mg) into the muscle as needed for anaphylaxis. May repeat one time in 5-15 minutes if response to initial dose is inadequate. 2 each 2     famotidine (PEPCID) 20 MG tablet Take 1 tablet (20 mg) by mouth 2 times daily. 180 tablet 0      fexofenadine (ALLEGRA) 180 MG tablet Take 1 tablet (180 mg) by mouth at bedtime. 90 tablet 0     fish oil-omega-3 fatty acids 1000 MG capsule Take 2 g by mouth daily.       hydrOXYzine HCl (ATARAX) 25 MG tablet Take 1 tablet (25 mg) by mouth every 8 hours as needed for itching or other (hives). (Patient not taking: Reported on 7/22/2025) 90 tablet 1     levothyroxine (SYNTHROID/LEVOTHROID) 112 MCG tablet Take 1 tablet (112 mcg) by mouth daily. 90 tablet 3     omeprazole (PRILOSEC) 40 MG DR capsule Take 1 capsule (40 mg) by mouth daily. 90 capsule 1     vitamin B complex with vitamin C (VITAMIN  B COMPLEX) tablet Take 1 tablet by mouth daily.         PAST SURGICAL HISTORY:  Past Surgical History:   Procedure Laterality Date     BIOPSY  1999    Breast biopsy     COLONOSCOPY  7/26/2011    Procedure:COMBINED COLONOSCOPY, REMOVE TUMOR/POLYP/LESION BY SNARE; single polyp removal; Surgeon:YUE LIMON; Location: GI     COLONOSCOPY Left 6/24/2016    Procedure: COMBINED COLONOSCOPY, SINGLE OR MULTIPLE BIOPSY/POLYPECTOMY BY BIOPSY;  Surgeon: Joseph Keyes MD;  Location: [facility] OR     COLONOSCOPY WITH CO2 INSUFFLATION N/A 6/24/2016    Procedure: COLONOSCOPY WITH CO2 INSUFFLATION;  Surgeon: Joseph Keyes MD;  Location: [facility] OR     ESOPHAGOSCOPY, GASTROSCOPY, DUODENOSCOPY (EGD), COMBINED N/A 5/8/2017    Procedure: COMBINED ESOPHAGOSCOPY, GASTROSCOPY, DUODENOSCOPY (EGD), BIOPSY SINGLE OR MULTIPLE;  ESOPHAGOSCOPY, GASTROSCOPY, DUODENOSCOPY (EGD) wiith multiple biopsies;  Surgeon: Chavez Land MD;  Location:  GI     EYE SURGERY  6/2/20    Cataract Surgery both eyes     GYN SURGERY  2005    Ablation of uterus.     HC HYSTEROSCOPY DIAGOSTIC (SEPARATE PROC)  8/10/2004    Hysteroscopy, D&C, Endometrial ablation     PELVIS LAPAROSCOPY,DX  1996    Pelvic pain - adhesions and mild endometriosis     US BREAST CLIP PLACEMENT W BIOPSY LEFT         ALLERGIES:     Allergies   Allergen Reactions      Amoxicillin      tightness in throat     Cephalosporins      ceftin     Clindamycin Base      Nausea, lightheadeness     Erythromycin      Welts     Macrolides And Ketolides      Sulfa Antibiotics Swelling     bactrim     Sulfamethoxazole      Trimethoprim        FAMILY HISTORY:  Family History   Problem Relation Age of Onset     Hyperlipidemia Mother      Neuropathy Mother      Thyroid Disease Father         Hashimotos     Hypertension Father      Sjogren's Paternal Grandmother      Thyroid Disease Son      Sjogren's Paternal Aunt          SOCIAL HISTORY:  Social History     Tobacco Use     Smoking status: Never     Smokeless tobacco: Never     Tobacco comments:     no smokers in household   Vaping Use     Vaping status: Never Used   Substance Use Topics     Alcohol use: No     Drug use: No       ROS:   Constitutional: No fever, chills, or sweats. Weight stable.   Cardiovascular: As per HPI.       Exam:  BP (!) 142/91 (BP Location: Right arm, Patient Position: Chair, Cuff Size: Adult Regular)   Pulse 80   Wt 87.1 kg (192 lb)   LMP 07/18/2005   SpO2 98%   BMI 28.35 kg/m    GENERAL APPEARANCE: alert and no distress  HEENT: no icterus, no central cyanosis  LYMPH/NECK: no adenopathy, no asymmetry, JVP not elevated.  RESPIRATORY: lungs clear to auscultation - no rales, rhonchi or wheezes, no use of accessory muscles, no retractions, respirations are unlabored, normal respiratory rate  CARDIOVASCULAR: regular rhythm, normal S1, S2, no S3 or S4 and no murmur, click or rub, precordium quiet with normal PMI.  GI: soft, non tender  EXTREMITIES: no edema  NEURO: alert, normal speech,and affect  SKIN: no ecchymoses, no rashes     I have reviewed the labs and personally reviewed the imaging below and made my comment in the assessment and plan.    Labs:  CBC RESULTS:   Lab Results   Component Value Date    WBC 4.5 06/01/2025    WBC 5.4 06/14/2018    RBC 4.89 06/01/2025    RBC 4.83 06/14/2018    HGB 14.3 06/01/2025    HGB  14.3 06/14/2018    HCT 41.7 06/01/2025    HCT 42.6 06/14/2018    MCV 85 06/01/2025    MCV 88 06/14/2018    MCH 29.2 06/01/2025    MCH 29.6 06/14/2018    MCHC 34.3 06/01/2025    MCHC 33.6 06/14/2018    RDW 12.4 06/01/2025    RDW 12.7 06/14/2018     06/01/2025     06/14/2018       BMP RESULTS:  Lab Results   Component Value Date     06/01/2025     05/15/2020    POTASSIUM 4.1 06/01/2025    POTASSIUM 4.3 01/03/2023    POTASSIUM 4.2 05/15/2020    CHLORIDE 99 06/01/2025    CHLORIDE 107 01/03/2023    CHLORIDE 104 05/15/2020    CO2 24 06/01/2025    CO2 30 01/03/2023    CO2 31 05/15/2020    ANIONGAP 13 06/01/2025    ANIONGAP 3 01/03/2023    ANIONGAP 4 05/15/2020     (H) 06/01/2025     (H) 01/03/2023    GLC 82 05/15/2020    BUN 15.9 06/01/2025    BUN 14 01/03/2023    BUN 12 05/15/2020    CR 0.90 06/01/2025    CR 0.88 05/15/2020    GFRESTIMATED 71 06/01/2025    GFRESTIMATED 71 05/15/2020    GFRESTBLACK 82 05/15/2020    MELANIE 9.9 06/01/2025    MELANIE 9.2 05/15/2020      Echocardiogram 2020:  The left ventricle is normal in size.  Left ventricular systolic function is normal.  The visual ejection fraction is estimated at 55-60%.  Normal left ventricular wall motion  The right ventricle is normal in structure, function and size.  No hemodynamically significant valvular abnormalities on 2D or color flow  imaging. There is no comparison study available.    CT chest 6/1/2025  No PE  Coronary calcification mild    Assessment and Plan:   Patient is being seen today for atypical chest discomfort since June of this year (since she started high dose anti-allergic medications for hives) which has not improved since.  Unlikely cardiac but cannot completely rule out.  Not exertional.    # Mild coronary calcifications  - Explained to patient what mild coronary calcifications meant.  Recommended statins given mildly elevated LDL but the patient has hives all over her body.  Will defer for now until hives  settle.    # Nonspecific EKG changes  # Chest discomfort lately since June of this year.  GI ruled out.  -Not exertional.  Given cardiovascular risk factors I recommended exercise stress echocardiogram.  If patient's symptoms do not resolve in few months despite normal stress test we will consider CT coronary angiogram.      # Mild hyperlipidemia  - Will discuss statins in 6 months when she comes back to recheck.    No medication changes today.  Return to clinic 6 months or earlier as needed.    Roque FRANKEL documentation tool used in the creation of this note.     Total time spent today for this visit is 62 minutes including precharting, face-to-face clinic visit, review of labs/imaging and medical documentation.    Jennie HOANG MD  [facility] Division of Cardiology    Securely message with [employer]     Please do not hesitate to contact me if you have any questions/concerns.     Sincerely,     Jennie Hoang MD

## 2025-07-28 NOTE — NURSING NOTE
"Chief Complaint   Patient presents with    Abnormal Ekg    New Patient       Initial BP (!) 150/55 (BP Location: Left arm, Patient Position: Chair, Cuff Size: Adult Regular)   Pulse 83   Wt 87.1 kg (192 lb)   LMP 07/18/2005   SpO2 98%   BMI 28.35 kg/m   Estimated body mass index is 28.35 kg/m  as calculated from the following:    Height as of 6/12/25: 1.753 m (5' 9\").    Weight as of this encounter: 87.1 kg (192 lb)..  BP completed using cuff size: regular    Annia Wei, Visit Facilitator    "

## 2025-08-26 ENCOUNTER — ALLIED HEALTH/NURSE VISIT (OUTPATIENT)
Dept: ALLERGY | Facility: OTHER | Age: 65
End: 2025-08-26
Payer: COMMERCIAL

## 2025-08-26 DIAGNOSIS — L50.1 CHRONIC IDIOPATHIC URTICARIA: Primary | ICD-10-CM

## 2025-08-26 PROCEDURE — 96372 THER/PROPH/DIAG INJ SC/IM: CPT | Performed by: ALLERGY & IMMUNOLOGY

## 2025-08-26 PROCEDURE — 99207 PR NO CHARGE LOS: CPT

## (undated) RX ORDER — FENTANYL CITRATE 50 UG/ML
INJECTION, SOLUTION INTRAMUSCULAR; INTRAVENOUS
Status: DISPENSED
Start: 2017-05-08